# Patient Record
Sex: MALE | Race: WHITE | Employment: FULL TIME | URBAN - METROPOLITAN AREA
[De-identification: names, ages, dates, MRNs, and addresses within clinical notes are randomized per-mention and may not be internally consistent; named-entity substitution may affect disease eponyms.]

---

## 2017-03-16 PROBLEM — Z86.711 HX PULMONARY EMBOLISM: Chronic | Status: ACTIVE | Noted: 2017-03-16

## 2017-03-16 PROBLEM — E66.01 MORBID OBESITY WITH BMI OF 50.0-59.9, ADULT (HCC): Chronic | Status: ACTIVE | Noted: 2017-03-16

## 2017-03-16 PROBLEM — R07.9 CHEST PAIN: Status: ACTIVE | Noted: 2017-03-16

## 2017-03-16 PROBLEM — I10 HTN (HYPERTENSION): Chronic | Status: ACTIVE | Noted: 2017-03-16

## 2017-03-27 ENCOUNTER — OFFICE VISIT (OUTPATIENT)
Dept: CARDIOLOGY CLINIC | Age: 62
End: 2017-03-27

## 2017-03-27 VITALS
DIASTOLIC BLOOD PRESSURE: 74 MMHG | BODY MASS INDEX: 46.65 KG/M2 | HEART RATE: 88 BPM | WEIGHT: 315 LBS | SYSTOLIC BLOOD PRESSURE: 126 MMHG | HEIGHT: 69 IN

## 2017-03-27 DIAGNOSIS — I25.10 CORONARY ARTERY DISEASE INVOLVING NATIVE CORONARY ARTERY OF NATIVE HEART WITHOUT ANGINA PECTORIS: ICD-10-CM

## 2017-03-27 DIAGNOSIS — E66.01 MORBID OBESITY WITH BMI OF 50.0-59.9, ADULT (HCC): ICD-10-CM

## 2017-03-27 DIAGNOSIS — R07.9 CHEST PAIN, UNSPECIFIED TYPE: Primary | ICD-10-CM

## 2017-03-27 DIAGNOSIS — I10 ESSENTIAL HYPERTENSION: ICD-10-CM

## 2017-03-27 PROCEDURE — 99214 OFFICE O/P EST MOD 30 MIN: CPT | Performed by: NURSE PRACTITIONER

## 2017-03-27 RX ORDER — ISOSORBIDE MONONITRATE 30 MG/1
30 TABLET, EXTENDED RELEASE ORAL DAILY
Qty: 30 TABLET | Refills: 5 | Status: SHIPPED | OUTPATIENT
Start: 2017-03-27 | End: 2017-10-12 | Stop reason: SDUPTHER

## 2017-03-27 RX ORDER — ATORVASTATIN CALCIUM 10 MG/1
10 TABLET, FILM COATED ORAL NIGHTLY
Qty: 30 TABLET | Refills: 5 | Status: SHIPPED | OUTPATIENT
Start: 2017-03-27 | End: 2017-11-10 | Stop reason: SDUPTHER

## 2017-06-30 ENCOUNTER — OFFICE VISIT (OUTPATIENT)
Dept: CARDIOLOGY CLINIC | Age: 62
End: 2017-06-30

## 2017-06-30 VITALS
OXYGEN SATURATION: 97 % | HEIGHT: 69 IN | BODY MASS INDEX: 46.65 KG/M2 | WEIGHT: 315 LBS | DIASTOLIC BLOOD PRESSURE: 72 MMHG | SYSTOLIC BLOOD PRESSURE: 128 MMHG | HEART RATE: 82 BPM

## 2017-06-30 DIAGNOSIS — I10 ESSENTIAL HYPERTENSION: Primary | Chronic | ICD-10-CM

## 2017-06-30 PROCEDURE — 99214 OFFICE O/P EST MOD 30 MIN: CPT | Performed by: INTERNAL MEDICINE

## 2017-08-28 ENCOUNTER — OFFICE VISIT (OUTPATIENT)
Dept: CARDIOLOGY CLINIC | Age: 62
End: 2017-08-28

## 2017-08-28 VITALS
WEIGHT: 315 LBS | HEART RATE: 83 BPM | OXYGEN SATURATION: 96 % | SYSTOLIC BLOOD PRESSURE: 114 MMHG | HEIGHT: 69 IN | DIASTOLIC BLOOD PRESSURE: 60 MMHG | BODY MASS INDEX: 46.65 KG/M2

## 2017-08-28 DIAGNOSIS — R06.02 SOB (SHORTNESS OF BREATH): Primary | ICD-10-CM

## 2017-08-28 DIAGNOSIS — I25.10 CORONARY ARTERY DISEASE INVOLVING NATIVE CORONARY ARTERY OF NATIVE HEART WITHOUT ANGINA PECTORIS: ICD-10-CM

## 2017-08-28 DIAGNOSIS — I10 ESSENTIAL HYPERTENSION: ICD-10-CM

## 2017-08-28 LAB
BASOPHILS ABSOLUTE: 0 %
BASOPHILS ABSOLUTE: 0.03 THOU/MCL (ref 0.01–0.07)
EOSINOPHILS ABSOLUTE: 0.22 THOU/MCL (ref 0.03–0.45)
EOSINOPHILS RELATIVE PERCENT: 3 %
HCT VFR BLD CALC: 29 % (ref 40–50)
HEMOGLOBIN: 9.6 G/DL (ref 13.5–16.5)
LYMPHOCYTES ABSOLUTE: 2.05 THOU/MCL (ref 1–4)
LYMPHOCYTES RELATIVE PERCENT: 27 %
MCH RBC QN AUTO: 27 PG (ref 27–33)
MCHC RBC AUTO-ENTMCNC: 33 G/DL (ref 32–36)
MCV RBC AUTO: 81 FL (ref 82–97)
MONOCYTES # BLD: 8 %
MONOCYTES ABSOLUTE: 0.6 THOU/MCL (ref 0.2–0.9)
NEUTROPHILS ABSOLUTE: 4.82 THOU/MCL (ref 1.8–7.7)
PDW BLD-RTO: 16.1 %
PLATELET # BLD: 331 THOU/MCL (ref 140–375)
RBC # BLD: 3.61 MIL/MCL (ref 4.4–5.8)
SEG NEUTROPHILS: 62 %
WBC # BLD: 7.7 THOU/MCL (ref 3.6–10.5)

## 2017-08-28 PROCEDURE — 99214 OFFICE O/P EST MOD 30 MIN: CPT | Performed by: NURSE PRACTITIONER

## 2017-08-28 RX ORDER — AMOXICILLIN 500 MG/1
500 CAPSULE ORAL 2 TIMES DAILY
COMMUNITY
End: 2017-08-28 | Stop reason: CLARIF

## 2017-08-28 RX ORDER — AMOXICILLIN AND CLAVULANATE POTASSIUM 875; 125 MG/1; MG/1
1 TABLET, FILM COATED ORAL 2 TIMES DAILY
COMMUNITY
End: 2019-12-23 | Stop reason: ALTCHOICE

## 2017-08-29 ENCOUNTER — TELEPHONE (OUTPATIENT)
Dept: CARDIOLOGY CLINIC | Age: 62
End: 2017-08-29

## 2017-10-13 RX ORDER — ISOSORBIDE MONONITRATE 30 MG/1
30 TABLET, EXTENDED RELEASE ORAL DAILY
Qty: 30 TABLET | Refills: 11 | Status: SHIPPED | OUTPATIENT
Start: 2017-10-13 | End: 2018-10-16 | Stop reason: SDUPTHER

## 2017-11-10 RX ORDER — ATORVASTATIN CALCIUM 10 MG/1
TABLET, FILM COATED ORAL
Qty: 30 TABLET | Refills: 0 | Status: SHIPPED | OUTPATIENT
Start: 2017-11-10 | End: 2017-12-11 | Stop reason: SDUPTHER

## 2017-12-11 RX ORDER — ATORVASTATIN CALCIUM 10 MG/1
TABLET, FILM COATED ORAL
Qty: 30 TABLET | Refills: 5 | Status: SHIPPED | OUTPATIENT
Start: 2017-12-11 | End: 2018-06-19 | Stop reason: SDUPTHER

## 2018-06-19 RX ORDER — ATORVASTATIN CALCIUM 10 MG/1
TABLET, FILM COATED ORAL
Qty: 30 TABLET | Refills: 0 | Status: SHIPPED | OUTPATIENT
Start: 2018-06-19 | End: 2018-08-16 | Stop reason: SDUPTHER

## 2018-08-17 RX ORDER — ATORVASTATIN CALCIUM 10 MG/1
TABLET, FILM COATED ORAL
Qty: 30 TABLET | Refills: 0 | Status: SHIPPED | OUTPATIENT
Start: 2018-08-17

## 2018-08-19 ENCOUNTER — APPOINTMENT (OUTPATIENT)
Dept: GENERAL RADIOLOGY | Age: 63
End: 2018-08-19
Payer: COMMERCIAL

## 2018-08-19 ENCOUNTER — HOSPITAL ENCOUNTER (EMERGENCY)
Age: 63
Discharge: HOME | End: 2018-08-19
Attending: EMERGENCY MEDICINE
Payer: COMMERCIAL

## 2018-08-19 VITALS
HEIGHT: 69 IN | RESPIRATION RATE: 15 BRPM | DIASTOLIC BLOOD PRESSURE: 75 MMHG | TEMPERATURE: 98 F | OXYGEN SATURATION: 96 % | HEART RATE: 83 BPM | WEIGHT: 315 LBS | SYSTOLIC BLOOD PRESSURE: 130 MMHG | BODY MASS INDEX: 46.65 KG/M2

## 2018-08-19 DIAGNOSIS — I83.013 VENOUS STASIS ULCER OF RIGHT ANKLE, UNSPECIFIED ULCER STAGE, UNSPECIFIED WHETHER VARICOSE VEINS PRESENT (HCC): ICD-10-CM

## 2018-08-19 DIAGNOSIS — I87.8 CHRONIC VENOUS STASIS: Primary | ICD-10-CM

## 2018-08-19 DIAGNOSIS — L97.319 VENOUS STASIS ULCER OF RIGHT ANKLE, UNSPECIFIED ULCER STAGE, UNSPECIFIED WHETHER VARICOSE VEINS PRESENT (HCC): ICD-10-CM

## 2018-08-19 LAB
A/G RATIO: 1.2 (ref 1.1–2.2)
ALBUMIN SERPL-MCNC: 3.7 G/DL (ref 3.4–5)
ALP BLD-CCNC: 126 U/L (ref 40–129)
ALT SERPL-CCNC: 14 U/L (ref 10–40)
ANION GAP SERPL CALCULATED.3IONS-SCNC: 11 MMOL/L (ref 3–16)
AST SERPL-CCNC: 19 U/L (ref 15–37)
BASOPHILS ABSOLUTE: 0.1 K/UL (ref 0–0.2)
BASOPHILS RELATIVE PERCENT: 0.8 %
BILIRUB SERPL-MCNC: 0.4 MG/DL (ref 0–1)
BUN BLDV-MCNC: 19 MG/DL (ref 7–20)
CALCIUM SERPL-MCNC: 8.4 MG/DL (ref 8.3–10.6)
CHLORIDE BLD-SCNC: 105 MMOL/L (ref 99–110)
CO2: 25 MMOL/L (ref 21–32)
CREAT SERPL-MCNC: 0.9 MG/DL (ref 0.8–1.3)
EOSINOPHILS ABSOLUTE: 0.3 K/UL (ref 0–0.6)
EOSINOPHILS RELATIVE PERCENT: 3.2 %
GFR AFRICAN AMERICAN: >60
GFR NON-AFRICAN AMERICAN: >60
GLOBULIN: 3 G/DL
GLUCOSE BLD-MCNC: 84 MG/DL (ref 70–99)
HCT VFR BLD CALC: 32.4 % (ref 40.5–52.5)
HEMOGLOBIN: 10.7 G/DL (ref 13.5–17.5)
INR BLD: 2.08 (ref 0.86–1.14)
LYMPHOCYTES ABSOLUTE: 1.7 K/UL (ref 1–5.1)
LYMPHOCYTES RELATIVE PERCENT: 18.9 %
MCH RBC QN AUTO: 24.1 PG (ref 26–34)
MCHC RBC AUTO-ENTMCNC: 33 G/DL (ref 31–36)
MCV RBC AUTO: 72.8 FL (ref 80–100)
MONOCYTES ABSOLUTE: 0.8 K/UL (ref 0–1.3)
MONOCYTES RELATIVE PERCENT: 9.1 %
NEUTROPHILS ABSOLUTE: 5.9 K/UL (ref 1.7–7.7)
NEUTROPHILS RELATIVE PERCENT: 68 %
PDW BLD-RTO: 17.8 % (ref 12.4–15.4)
PLATELET # BLD: 220 K/UL (ref 135–450)
PMV BLD AUTO: 7 FL (ref 5–10.5)
POTASSIUM SERPL-SCNC: 4.5 MMOL/L (ref 3.5–5.1)
PRO-BNP: 126 PG/ML (ref 0–124)
PROTHROMBIN TIME: 23.7 SEC (ref 9.8–13)
RBC # BLD: 4.45 M/UL (ref 4.2–5.9)
SODIUM BLD-SCNC: 141 MMOL/L (ref 136–145)
TOTAL PROTEIN: 6.7 G/DL (ref 6.4–8.2)
TROPONIN: <0.01 NG/ML
WBC # BLD: 8.7 K/UL (ref 4–11)

## 2018-08-19 PROCEDURE — 87070 CULTURE OTHR SPECIMN AEROBIC: CPT

## 2018-08-19 PROCEDURE — 87077 CULTURE AEROBIC IDENTIFY: CPT

## 2018-08-19 PROCEDURE — 87186 SC STD MICRODIL/AGAR DIL: CPT

## 2018-08-19 PROCEDURE — 83880 ASSAY OF NATRIURETIC PEPTIDE: CPT

## 2018-08-19 PROCEDURE — 87205 SMEAR GRAM STAIN: CPT

## 2018-08-19 PROCEDURE — 85610 PROTHROMBIN TIME: CPT

## 2018-08-19 PROCEDURE — 84484 ASSAY OF TROPONIN QUANT: CPT

## 2018-08-19 PROCEDURE — 99284 EMERGENCY DEPT VISIT MOD MDM: CPT

## 2018-08-19 PROCEDURE — 80053 COMPREHEN METABOLIC PANEL: CPT

## 2018-08-19 PROCEDURE — 71045 X-RAY EXAM CHEST 1 VIEW: CPT

## 2018-08-19 PROCEDURE — 85025 COMPLETE CBC W/AUTO DIFF WBC: CPT

## 2018-08-19 RX ORDER — ALPRAZOLAM 0.5 MG/1
0.5 TABLET ORAL
COMMUNITY

## 2018-08-23 LAB
GRAM STAIN RESULT: ABNORMAL
ORGANISM: ABNORMAL
WOUND/ABSCESS: ABNORMAL
WOUND/ABSCESS: ABNORMAL

## 2018-10-17 RX ORDER — ISOSORBIDE MONONITRATE 30 MG/1
TABLET, EXTENDED RELEASE ORAL
Qty: 30 TABLET | Refills: 0 | Status: SHIPPED | OUTPATIENT
Start: 2018-10-17 | End: 2018-11-27 | Stop reason: SDUPTHER

## 2018-11-27 RX ORDER — ISOSORBIDE MONONITRATE 30 MG/1
TABLET, EXTENDED RELEASE ORAL
Qty: 10 TABLET | Refills: 0 | Status: SHIPPED | OUTPATIENT
Start: 2018-11-27 | End: 2018-12-07 | Stop reason: SDUPTHER

## 2018-12-07 RX ORDER — ISOSORBIDE MONONITRATE 30 MG/1
TABLET, EXTENDED RELEASE ORAL
Qty: 10 TABLET | Refills: 0 | Status: SHIPPED | OUTPATIENT
Start: 2018-12-07 | End: 2018-12-27 | Stop reason: SDUPTHER

## 2019-01-03 ENCOUNTER — OFFICE VISIT (OUTPATIENT)
Dept: CARDIOLOGY CLINIC | Age: 64
End: 2019-01-03
Payer: COMMERCIAL

## 2019-01-03 VITALS
HEART RATE: 77 BPM | OXYGEN SATURATION: 98 % | DIASTOLIC BLOOD PRESSURE: 84 MMHG | HEIGHT: 70 IN | SYSTOLIC BLOOD PRESSURE: 138 MMHG | BODY MASS INDEX: 45.1 KG/M2 | WEIGHT: 315 LBS

## 2019-01-03 DIAGNOSIS — I10 ESSENTIAL HYPERTENSION: Chronic | ICD-10-CM

## 2019-01-03 DIAGNOSIS — E66.01 MORBID OBESITY WITH BMI OF 50.0-59.9, ADULT (HCC): Primary | Chronic | ICD-10-CM

## 2019-01-03 DIAGNOSIS — I25.10 CORONARY ARTERY DISEASE INVOLVING NATIVE CORONARY ARTERY OF NATIVE HEART WITHOUT ANGINA PECTORIS: ICD-10-CM

## 2019-01-03 PROCEDURE — 99214 OFFICE O/P EST MOD 30 MIN: CPT | Performed by: INTERNAL MEDICINE

## 2019-01-14 RX ORDER — ISOSORBIDE MONONITRATE 30 MG/1
30 TABLET, EXTENDED RELEASE ORAL DAILY
Qty: 90 TABLET | Refills: 3 | Status: SHIPPED | OUTPATIENT
Start: 2019-01-14 | End: 2019-12-06 | Stop reason: SDUPTHER

## 2019-12-06 RX ORDER — ISOSORBIDE MONONITRATE 30 MG/1
30 TABLET, EXTENDED RELEASE ORAL DAILY
Qty: 90 TABLET | Refills: 0 | Status: SHIPPED | OUTPATIENT
Start: 2019-12-06 | End: 2020-03-13 | Stop reason: SDUPTHER

## 2019-12-23 ENCOUNTER — HOSPITAL ENCOUNTER (EMERGENCY)
Age: 64
Discharge: HOME | End: 2019-12-23
Attending: EMERGENCY MEDICINE
Payer: COMMERCIAL

## 2019-12-23 VITALS
WEIGHT: 315 LBS | SYSTOLIC BLOOD PRESSURE: 159 MMHG | HEIGHT: 69 IN | HEART RATE: 75 BPM | DIASTOLIC BLOOD PRESSURE: 94 MMHG | RESPIRATION RATE: 20 BRPM | OXYGEN SATURATION: 97 % | TEMPERATURE: 97.4 F | BODY MASS INDEX: 46.65 KG/M2

## 2019-12-23 DIAGNOSIS — R04.0 EPISTAXIS: Primary | ICD-10-CM

## 2019-12-23 LAB
BASOPHILS ABSOLUTE: 0 K/UL (ref 0–0.2)
BASOPHILS RELATIVE PERCENT: 0.6 %
EOSINOPHILS ABSOLUTE: 0.2 K/UL (ref 0–0.6)
EOSINOPHILS RELATIVE PERCENT: 3.7 %
HCT VFR BLD CALC: 41.9 % (ref 40.5–52.5)
HEMOGLOBIN: 14.1 G/DL (ref 13.5–17.5)
INR BLD: 2.27 (ref 0.86–1.14)
LYMPHOCYTES ABSOLUTE: 1.2 K/UL (ref 1–5.1)
LYMPHOCYTES RELATIVE PERCENT: 18.1 %
MCH RBC QN AUTO: 29.8 PG (ref 26–34)
MCHC RBC AUTO-ENTMCNC: 33.7 G/DL (ref 31–36)
MCV RBC AUTO: 88.3 FL (ref 80–100)
MONOCYTES ABSOLUTE: 0.6 K/UL (ref 0–1.3)
MONOCYTES RELATIVE PERCENT: 8.5 %
NEUTROPHILS ABSOLUTE: 4.6 K/UL (ref 1.7–7.7)
NEUTROPHILS RELATIVE PERCENT: 69.1 %
PDW BLD-RTO: 13.5 % (ref 12.4–15.4)
PLATELET # BLD: 180 K/UL (ref 135–450)
PMV BLD AUTO: 6.6 FL (ref 5–10.5)
PROTHROMBIN TIME: 26.6 SEC (ref 10–13.2)
RBC # BLD: 4.75 M/UL (ref 4.2–5.9)
WBC # BLD: 6.7 K/UL (ref 4–11)

## 2019-12-23 PROCEDURE — 99283 EMERGENCY DEPT VISIT LOW MDM: CPT

## 2019-12-23 PROCEDURE — 30901 CONTROL OF NOSEBLEED: CPT

## 2019-12-23 PROCEDURE — 6370000000 HC RX 637 (ALT 250 FOR IP)

## 2019-12-23 PROCEDURE — 85025 COMPLETE CBC W/AUTO DIFF WBC: CPT

## 2019-12-23 PROCEDURE — 85610 PROTHROMBIN TIME: CPT

## 2019-12-23 RX ORDER — OXYMETAZOLINE HYDROCHLORIDE 0.05 G/100ML
SPRAY NASAL
Status: COMPLETED
Start: 2019-12-23 | End: 2019-12-23

## 2019-12-23 RX ADMIN — OXYMETAZOLINE HCL: 0.05 SPRAY NASAL at 07:20

## 2019-12-23 ASSESSMENT — ENCOUNTER SYMPTOMS
BACK PAIN: 0
SHORTNESS OF BREATH: 0
SORE THROAT: 0
COUGH: 0
CONSTIPATION: 0
NAUSEA: 0
DIARRHEA: 0
VOMITING: 0
ABDOMINAL PAIN: 0

## 2020-03-06 ENCOUNTER — TELEPHONE (OUTPATIENT)
Dept: CARDIOLOGY CLINIC | Age: 65
End: 2020-03-06

## 2020-03-13 RX ORDER — ISOSORBIDE MONONITRATE 30 MG/1
30 TABLET, EXTENDED RELEASE ORAL DAILY
Qty: 90 TABLET | Refills: 0 | Status: SHIPPED | OUTPATIENT
Start: 2020-03-13 | End: 2020-06-01

## 2020-06-01 RX ORDER — ISOSORBIDE MONONITRATE 30 MG/1
TABLET, EXTENDED RELEASE ORAL
Qty: 90 TABLET | Refills: 1 | Status: SHIPPED | OUTPATIENT
Start: 2020-06-01 | End: 2020-11-30 | Stop reason: SDUPTHER

## 2020-06-15 ENCOUNTER — HOSPITAL ENCOUNTER (EMERGENCY)
Age: 65
Discharge: HOME | End: 2020-06-16
Attending: EMERGENCY MEDICINE
Payer: COMMERCIAL

## 2020-06-15 ENCOUNTER — APPOINTMENT (OUTPATIENT)
Dept: GENERAL RADIOLOGY | Age: 65
End: 2020-06-15
Payer: COMMERCIAL

## 2020-06-15 LAB
A/G RATIO: 1 (ref 1.1–2.2)
ALBUMIN SERPL-MCNC: 3.8 G/DL (ref 3.4–5)
ALP BLD-CCNC: 105 U/L (ref 40–129)
ALT SERPL-CCNC: 19 U/L (ref 10–40)
ANION GAP SERPL CALCULATED.3IONS-SCNC: 7 MMOL/L (ref 3–16)
AST SERPL-CCNC: 19 U/L (ref 15–37)
BASOPHILS ABSOLUTE: 0 K/UL (ref 0–0.2)
BASOPHILS RELATIVE PERCENT: 0.2 %
BILIRUB SERPL-MCNC: 1.1 MG/DL (ref 0–1)
BUN BLDV-MCNC: 14 MG/DL (ref 7–20)
CALCIUM SERPL-MCNC: 9.1 MG/DL (ref 8.3–10.6)
CHLORIDE BLD-SCNC: 97 MMOL/L (ref 99–110)
CO2: 29 MMOL/L (ref 21–32)
CREAT SERPL-MCNC: 0.9 MG/DL (ref 0.8–1.3)
EOSINOPHILS ABSOLUTE: 0.1 K/UL (ref 0–0.6)
EOSINOPHILS RELATIVE PERCENT: 0.6 %
GFR AFRICAN AMERICAN: >60
GFR NON-AFRICAN AMERICAN: >60
GLOBULIN: 3.8 G/DL
GLUCOSE BLD-MCNC: 119 MG/DL (ref 70–99)
HCT VFR BLD CALC: 41 % (ref 40.5–52.5)
HEMOGLOBIN: 13.6 G/DL (ref 13.5–17.5)
LACTIC ACID: 1 MMOL/L (ref 0.4–2)
LYMPHOCYTES ABSOLUTE: 1 K/UL (ref 1–5.1)
LYMPHOCYTES RELATIVE PERCENT: 5.5 %
MCH RBC QN AUTO: 27.6 PG (ref 26–34)
MCHC RBC AUTO-ENTMCNC: 33.2 G/DL (ref 31–36)
MCV RBC AUTO: 83.2 FL (ref 80–100)
MONOCYTES ABSOLUTE: 1 K/UL (ref 0–1.3)
MONOCYTES RELATIVE PERCENT: 5.7 %
NEUTROPHILS ABSOLUTE: 15.2 K/UL (ref 1.7–7.7)
NEUTROPHILS RELATIVE PERCENT: 88 %
PDW BLD-RTO: 13.8 % (ref 12.4–15.4)
PLATELET # BLD: 238 K/UL (ref 135–450)
PMV BLD AUTO: 6.8 FL (ref 5–10.5)
POTASSIUM SERPL-SCNC: 4.8 MMOL/L (ref 3.5–5.1)
PROCALCITONIN: 0.16 NG/ML (ref 0–0.15)
RBC # BLD: 4.93 M/UL (ref 4.2–5.9)
SODIUM BLD-SCNC: 133 MMOL/L (ref 136–145)
TOTAL PROTEIN: 7.6 G/DL (ref 6.4–8.2)
WBC # BLD: 17.3 K/UL (ref 4–11)

## 2020-06-15 PROCEDURE — 85025 COMPLETE CBC W/AUTO DIFF WBC: CPT

## 2020-06-15 PROCEDURE — 84145 PROCALCITONIN (PCT): CPT

## 2020-06-15 PROCEDURE — 80053 COMPREHEN METABOLIC PANEL: CPT

## 2020-06-15 PROCEDURE — 2580000003 HC RX 258: Performed by: EMERGENCY MEDICINE

## 2020-06-15 PROCEDURE — 83605 ASSAY OF LACTIC ACID: CPT

## 2020-06-15 PROCEDURE — 99283 EMERGENCY DEPT VISIT LOW MDM: CPT

## 2020-06-15 PROCEDURE — 71045 X-RAY EXAM CHEST 1 VIEW: CPT

## 2020-06-15 RX ORDER — 0.9 % SODIUM CHLORIDE 0.9 %
500 INTRAVENOUS SOLUTION INTRAVENOUS ONCE
Status: COMPLETED | OUTPATIENT
Start: 2020-06-15 | End: 2020-06-16

## 2020-06-15 RX ADMIN — SODIUM CHLORIDE 500 ML: 9 INJECTION, SOLUTION INTRAVENOUS at 23:04

## 2020-06-16 VITALS
WEIGHT: 315 LBS | SYSTOLIC BLOOD PRESSURE: 164 MMHG | DIASTOLIC BLOOD PRESSURE: 73 MMHG | OXYGEN SATURATION: 96 % | TEMPERATURE: 99.8 F | RESPIRATION RATE: 20 BRPM | BODY MASS INDEX: 46.65 KG/M2 | HEART RATE: 108 BPM | HEIGHT: 69 IN

## 2020-06-16 LAB
BACTERIA: ABNORMAL /HPF
BILIRUBIN URINE: NEGATIVE
BLOOD, URINE: ABNORMAL
CLARITY: CLEAR
COLOR: YELLOW
EPITHELIAL CELLS, UA: ABNORMAL /HPF (ref 0–5)
GLUCOSE URINE: NEGATIVE MG/DL
KETONES, URINE: NEGATIVE MG/DL
LEUKOCYTE ESTERASE, URINE: NEGATIVE
MICROSCOPIC EXAMINATION: YES
MUCUS: ABNORMAL /LPF
NITRITE, URINE: NEGATIVE
PH UA: 7.5 (ref 5–8)
PROTEIN UA: NEGATIVE MG/DL
RBC UA: ABNORMAL /HPF (ref 0–4)
SPECIFIC GRAVITY UA: 1.01 (ref 1–1.03)
URINE TYPE: ABNORMAL
UROBILINOGEN, URINE: 4 E.U./DL
WBC UA: ABNORMAL /HPF (ref 0–5)

## 2020-06-16 PROCEDURE — 2580000003 HC RX 258: Performed by: EMERGENCY MEDICINE

## 2020-06-16 PROCEDURE — 6370000000 HC RX 637 (ALT 250 FOR IP): Performed by: EMERGENCY MEDICINE

## 2020-06-16 PROCEDURE — 81001 URINALYSIS AUTO W/SCOPE: CPT

## 2020-06-16 RX ORDER — PREDNISONE 10 MG/1
20 TABLET ORAL DAILY
Qty: 6 TABLET | Refills: 0 | Status: SHIPPED | OUTPATIENT
Start: 2020-06-16 | End: 2020-06-19

## 2020-06-16 RX ORDER — DOXYCYCLINE HYCLATE 100 MG
100 TABLET ORAL 2 TIMES DAILY
Qty: 20 TABLET | Refills: 0 | Status: SHIPPED | OUTPATIENT
Start: 2020-06-16 | End: 2020-06-26

## 2020-06-16 RX ORDER — DOXYCYCLINE HYCLATE 100 MG
100 TABLET ORAL ONCE
Status: COMPLETED | OUTPATIENT
Start: 2020-06-16 | End: 2020-06-16

## 2020-06-16 RX ORDER — ALBUTEROL SULFATE 90 UG/1
2 AEROSOL, METERED RESPIRATORY (INHALATION) EVERY 4 HOURS PRN
Qty: 1 INHALER | Refills: 0 | Status: SHIPPED | OUTPATIENT
Start: 2020-06-16 | End: 2021-06-16

## 2020-06-16 RX ORDER — PREDNISONE 20 MG/1
40 TABLET ORAL ONCE
Status: COMPLETED | OUTPATIENT
Start: 2020-06-16 | End: 2020-06-16

## 2020-06-16 RX ORDER — 0.9 % SODIUM CHLORIDE 0.9 %
500 INTRAVENOUS SOLUTION INTRAVENOUS ONCE
Status: COMPLETED | OUTPATIENT
Start: 2020-06-16 | End: 2020-06-16

## 2020-06-16 RX ORDER — IBUPROFEN 800 MG/1
800 TABLET ORAL ONCE
Status: COMPLETED | OUTPATIENT
Start: 2020-06-16 | End: 2020-06-16

## 2020-06-16 RX ADMIN — SODIUM CHLORIDE 500 ML: 9 INJECTION, SOLUTION INTRAVENOUS at 00:34

## 2020-06-16 RX ADMIN — PREDNISONE 40 MG: 20 TABLET ORAL at 01:05

## 2020-06-16 RX ADMIN — IBUPROFEN 800 MG: 800 TABLET ORAL at 01:05

## 2020-06-16 RX ADMIN — DOXYCYCLINE HYCLATE 100 MG: 100 TABLET, COATED ORAL at 01:05

## 2020-06-16 ASSESSMENT — PAIN SCALES - WONG BAKER: WONGBAKER_NUMERICALRESPONSE: 2

## 2020-06-16 ASSESSMENT — PAIN SCALES - GENERAL: PAINLEVEL_OUTOF10: 3

## 2020-06-16 NOTE — ED PROVIDER NOTES
CHIEF COMPLAINT  Fever (on and off for around a week, states was 103 at home prior to arrival - 99.9 during triage, has hx UTI's)      HISTORY OF PRESENT ILLNESS  Gee Linares is a 75 y.o. male who presents to the ED with *fever on and off for about a week subjectively states his fever got up to 103 at home today he did take Tylenol. Spoke with his doctor who told him to come to the ER. He does have a history of frequent UTIs that he says is been urinating about every 20 minutes. No cough or shortness of breath or any night sweats or any dizziness or headache or any nausea or vomiting or abdominal pain or chest pain. He denies any numbness or tingling. No bowel issues. No other complaints, modifying factors or associated symptoms. I have reviewed the following from the nursing documentation. Past Medical History:   Diagnosis Date    H/O gastric bypass 07/2014    Hypertension     Obesity     Panic attacks     PE (pulmonary embolism) 2010     Past Surgical History:   Procedure Laterality Date    CHOLECYSTECTOMY  2009    GASTRIC BYPASS SURGERY  2014    HERNIA REPAIR  03/91/8452    umbilical hernia     History reviewed. No pertinent family history.   Social History     Socioeconomic History    Marital status:      Spouse name: Not on file    Number of children: Not on file    Years of education: Not on file    Highest education level: Not on file   Occupational History    Not on file   Social Needs    Financial resource strain: Not on file    Food insecurity     Worry: Not on file     Inability: Not on file    Transportation needs     Medical: Not on file     Non-medical: Not on file   Tobacco Use    Smoking status: Never Smoker    Smokeless tobacco: Never Used   Substance and Sexual Activity    Alcohol use: Yes     Comment: occ    Drug use: No    Sexual activity: Yes     Partners: Female   Lifestyle    Physical activity     Days per week: Not on file     Minutes per session: Not on file    Stress: Not on file   Relationships    Social connections     Talks on phone: Not on file     Gets together: Not on file     Attends Rastafarian service: Not on file     Active member of club or organization: Not on file     Attends meetings of clubs or organizations: Not on file     Relationship status: Not on file    Intimate partner violence     Fear of current or ex partner: Not on file     Emotionally abused: Not on file     Physically abused: Not on file     Forced sexual activity: Not on file   Other Topics Concern    Not on file   Social History Narrative    Not on file     No current facility-administered medications for this encounter. Current Outpatient Medications   Medication Sig Dispense Refill    doxycycline hyclate (VIBRA-TABS) 100 MG tablet Take 1 tablet by mouth 2 times daily for 10 days 20 tablet 0    predniSONE (DELTASONE) 10 MG tablet Take 2 tablets by mouth daily for 3 days 6 tablet 0    albuterol sulfate HFA (PROVENTIL HFA) 108 (90 Base) MCG/ACT inhaler Inhale 2 puffs into the lungs every 4 hours as needed for Wheezing 1 Inhaler 0    isosorbide mononitrate (IMDUR) 30 MG extended release tablet TAKE 1 TABLET BY MOUTH EVERY DAY 90 tablet 1    ALPRAZolam (XANAX) 0.5 MG tablet Take 0.5 mg by mouth. Allena Charlene atorvastatin (LIPITOR) 10 MG tablet TAKE 1 TABLET BY MOUTH EVERY EVENING 30 tablet 0    Multiple Vitamins-Minerals (CENTRUM SILVER PO) Take 1 tablet by mouth daily      Calcium Citrate (CITRACAL PO) Take 1 tablet by mouth daily      nitroGLYCERIN (NITROSTAT) 0.4 MG SL tablet up to max of 3 total doses. If no relief after 1 dose, call 911. 25 tablet 0    warfarin (COUMADIN) 5 MG tablet 10 mg on Sun Tuesday Thursday and Saturday. 7.5 mg Monday Wednesday and Friday (Patient taking differently: Take 10 mg by mouth daily ) 30 tablet 3    aspirin 81 MG tablet Take 81 mg by mouth daily.        No Known Allergies    REVIEW OF SYSTEMS  10 systems reviewed, Sodium 133 (L) 136 - 145 mmol/L    Potassium 4.8 3.5 - 5.1 mmol/L    Chloride 97 (L) 99 - 110 mmol/L    CO2 29 21 - 32 mmol/L    Anion Gap 7 3 - 16    Glucose 119 (H) 70 - 99 mg/dL    BUN 14 7 - 20 mg/dL    CREATININE 0.9 0.8 - 1.3 mg/dL    GFR Non-African American >60 >60    GFR African American >60 >60    Calcium 9.1 8.3 - 10.6 mg/dL    Total Protein 7.6 6.4 - 8.2 g/dL    Alb 3.8 3.4 - 5.0 g/dL    Albumin/Globulin Ratio 1.0 (L) 1.1 - 2.2    Total Bilirubin 1.1 (H) 0.0 - 1.0 mg/dL    Alkaline Phosphatase 105 40 - 129 U/L    ALT 19 10 - 40 U/L    AST 19 15 - 37 U/L    Globulin 3.8 g/dL   Urinalysis   Result Value Ref Range    Color, UA Yellow Straw/Yellow    Clarity, UA Clear Clear    Glucose, Ur Negative Negative mg/dL    Bilirubin Urine Negative Negative    Ketones, Urine Negative Negative mg/dL    Specific Gravity, UA 1.015 1.005 - 1.030    Blood, Urine SMALL (A) Negative    pH, UA 7.5 5.0 - 8.0    Protein, UA Negative Negative mg/dL    Urobilinogen, Urine 4.0 (A) <2.0 E.U./dL    Nitrite, Urine Negative Negative    Leukocyte Esterase, Urine Negative Negative    Microscopic Examination YES     Urine Type NotGiven    Lactic Acid, Plasma   Result Value Ref Range    Lactic Acid 1.0 0.4 - 2.0 mmol/L   Procalcitonin   Result Value Ref Range    Procalcitonin 0.16 (H) 0.00 - 0.15 ng/mL   Microscopic Urinalysis   Result Value Ref Range    Mucus, UA Rare (A) None Seen /LPF    WBC, UA 3-5 0 - 5 /HPF    RBC, UA 11-20 (A) 0 - 4 /HPF    Epithelial Cells, UA 0-1 0 - 5 /HPF    Bacteria, UA Rare (A) None Seen /HPF       RADIOLOGY  X-RAYS:  I have reviewed radiologic plain film image(s). ALL OTHER NON-PLAIN FILM IMAGES SUCH AS CT, ULTRASOUND AND MRI HAVE BEEN READ BY THE RADIOLOGIST. XR CHEST PORTABLE   Final Result   Question right basilar airspace disease, atelectasis and/or pneumonia. ED COURSE/MDM  Patient seen and evaluated. I estimate there is LOW risk for (including but not limited to) ACUTE CORONARY DO  06/16/20 7121

## 2020-06-17 ENCOUNTER — TELEPHONE (OUTPATIENT)
Dept: OTHER | Facility: CLINIC | Age: 65
End: 2020-06-17

## 2020-06-17 NOTE — TELEPHONE ENCOUNTER
Patient contacted regarding recent visit for viral symptoms. This Lor Woods contacted the family by telephone to perform post discharge call. Verified name and  with family as identifiers. Provided introduction to self, and reason for call due to viral symptoms of infection and/or exposure to COVID-19. Patient presented to emergency department/flu clinic with complaints of viral symptoms/exposure to COVID. Patient reports symptoms are the same. Due to no new or worsening symptoms the RN CTN/ACJOHNNIE was not notified for escalation. Discussed exposure protocols and quarantine with CDC Guidelines What To Do If You Are Sick    Family was given an opportunity for questions and concerns. Stay home except to get medical care    Separate yourself from other people and animals in your home    Call ahead before visiting your doctor    Wear a facemask    Cover your coughs and sneezes    Clean your hands often    Avoid sharing personal household items    Clean all high-touch surfaces everyday    Monitor your symptoms  Seek prompt medical attention if your illness is worsening (e.g., difficulty breathing). Before seeking care, call your healthcare provider and tell them that you have, or are being evaluated for, COVID-19. Put on a facemask before you enter the facility. These steps will help the healthcare provider's office to keep other people in the office or waiting room from getting infected or exposed. Ask your healthcare provider to call the local or [facility] health department. Persons who are placed under If you have a medical emergency and need to call 911, notify the dispatch personnel that you have, or are being evaluated for COVID-19. If possible, put on a facemask before emergency medical services arrive. The family agrees to contact the [facility] exposure line [phone], local health department  and PCP office for questions related to their healthcare.  Author provided contact information for future reference.     Patient/family/caregiver given information for [employer] and agrees to enroll no  Spoke with Ernesto's wife Ritika Kaila

## 2020-09-18 ENCOUNTER — HOSPITAL ENCOUNTER (OUTPATIENT)
Age: 65
Discharge: HOME | End: 2020-09-18
Payer: COMMERCIAL

## 2020-09-18 ENCOUNTER — OFFICE VISIT (OUTPATIENT)
Dept: CARDIOLOGY CLINIC | Age: 65
End: 2020-09-18
Payer: COMMERCIAL

## 2020-09-18 VITALS
HEIGHT: 69 IN | BODY MASS INDEX: 46.65 KG/M2 | WEIGHT: 315 LBS | DIASTOLIC BLOOD PRESSURE: 82 MMHG | OXYGEN SATURATION: 97 % | HEART RATE: 80 BPM | SYSTOLIC BLOOD PRESSURE: 150 MMHG

## 2020-09-18 LAB — PRO-BNP: 177 PG/ML (ref 0–124)

## 2020-09-18 PROCEDURE — 83880 ASSAY OF NATRIURETIC PEPTIDE: CPT

## 2020-09-18 PROCEDURE — 99214 OFFICE O/P EST MOD 30 MIN: CPT | Performed by: INTERNAL MEDICINE

## 2020-09-18 PROCEDURE — 36415 COLL VENOUS BLD VENIPUNCTURE: CPT

## 2020-09-18 NOTE — PROGRESS NOTES
[address] [facility]   Cardiovascular Evaluation    PATIENT: Kory Mosqueda  DATE: 2020  MRN: [id number]  CSN: [id number]  : 1955    Primary Care Doctor: Marybeth Patrick MD    Reason for evaluation:   1 Year Follow Up; Coronary Artery Disease; and Shortness of Breath      Subjective:    History of present illness on initial date of evaluation:   Kory Mosqueda is a 75 y.o. patient who presents for follow up. Today he reports that he has been experiencing exertional shortness of breath for the last 3-4 weeks. His breathing becomes labored on minimal exertion. This generally resolves after a few minutes of rest. He is concerned about congestive heart failure. Arpit Renteria has been taking his medications as prescribed without side effects. He admits to gaining a significant amount of weight due to lack of dietary control and lack of exercise. He has attempted to walk regularly, though becomes easily short of breath. Patient Active Problem List   Diagnosis    Chest pain    HTN (hypertension)    Morbid obesity with BMI of 50.0-59.9, adult ([facility] [address]    Hx pulmonary embolism    Shortness of breath    Chest discomfort    Diaphoresis    Abnormal stress echocardiogram    Coronary artery disease involving native coronary artery of native heart without angina pectoris         Cardiac Testing: I have reviewed the findings below. EKG:  ECHO:   STRESS TEST:  CATH:  BYPASS:  VASCULAR:    Past Medical History:   has a past medical history of H/O gastric bypass, Hypertension, Obesity, Panic attacks, and PE (pulmonary embolism). Surgical History:   has a past surgical history that includes Cholecystectomy (); Gastric bypass surgery (); and hernia repair (2017). Social History:   reports that he has never smoked. He has never used smokeless tobacco. He reports current alcohol use. He reports that he does not use drugs.      Family History:  No evidence for sudden cardiac death or premature CAD    Medications:  Reviewed and are listed in nursing record. and/or listed below  Outpatient Medications:  Prior to Admission medications    Medication Sig Start Date End Date Taking? Authorizing Provider   LISINOPRIL PO Take by mouth   Yes Historical Provider, MD   isosorbide mononitrate (IMDUR) 30 MG extended release tablet TAKE 1 TABLET BY MOUTH EVERY DAY 6/1/20  Yes Crys Quigley MD   atorvastatin (LIPITOR) 10 MG tablet TAKE 1 TABLET BY MOUTH EVERY EVENING 8/17/18  Yes Jasbir Aden MD   Multiple Vitamins-Minerals (CENTRUM SILVER PO) Take 1 tablet by mouth daily   Yes Historical Provider, MD   Calcium Citrate (CITRACAL PO) Take 1 tablet by mouth daily   Yes Historical Provider, MD   warfarin (COUMADIN) 5 MG tablet 10 mg on Sun Tuesday Thursday and Saturday. 7.5 mg Monday Wednesday and Friday  Patient taking differently: Take 10 mg by mouth daily  3/17/17  Yes Og Benjamin MD   aspirin 81 MG tablet Take 81 mg by mouth daily. Yes Historical Provider, MD   albuterol sulfate HFA (PROVENTIL HFA) 108 (90 Base) MCG/ACT inhaler Inhale 2 puffs into the lungs every 4 hours as needed for Wheezing  Patient not taking: Reported on 9/18/2020 6/16/20 6/16/21  Lavern Zaidi,    ALPRAZolam Vedalila Amin) 0.5 MG tablet Take 0.5 mg by mouth. .    Historical Provider, MD   nitroGLYCERIN (NITROSTAT) 0.4 MG SL tablet up to max of 3 total doses. If no relief after 1 dose, call 911. Patient not taking: Reported on 9/18/2020 3/17/17   Og Benjamin MD       In-patient schedule medications:    Infusion Medications: Allergies:  Patient has no known allergies. Review of Systems:   All 12 point review of symptoms completed. Pertinent positives identified in the HPI, all other review of symptoms negative as below.     Physical Examination:    Vitals:    09/18/20 1434   BP: (!) 150/82   Pulse:    SpO2:     Weight: (!) 420 lb 8 oz (190.7 kg)     Wt Readings from Last 3 Encounters:   09/18/20 (!) 420 lb 8 giving the patient detailed instructions instructions on addressing diet, regular exercise, weight control, smoking abstention, medication compliance, and stress minimization. The patient was provided written and verbal instructions regarding risk factor modification. 4. Discussed referral to weight loss team.   5. Follow up with me after testing. This note was scribed in the presence of Dr. Edson Padron MD by Destiny Colón RN.      I, Dr. Edson Padron, personally performed the services described in this documentation, as scribed by the above signed scribe in my presence. It is both accurate and complete to my knowledge. I agree with the details independently gathered by the clinical support staff, while the remaining scribed note accurately describes my personal service to the patient. All questions and concerns were addressed to the patient/family. Alternatives to my treatment were discussed. The note was completed using EMR. Every effort was made to ensure accuracy; however, inadvertent computerized transcription errors may be present.     Edson Padron MD, [address], [place], [place]  [phone] [place] office  [phone] [facility]  9/18/2020  2:42 PM

## 2020-09-18 NOTE — PATIENT INSTRUCTIONS
Plan:  1. Labs- BNP  2. An echocardiogram will be ordered. This will allow review of the patient's left ventricular function and determine any valve abnormalities. The pericardium and other structures will also be evaluated. 3. The patient was seen for >25 minutes. >50% of the time was devoted to giving the patient detailed instructions instructions on addressing diet, regular exercise, weight control, smoking abstention, medication compliance, and stress minimization. The patient was provided written and verbal instructions regarding risk factor modification. 4. Discussed referral to weight loss team.   5. Follow up with me after testing. Your provider has ordered testing for further evaluation. An order/prescription has been included in your paper work.  To schedule outpatient testing, contact Central Scheduling by calling [employer] ([phone]).

## 2020-09-21 ENCOUNTER — TELEPHONE (OUTPATIENT)
Dept: CARDIOLOGY CLINIC | Age: 65
End: 2020-09-21

## 2020-09-21 RX ORDER — FUROSEMIDE 40 MG/1
TABLET ORAL
Qty: 10 TABLET | Refills: 0 | Status: SHIPPED | OUTPATIENT
Start: 2020-09-21 | End: 2020-11-11

## 2020-09-21 NOTE — TELEPHONE ENCOUNTER
----- Message from Soren Armando MD sent at 9/21/2020 10:38 AM EDT -----  The numbers are very mildly elevated and do not favor that the patient has significant volume overload as a contributing factor to his symptoms. Would suggest that the patient's symptomatology is likely related to his excess weight. We can trial a low-dose Lasix 40 mg daily. Lets give the patient a prescription for  10 days of this medication to see any response.

## 2020-09-22 ENCOUNTER — HOSPITAL ENCOUNTER (OUTPATIENT)
Dept: CARDIOLOGY | Age: 65
Discharge: HOME | End: 2020-09-22
Payer: COMMERCIAL

## 2020-09-22 LAB
LV EF: 55 %
LVEF MODALITY: NORMAL

## 2020-09-22 PROCEDURE — C8929 TTE W OR WO FOL WCON,DOPPLER: HCPCS

## 2020-09-22 PROCEDURE — 6360000004 HC RX CONTRAST MEDICATION: Performed by: INTERNAL MEDICINE

## 2020-09-22 RX ADMIN — PERFLUTREN 2.2 MG: 6.52 INJECTION, SUSPENSION INTRAVENOUS at 16:01

## 2020-09-23 NOTE — TELEPHONE ENCOUNTER
Spoke with Carmela Calero, who is on HIPAA form. Relayed lab results and echo results per VSP. Pt wife v/u and will inform Red Sandy of results. They will  lasix today from the pharmacy. Echo results-  There are minor findings that are noticed on the testing. The overall findings suggest normal heart strength.  Results, but there are degree of changes that will need to be monitored.  He has enlargement of the right atrium and the aorta.

## 2020-11-10 NOTE — PROGRESS NOTES
[address] [facility]   Cardiovascular Evaluation    PATIENT: Sneha Gudino  DATE: 2020  MRN: [id number]  CSN: [id number]  : 1955    Primary Care Doctor: Ralph Lamb MD    Reason for evaluation:   Follow-up; Shortness of Breath (with activity); Coronary Artery Disease; and Hypertension      Subjective: c/o SOB    History of present illness on initial date of evaluation:   Sneha Gudino is a 77 y.o. patient who presents for follow up. Most recent echo 2020 showed an EF of 55%; right ventricle is not well visualized but appears mildly dilated in size; right atrium is mildly enlarged; aortic root is mildly dilated at 4.0 cm; ascending aorta is moderately dilated at 4.4 cm. Today he reports that the Lasix did help with his shortness of breath. But the SOB is starting again with exertion since he is now off the Lasix. He is taking his medications as prescribed. Patient Active Problem List   Diagnosis    Chest pain    HTN (hypertension)    Morbid obesity with BMI of 50.0-59.9, adult ([facility] [address]    Hx pulmonary embolism    Shortness of breath    Chest discomfort    Diaphoresis    Abnormal stress echocardiogram    Coronary artery disease involving native coronary artery of native heart without angina pectoris         Cardiac Testing: I have reviewed the findings below. EKG:  ECHO:   STRESS TEST:  CATH:  BYPASS:  VASCULAR:    Past Medical History:   has a past medical history of H/O gastric bypass, Hypertension, Obesity, Panic attacks, and PE (pulmonary embolism). Surgical History:   has a past surgical history that includes Cholecystectomy (); Gastric bypass surgery (); and hernia repair (2017). Social History:   reports that he has never smoked. He has never used smokeless tobacco. He reports current alcohol use. He reports that he does not use drugs.      Family History:  No evidence for sudden cardiac death or premature All questions and concerns were addressed to the patient/family. Alternatives to my treatment were discussed. The note was completed using EMR. Every effort was made to ensure accuracy; however, inadvertent computerized transcription errors may be present.     Qian Myers MD, [address], [facility]  [phone] [facility] office  [phone] [facility]  11/11/2020  11:43 AM

## 2020-11-11 ENCOUNTER — OFFICE VISIT (OUTPATIENT)
Dept: CARDIOLOGY CLINIC | Age: 65
End: 2020-11-11
Payer: COMMERCIAL

## 2020-11-11 VITALS
DIASTOLIC BLOOD PRESSURE: 86 MMHG | HEIGHT: 69 IN | SYSTOLIC BLOOD PRESSURE: 136 MMHG | OXYGEN SATURATION: 95 % | HEART RATE: 75 BPM | BODY MASS INDEX: 46.65 KG/M2 | WEIGHT: 315 LBS

## 2020-11-11 PROCEDURE — 99213 OFFICE O/P EST LOW 20 MIN: CPT | Performed by: INTERNAL MEDICINE

## 2020-11-11 RX ORDER — POTASSIUM CHLORIDE 750 MG/1
10 TABLET, EXTENDED RELEASE ORAL 2 TIMES DAILY
Qty: 180 TABLET | Refills: 1 | Status: SHIPPED | OUTPATIENT
Start: 2020-11-11

## 2020-11-11 RX ORDER — FUROSEMIDE 40 MG/1
40 TABLET ORAL 2 TIMES DAILY
Qty: 180 TABLET | Refills: 1 | Status: SHIPPED | OUTPATIENT
Start: 2020-11-11 | End: 2021-12-03 | Stop reason: SDUPTHER

## 2020-11-11 NOTE — PATIENT INSTRUCTIONS
1. Start Lasix 40 mg twice a day with Potassium 10 meq twice a day for volume overload  2. Recommend weight loss: healthy eating and regular exercise. Referral to Dr. Hugo Phoenix  3. Follow up with Daryle Moores NP on our CHF team in a few months.

## 2020-11-11 NOTE — PROGRESS NOTES
[address] [facility]   Cardiovascular Evaluation    PATIENT: Parish Urias  DATE: 2020  MRN: [id number]  CSN: [id number]  : 1955    Primary Care Doctor: Lian Garcia MD    Reason for evaluation:   Follow-up; Shortness of Breath (with activity); Coronary Artery Disease; and Hypertension      Subjective:    History of present illness on initial date of evaluation:   Parish Urias is a 77 y.o. patient who presents for follow up. Today he reports that he had improved symptoms with the lasix but returned after the ten day course. He is still experiencing exertional shortness of breath with minimal exertion. This generally resolves after a few minutes of rest. He is concerned about congestive heart failure. Jarett Stanford has been taking his medications as prescribed without side effects. He has lost some weight since our last OV. Returned to today to monitor progress. Patient Active Problem List   Diagnosis    Chest pain    HTN (hypertension)    Morbid obesity with BMI of 50.0-59.9, adult ([address]    Hx pulmonary embolism    Shortness of breath    Chest discomfort    Diaphoresis    Abnormal stress echocardiogram    Coronary artery disease involving native coronary artery of native heart without angina pectoris         Cardiac Testing: I have reviewed the findings below. EKG:  ECHO:   STRESS TEST:  CATH:  BYPASS:  VASCULAR:    Past Medical History:   has a past medical history of H/O gastric bypass, Hypertension, Obesity, Panic attacks, and PE (pulmonary embolism). Surgical History:   has a past surgical history that includes Cholecystectomy (); Gastric bypass surgery (); and hernia repair (2017). Social History:   reports that he has never smoked. He has never used smokeless tobacco. He reports current alcohol use. He reports that he does not use drugs.      Family History:  No evidence for sudden cardiac death or premature CAD    Medications:  Reviewed and are listed in nursing record. and/or listed below  Outpatient Medications:  Prior to Admission medications    Medication Sig Start Date End Date Taking? Authorizing Provider   furosemide (LASIX) 40 MG tablet Take 1 tablet by mouth 2 times daily Take one tablet daily for 10 days. 11/11/20  Yes Mariana Killian MD   potassium chloride (KLOR-CON M) 10 MEQ extended release tablet Take 1 tablet by mouth 2 times daily 11/11/20  Yes Mariana Killian MD   LISINOPRIL PO Take by mouth   Yes Historical Provider, MD   isosorbide mononitrate (IMDUR) 30 MG extended release tablet TAKE 1 TABLET BY MOUTH EVERY DAY 6/1/20  Yes Mariana Killian MD   ALPRAZolam Price Chambers) 0.5 MG tablet Take 0.5 mg by mouth. .   Yes Historical Provider, MD   atorvastatin (LIPITOR) 10 MG tablet TAKE 1 TABLET BY MOUTH EVERY EVENING 8/17/18  Yes Larry Christine MD   Multiple Vitamins-Minerals (CENTRUM SILVER PO) Take 1 tablet by mouth daily   Yes Historical Provider, MD   Calcium Citrate (CITRACAL PO) Take 1 tablet by mouth daily   Yes Historical Provider, MD   nitroGLYCERIN (NITROSTAT) 0.4 MG SL tablet up to max of 3 total doses. If no relief after 1 dose, call 911. 3/17/17  Yes Matheus Dukes MD   warfarin (COUMADIN) 5 MG tablet 10 mg on Sun Tuesday Thursday and Saturday. 7.5 mg Monday Wednesday and Friday  Patient taking differently: Take 10 mg by mouth daily  3/17/17  Yes Matheus Dukes MD   aspirin 81 MG tablet Take 81 mg by mouth daily. Yes Historical Provider, MD   albuterol sulfate HFA (PROVENTIL HFA) 108 (90 Base) MCG/ACT inhaler Inhale 2 puffs into the lungs every 4 hours as needed for Wheezing  Patient not taking: Reported on 11/11/2020 6/16/20 6/16/21  Berfrancesca Chew, DO       In-patient schedule medications:    Infusion Medications: Allergies:  Patient has no known allergies. Review of Systems:   All 12 point review of symptoms completed.  Pertinent positives identified in the HPI, all other review of symptoms negative as below.    Physical Examination:    Vitals:    11/11/20 1134   BP: 136/86   Pulse: 75   SpO2: 95%    Weight: (!) 408 lb (185.1 kg)     Wt Readings from Last 3 Encounters:   11/11/20 (!) 408 lb (185.1 kg)   09/18/20 (!) 420 lb 8 oz (190.7 kg)   06/15/20 (!) 400 lb (181.4 kg)     No intake or output data in the 24 hours ending 11/11/20 1324    General Appearance:  Alert, cooperative, no distress, appears stated age   Head:  Normocephalic, without obvious abnormality, atraumatic   Eyes:  PERRL, conjunctiva/corneas clear       Nose: Nares normal, no drainage or sinus tenderness   Throat: Lips, mucosa, and tongue normal   Neck: Supple, symmetrical, trachea midline, no adenopathy, thyroid: not enlarged, symmetric, no tenderness/mass/nodules, no carotid bruit or ? JVD       Lungs:   Clear to auscultation bilaterally, respirations unlabored   Chest Wall:  No tenderness or deformity   Heart:  Regular rhythm and normal rate; S1, S2 are normal; no murmur noted; no rub or gallop   Abdomen:   Soft, obese, non-tender, bowel sounds active all four quadrants,  no masses, no organomegaly           Extremities: Extremities normal, atraumatic, no cyanosis. + edema to hips   Pulses: 2+ and symmetric   Skin: Skin color, texture, turgor normal, no rashes or lesions   Pysch: Normal mood and affect   Neurologic: Normal gross motor and sensory exam.         Labs      Imaging:  I have reviewed the below testing personally and my interpretation is below. EKG:  CXR:      Assessment:  77 y.o. patient with:  1. Shortness of breath due to CHF and obesity   ~exertional   ~weight gain  2. Coronary artery disease    ~Echo 2016 55-60% - care everywhere    ~cath 3/2017 70% ostial OM1   ~med management  3. Hypertension   ~BP: (136)/(86)     ~stable  4. Obesity   ~Hx gastric bypass   ~Body mass index is 60.25 kg/m². ~Weight: (!) 408 lb (185.1 kg)     Plan:  1. Resume lasix 40mg bid with potassium supplements  2. Referral to weight loss team  3.  See CHF NP in a few months    All questions and concerns were addressed to the patient/family. Alternatives to my treatment were discussed. The note was completed using EMR. Every effort was made to ensure accuracy; however, inadvertent computerized transcription errors may be present.     Ligia Crocker MD, [address], [place], [place]  [phone] [facility]  [phone] [facility]  11/11/2020  1:24 PM

## 2020-11-30 RX ORDER — ISOSORBIDE MONONITRATE 30 MG/1
TABLET, EXTENDED RELEASE ORAL
Qty: 90 TABLET | Refills: 3 | Status: SHIPPED | OUTPATIENT
Start: 2020-11-30 | End: 2021-11-10

## 2021-11-10 RX ORDER — ISOSORBIDE MONONITRATE 30 MG/1
TABLET, EXTENDED RELEASE ORAL
Qty: 90 TABLET | Refills: 0 | Status: SHIPPED | OUTPATIENT
Start: 2021-11-10 | End: 2022-02-08

## 2021-12-03 ENCOUNTER — OFFICE VISIT (OUTPATIENT)
Dept: CARDIOLOGY CLINIC | Age: 66
End: 2021-12-03
Payer: COMMERCIAL

## 2021-12-03 VITALS
BODY MASS INDEX: 46.65 KG/M2 | HEART RATE: 88 BPM | WEIGHT: 315 LBS | SYSTOLIC BLOOD PRESSURE: 152 MMHG | HEIGHT: 69 IN | OXYGEN SATURATION: 97 % | DIASTOLIC BLOOD PRESSURE: 78 MMHG

## 2021-12-03 DIAGNOSIS — I25.10 CORONARY ARTERY DISEASE INVOLVING NATIVE CORONARY ARTERY OF NATIVE HEART WITHOUT ANGINA PECTORIS: ICD-10-CM

## 2021-12-03 DIAGNOSIS — I10 PRIMARY HYPERTENSION: Primary | Chronic | ICD-10-CM

## 2021-12-03 DIAGNOSIS — E66.01 CLASS 3 SEVERE OBESITY WITH BODY MASS INDEX (BMI) OF 60.0 TO 69.9 IN ADULT, UNSPECIFIED OBESITY TYPE, UNSPECIFIED WHETHER SERIOUS COMORBIDITY PRESENT (HCC): ICD-10-CM

## 2021-12-03 PROBLEM — E66.813 CLASS 3 SEVERE OBESITY WITH BODY MASS INDEX (BMI) OF 60.0 TO 69.9 IN ADULT: Status: ACTIVE | Noted: 2017-03-16

## 2021-12-03 PROCEDURE — 99214 OFFICE O/P EST MOD 30 MIN: CPT | Performed by: INTERNAL MEDICINE

## 2021-12-03 RX ORDER — LISINOPRIL 20 MG/1
TABLET ORAL
COMMUNITY
Start: 2021-10-21

## 2021-12-03 RX ORDER — FUROSEMIDE 40 MG/1
TABLET ORAL
Qty: 90 TABLET | Refills: 3 | Status: SHIPPED | OUTPATIENT
Start: 2021-12-03

## 2021-12-03 RX ORDER — RIVAROXABAN 10 MG/1
10 TABLET, FILM COATED ORAL
COMMUNITY

## 2021-12-03 NOTE — PATIENT INSTRUCTIONS
Plan:  1. Recommend a cardiac healthy diet (low salt, avoid red meat, avoid fatty or fried foods, lots of fruits and vegetables) as well as regular moderate intensity activity for 30 minutes per day 3-5 times per week. 2. Recommend seeing Dr. Olesya Rico for weight loss management. Can also follow up with former weight loss doctor  3. Resume Lasix at 40 mg daily  4. BMP in 2 weeks  5.  Follow up with me in 3 months

## 2021-12-03 NOTE — PROGRESS NOTES
1516  [facility]   Cardiovascular Evaluation    PATIENT: Sb Barrera  DATE: 12/3/2021  MRN: [id number]  CSN: [id number]  : 1955    Primary Care Doctor: Mk Mathur MD    Reason for evaluation:   Coronary Artery Disease, Hypertension, and Shortness of Breath      Subjective:    History of present illness on initial date of evaluation:   Sb Barrera is a 74 y.o. patient with a past medical history of coronary artery disease, hypertension, congestive heart failure and hyperlipidemia. EKG in 3/2017 shows NSR. Left heart cath in 3/2017 no need for intervention. Echo and stress test on 10/2021 were both unremarkable. Today he is doing well. He is dealing with the passing of sister in law this morning. Two months ago he experienced a syncopal episode while with his wife at a restaurant. Prior to the episode he felt very dizzy. He was evaluated at [facility] and was found to have very low blood pressure likely due to dehydration per basic metabolic panel. Patient is taking all cardiac medications as prescribed and tolerates them well. He was recently switched from warfarin to xarelto. His PCP stopped lasix due to abnormal kidney levels. Patient denies current edema, chest pain, sob, palpitations. Patient Active Problem List   Diagnosis    Chest pain    HTN (hypertension)    Class 3 severe obesity with body mass index (BMI) of 60.0 to 69.9 in adult ([facility] [address]    Hx pulmonary embolism    Shortness of breath    Chest discomfort    Diaphoresis    Abnormal stress echocardiogram    Coronary artery disease involving native coronary artery of native heart without angina pectoris           Past Medical History:   has a past medical history of H/O gastric bypass, Hypertension, Obesity, Panic attacks, and PE (pulmonary embolism). Surgical History:   has a past surgical history that includes Cholecystectomy ();  Gastric bypass surgery (); and hernia repair (08/01/2017). Social History:   reports that he has never smoked. He has never used smokeless tobacco. He reports current alcohol use. He reports that he does not use drugs. Family History:  No evidence for sudden cardiac death or premature CAD    Medications:  Reviewed and are listed in nursing record. and/or listed below  Outpatient Medications:  Prior to Admission medications    Medication Sig Start Date End Date Taking? Authorizing Provider   rivaroxaban (XARELTO) 10 MG TABS tablet Take 10 mg by mouth   Yes Historical Provider, MD   lisinopril (PRINIVIL;ZESTRIL) 20 MG tablet TAKE 1 TABLET BY MOUTH DAILY 10/21/21  Yes Historical Provider, MD   furosemide (LASIX) 40 MG tablet Take one tablet daily 12/3/21  Yes Ninfa Delgadillo MD   isosorbide mononitrate (IMDUR) 30 MG extended release tablet TAKE 1 TABLET BY MOUTH EVERY DAY 11/10/21  Yes Tomás Traore MD   potassium chloride (KLOR-CON M) 10 MEQ extended release tablet Take 1 tablet by mouth 2 times daily 11/11/20  Yes Ninfa Delgadillo MD   atorvastatin (LIPITOR) 10 MG tablet TAKE 1 TABLET BY MOUTH EVERY EVENING 8/17/18  Yes Tomás Traore MD   Multiple Vitamins-Minerals (CENTRUM SILVER PO) Take 1 tablet by mouth daily   Yes Historical Provider, MD   Calcium Citrate (CITRACAL PO) Take 1 tablet by mouth daily   Yes Historical Provider, MD   aspirin 81 MG tablet Take 81 mg by mouth daily. Yes Historical Provider, MD   albuterol sulfate HFA (PROVENTIL HFA) 108 (90 Base) MCG/ACT inhaler Inhale 2 puffs into the lungs every 4 hours as needed for Wheezing  Patient not taking: Reported on 11/11/2020 6/16/20 6/16/21  Gilbert Johnson DO   ALPRAZolam Cordella Olya) 0.5 MG tablet Take 0.5 mg by mouth. .  Patient not taking: Reported on 12/3/2021    Historical Provider, MD   nitroGLYCERIN (NITROSTAT) 0.4 MG SL tablet up to max of 3 total doses. If no relief after 1 dose, call 911.   Patient not taking: Reported on 12/3/2021 3/17/17   Grace Bonilla MD   warfarin (COUMADIN) 5 MG tablet 10 mg on Sun Tuesday Thursday and Saturday. 7.5 mg Monday Wednesday and Friday  Patient not taking: Reported on 12/3/2021 3/17/17   Rosario Lama MD       In-patient schedule medications:    Infusion Medications: Allergies:  Patient has no known allergies. Review of Systems:   All 12 point review of symptoms completed. Pertinent positives identified in the HPI, all other review of symptoms negative as below. Physical Examination:    Vitals:    12/03/21 1428   BP: (!) 152/78   Pulse: 88   SpO2: 97%    Weight: (!) 413 lb (187.3 kg)     Wt Readings from Last 3 Encounters:   12/03/21 (!) 413 lb (187.3 kg)   11/11/20 (!) 408 lb (185.1 kg)   09/18/20 (!) 420 lb 8 oz (190.7 kg)     No intake or output data in the 24 hours ending 12/03/21 1621    General Appearance:  Alert, cooperative, no distress, appears stated age   Head:  Normocephalic, without obvious abnormality, atraumatic   Eyes:  PERRL, conjunctiva/corneas clear       Nose: Nares normal, no drainage or sinus tenderness   Throat: Lips, mucosa, and tongue normal   Neck: Supple, symmetrical, trachea midline, no adenopathy, thyroid: not enlarged, symmetric, no tenderness/mass/nodules, no carotid bruit or ? JVD       Lungs:   Clear to auscultation bilaterally, respirations unlabored   Chest Wall:  No tenderness or deformity   Heart:  Regular rhythm and normal rate; S1, S2 are normal; no murmur noted; no rub or gallop   Abdomen:   Soft, obese, non-tender, bowel sounds active all four quadrants,  no masses, no organomegaly           Extremities: Extremities normal, atraumatic, no cyanosis. + edema to hips   Pulses: 2+ and symmetric   Skin: Skin color, texture, turgor normal, no rashes or lesions   Pysch: Normal mood and affect   Neurologic: Normal gross motor and sensory exam.         Labs        Cardiac Testing: I have reviewed the findings below.     EKG 3/2017: NSR    ECHO 10/2021 [facility]:  Summary:   The left ventricular function is normal.   Overall left ventricular ejection fraction is estimated to be 60-65%. The left ventricular wall motion is normal.   Right ventricular systolic pressure is mildly elevated at 35-45 mmHg. 4.2 cm aortic root aneurysm     ECHO 9/2020:  Summary   Technically difficult examination due to body habitus. Definity® used for   myocardial border enhancement. Normal left ventricle size, wall thickness, and systolic function with a   visually estimated ejection fraction of 55%. No regional wall motion abnormalities are seen. Normal left ventricular diastolic filling pressure. The right ventricle is not well visualized but appears mildly dilated in   size. Right ventricular systolic function is normal.   The right atrium is mildly enlarged. The aortic root is mildly dilated at 4.0 cm. The ascending aorta is moderately dilated at 4.4 cm. The IVC is dilated in size (>2.1 cm) but collapses >50% with respiration   consistent with elevated right atrial pressures (8 mmHg) . STRESS TEST 10/2021 [facility]:  No reversible perfusion defect to suggest myocardial ischemia. Fixed perfusion defect in the lateral wall of the left ventricle, suggestive of myocardial infarct in the expected territory of the left circumflex and LAD    CATH: Left Heart Cath 3/2017  1. Moderate Coronary disease              ~70% osital OM1 - not favorable for PCI  2. Normal hemodynamics    BYPASS:    VASCULAR 2/2013:  OVERALL IMPRESSION:                                               1.  No hemodynamically _____ are identified in the internal       common carotid arteries bilaterally. 2.  Antegrade flow in vertebral arteries bilaterally. Assessment:  74 y.o. patient with:   Diagnosis Orders   1. Primary hypertension     2.  Coronary artery disease involving native coronary artery of native heart without angina pectoris  furosemide (LASIX) 40 MG tablet    BASIC METABOLIC PANEL   3. Class 3 severe obesity with body mass index (BMI) of 60.0 to 69.9 in adult, unspecified obesity type, unspecified whether serious comorbidity present [facility])  Cammy Schultz MD, Bariatric Surgery, [facility]     Plan:  1. Recommend a cardiac healthy diet (low salt, avoid red meat, avoid fatty or fried foods, lots of fruits and vegetables) as well as regular moderate intensity activity for 30 minutes per day 3-5 times per week. 2. Recommend seeing Dr. Sanjana Cortes for weight loss management. Can also follow up with former weight loss doctor  3. Resume Lasix at 40 mg daily  4. BMP in 2 weeks  5. Follow up with me in 3 months      This note was scribed in the presence of by Johnny Arndt MD,Chandni Galeano RN.     I, Dr. Johnny Arndt, personally performed the services described in this documentation, as scribed by the above signed scribe in my presence. It is both accurate and complete to my knowledge. I agree with the details independently gathered by the clinical support staff, while the remaining scribed note accurately describes my personal service to the patient. Medical Decision Making: The following items were considered in medical decision making:  Independent review of images  Review / order clinical lab tests  Review / order radiology tests  Decision to obtain old records  Review and summation of old records as accessed through [facility] (a summary of my findings in these old records)      Time Based Itemization  A total of 30 minutes was spent on today's patient encounter.   If applicable, non-patient-facing activities:  (X)Preparing to see the patient and reviewing records  (X) Individual interpretation of results  ( ) Discussion or coordination of care with other health care professionals  () Ordering of unique tests, medications, or procedures  (X) Documentation within the [address] [place] [address] MD Wil, [address], [address] [facility], [address] [facility] [place] office  [phone] [facility]  12/3/2021  4:21 PM

## 2022-02-08 RX ORDER — ISOSORBIDE MONONITRATE 30 MG/1
TABLET, EXTENDED RELEASE ORAL
Qty: 90 TABLET | Refills: 3 | Status: SHIPPED | OUTPATIENT
Start: 2022-02-08

## 2022-11-04 ENCOUNTER — TELEPHONE (OUTPATIENT)
Dept: CARDIOLOGY CLINIC | Age: 67
End: 2022-11-04

## 2022-11-04 NOTE — TELEPHONE ENCOUNTER
11/04 pt called and stated he seen pcp earlier this week and they had ordered him a stress test to be done and pcp stated to pt that stress test showed a possible/ partial blockage, I went ahead and scheduled pt to be seen by [facility] on 12/13, pt wondering if he needs to be seen sooner, prefers MD. Please advise

## 2022-11-07 NOTE — TELEPHONE ENCOUNTER
Called and spoke with patient added on to 11/11/22 schedule. Patient VU to time, date, and location of appt.

## 2022-11-11 ENCOUNTER — TELEPHONE (OUTPATIENT)
Dept: CARDIOLOGY CLINIC | Age: 67
End: 2022-11-11

## 2022-11-11 ENCOUNTER — OFFICE VISIT (OUTPATIENT)
Dept: CARDIOLOGY CLINIC | Age: 67
End: 2022-11-11
Payer: COMMERCIAL

## 2022-11-11 VITALS
WEIGHT: 315 LBS | HEART RATE: 86 BPM | HEIGHT: 69 IN | DIASTOLIC BLOOD PRESSURE: 90 MMHG | OXYGEN SATURATION: 95 % | BODY MASS INDEX: 46.65 KG/M2 | SYSTOLIC BLOOD PRESSURE: 138 MMHG

## 2022-11-11 DIAGNOSIS — R06.02 SHORTNESS OF BREATH: ICD-10-CM

## 2022-11-11 DIAGNOSIS — R94.39 ABNORMAL CARDIOVASCULAR STRESS TEST: Primary | ICD-10-CM

## 2022-11-11 DIAGNOSIS — I25.10 CORONARY ARTERY DISEASE INVOLVING NATIVE CORONARY ARTERY OF NATIVE HEART WITHOUT ANGINA PECTORIS: ICD-10-CM

## 2022-11-11 DIAGNOSIS — E66.01 MORBID OBESITY WITH BMI OF 50.0-59.9, ADULT (HCC): ICD-10-CM

## 2022-11-11 PROCEDURE — 1123F ACP DISCUSS/DSCN MKR DOCD: CPT | Performed by: INTERNAL MEDICINE

## 2022-11-11 PROCEDURE — 3078F DIAST BP <80 MM HG: CPT | Performed by: INTERNAL MEDICINE

## 2022-11-11 PROCEDURE — 99215 OFFICE O/P EST HI 40 MIN: CPT | Performed by: INTERNAL MEDICINE

## 2022-11-11 PROCEDURE — 3074F SYST BP LT 130 MM HG: CPT | Performed by: INTERNAL MEDICINE

## 2022-11-11 RX ORDER — ERGOCALCIFEROL 1.25 MG/1
1 CAPSULE ORAL 2 TIMES DAILY
COMMUNITY
Start: 2022-10-05

## 2022-11-11 NOTE — LETTER
[address] Cardiology - [address]  Phone: [phone]  Fax: [phone]    Carrie Guaman MD    November 14, 2022     Dorinda Alarcon MD  [address] [place] [address],[address] [place], [place] [id number]    Patient: Levi Leggett   MR Number: [id number]   YOB: 1955   Date of Visit: 11/11/2022       Dear Dorinda Alarcon:    Thank you for referring Ashanti Santos to me for evaluation/treatment. Below are the relevant portions of my assessment and plan of care. If you have questions, please do not hesitate to call me. I look forward to following Astrid Grace along with you.     Sincerely,      Carrie Guaman MD

## 2022-11-11 NOTE — PATIENT INSTRUCTIONS
Plan:  When you have recovered from flu undergo Angiogram.   Order left heart catheterization (angiogram) ~   ~ NPO (nothing to eat or drink) after midnight  ~ Hold Lasix day of procedure  ~ Take all other prescribed medications morning of procedure with sip of water.  ~ Pack over night bag  ~ No lotion or cream to skin day of procedure. Weight loss discussed.       3. Keep your appointment in December with me 12/13/2022 @ 2:15pm

## 2022-11-11 NOTE — LETTER
[address] Cardiology - [address] [facility]  Phone: [phone]  Fax: [phone]    Cheryl Rob MD    November 14, 2022     Lacey Barajas MD  [address] [place] [address],[address] [facility] [address]    Patient: Annmarie Guevara   MR Number: [id number]   YOB: 1955   Date of Visit: 11/11/2022       Dear Lacey Barajas:    Thank you for referring Karlo Baugh to me for evaluation/treatment. Below are the relevant portions of my assessment and plan of care. If you have questions, please do not hesitate to call me. I look forward to following Lacey Salazar along with you.     Sincerely,      Cheryl Rob MD

## 2022-11-11 NOTE — PROGRESS NOTES
[facility] [facility]   Cardiovascular Evaluation    PATIENT: Susanna Barbour  DATE: 2022  MRN: [id number]  CSN: [id number]  : 1955    Primary Care Doctor: Shila Jarquin MD    Reason for evaluation:   Follow-up (Abnormal stress test/Pt states that he was seen in the ED recently he tested Positive for Flu A), Hypertension, Coronary Artery Disease, and Shortness of Breath      Subjective:    History of present illness on initial date of evaluation:   Susanna Barbour is a 69 y.o. patient with a past medical history of coronary artery disease, hypertension, congestive heart failure and hyperlipidemia. EKG in 3/2017 shows NSR. Left heart cath in 3/2017 no need for intervention. Echo and stress test on 10/2021 were both unremarkable. At last office visit 12/3/2021 he reported he experienced a syncopal episode while with his wife at a restaurant. Prior to the episode he felt very dizzy. He was evaluated at [facility] and was found to have very low blood pressure likely due to dehydration per basic metabolic panel. Since last office visit patient has been undergoing testing with PCP for SOB. He had Stress test 10/27/22 at [facility] which showed possible area of mild ischemia and was advised to follow up with cardiologist.   Today patient is here today due to worsening SOB particular on exertion. He reports he recently tested positive for Flu A. Patient is taking all cardiac medications as prescribed and tolerates them well. Patient denies current edema, chest pain, palpitations, dizziness or syncope.        Patient Active Problem List   Diagnosis    Chest pain    HTN (hypertension)    Morbid obesity with BMI of 50.0-59.9, adult (HCC)    Hx pulmonary embolism    Shortness of breath    Chest discomfort    Diaphoresis    Abnormal cardiovascular stress test    Coronary artery disease involving native coronary artery of native heart without angina pectoris       Past Medical History:   has a past medical history of H/O gastric bypass, Hypertension, Obesity, Panic attacks, and PE (pulmonary embolism). Surgical History:   has a past surgical history that includes Cholecystectomy (2009); Gastric bypass surgery (2014); and hernia repair (08/01/2017). Social History:   reports that he has never smoked. He has never used smokeless tobacco. He reports current alcohol use. He reports that he does not use drugs. Family History:  No evidence for sudden cardiac death or premature CAD    Medications:  Reviewed and are listed in nursing record. and/or listed below  Outpatient Medications:  Prior to Admission medications    Medication Sig Start Date End Date Taking? Authorizing Provider   vitamin D (ERGOCALCIFEROL) 1.25 MG (66747 UT) CAPS capsule Take 1 capsule by mouth in the morning and at bedtime 10/5/22  Yes Historical Provider, MD   isosorbide mononitrate (IMDUR) 30 MG extended release tablet TAKE 1 TABLET BY MOUTH EVERY DAY 2/8/22  Yes Jennifer Dow MD   rivaroxaban (XARELTO) 10 MG TABS tablet Take 10 mg by mouth   Yes Historical Provider, MD   lisinopril (PRINIVIL;ZESTRIL) 20 MG tablet TAKE 1 TABLET BY MOUTH DAILY 10/21/21  Yes Historical Provider, MD   aspirin 81 MG tablet Take 81 mg by mouth daily. Yes Historical Provider, MD   furosemide (LASIX) 40 MG tablet Take one tablet daily  Patient not taking: Reported on 11/11/2022 12/3/21   Ivan Cuevas MD   potassium chloride (KLOR-CON M) 10 MEQ extended release tablet Take 1 tablet by mouth 2 times daily  Patient not taking: Reported on 11/11/2022 11/11/20   Ivan Cuevas MD   albuterol sulfate HFA (PROVENTIL HFA) 108 (90 Base) MCG/ACT inhaler Inhale 2 puffs into the lungs every 4 hours as needed for Wheezing  Patient not taking: Reported on 11/11/2020 6/16/20 6/16/21  Kymlandon Cherry,    ALPRAZolam Susie Regulus) 0.5 MG tablet Take 0.5 mg by mouth. .  Patient not taking: No sig reported    Historical Provider, MD atorvastatin (LIPITOR) 10 MG tablet TAKE 1 TABLET BY MOUTH EVERY EVENING  Patient not taking: Reported on 11/11/2022 8/17/18   Arnold Herzog MD   Multiple Vitamins-Minerals (CENTRUM SILVER PO) Take 1 tablet by mouth daily  Patient not taking: Reported on 11/11/2022    Historical Provider, MD   Calcium Citrate (CITRACAL PO) Take 1 tablet by mouth daily  Patient not taking: Reported on 11/11/2022    Historical Provider, MD   nitroGLYCERIN (NITROSTAT) 0.4 MG SL tablet up to max of 3 total doses. If no relief after 1 dose, call 911. Patient not taking: Reported on 12/3/2021 3/17/17   Char Fowler MD   warfarin (COUMADIN) 5 MG tablet 10 mg on Sun Tuesday Thursday and Saturday. 7.5 mg Monday Wednesday and Friday  Patient not taking: No sig reported 3/17/17   Char Fowler MD       In-patient schedule medications:    Infusion Medications: Allergies:  Patient has no known allergies. Review of Systems:   All 12 point review of symptoms completed. Pertinent positives identified in the HPI, all other review of symptoms negative as below. Physical Examination:    Vitals:    11/11/22 1343   BP: (!) 138/90   Pulse: 86   SpO2: 95%      Weight: (!) 423 lb (191.9 kg)     Wt Readings from Last 3 Encounters:   11/11/22 (!) 423 lb (191.9 kg)   12/03/21 (!) 413 lb (187.3 kg)   11/11/20 (!) 408 lb (185.1 kg)     No intake or output data in the 24 hours ending 11/11/22 1420    Due to the current efforts to prevent transmission of FLU and also the need to preserve PPE for other caregivers, an observational only full face-to-face encounter with the patient was performed. That being said, all relevant records and diagnostic tests were reviewed, including laboratory results and imaging. Please reference any relevant documentation elsewhere.  Care will be coordinated with the primary service    General Appearance:  Alert, cooperative, no distress, appears stated age   Head:  Normocephalic, without obvious abnormality, atraumatic   Eyes:  PERRL, conjunctiva/corneas clear       Nose: Nares normal, no drainage or sinus tenderness   Throat: Lips, mucosa, and tongue normal   Neck: Supple, symmetrical, trachea midline, no adenopathy, thyroid: not enlarged, symmetric, no JVD       Lungs:   Respirations unlabored and no distress   Chest Wall:  deferred   Heart:  Regular rhythm and normal rate   Abdomen:   Not distended but morbidly obese           Extremities: Extremities normal, atraumatic, no cyanosis. + edema   Pulses: deferred   Skin: Skin color, texture, turgor normal, no rashes or lesions   Pysch: Normal mood and affect   Neurologic: Observational exam with normal gross motor and sensory exam.         Labs        Cardiac Testing: I have reviewed the findings below. Stress Test (Farideh) at [facility] 40: 10/27/2022  Interpetation Summary:   1. No chest pain after regadenoson injection. 2. No evidence of stress-induced ischemia on EKG. 3. Possible area of mild ischemia in the territory subtended by the apical   LAD. No high risk features. 4. Normal LV systolic function. ECHO 10/2021 [facility]:  Summary:   The left ventricular function is normal.   Overall left ventricular ejection fraction is estimated to be 60-65%. The left ventricular wall motion is normal.   Right ventricular systolic pressure is mildly elevated at 35-45 mmHg. 4.2 cm aortic root aneurysm     STRESS TEST 10/2021 [facility]:  No reversible perfusion defect to suggest myocardial ischemia. Fixed perfusion defect in the lateral wall of the left ventricle, suggestive of myocardial infarct in the expected territory of the left circumflex and LAD    ECHO 9/2020:  Summary   Technically difficult examination due to body habitus. Definity® used for   myocardial border enhancement. Normal left ventricle size, wall thickness, and systolic function with a   visually estimated ejection fraction of 55%. No regional wall motion abnormalities are seen.    Normal left ventricular diastolic filling pressure. The right ventricle is not well visualized but appears mildly dilated in   size. Right ventricular systolic function is normal.   The right atrium is mildly enlarged. The aortic root is mildly dilated at 4.0 cm. The ascending aorta is moderately dilated at 4.4 cm. The IVC is dilated in size (>2.1 cm) but collapses >50% with respiration   consistent with elevated right atrial pressures (8 mmHg) . CATH: Left Heart Cath 3/2017  1. Moderate Coronary disease              ~70% osital OM1 - not favorable for PCI  2. Normal hemodynamics    EKG 3/2017: NSR    BYPASS:  VASCULAR 2/2013:  OVERALL IMPRESSION:                                               1.  No hemodynamically _____ are identified in the internal       common carotid arteries bilaterally. 2.  Antegrade flow in vertebral arteries bilaterally. Assessment:  69 y.o. patient with:   Diagnosis Orders   1. Abnormal cardiovascular stress test        2. Shortness of breath  Left heart cath      3. Coronary artery disease involving native coronary artery of native heart without angina pectoris  Left heart cath      4. Morbid obesity with BMI of 50.0-59.9, adult ([facility] [address]            Plan:  When you have recovered from flu undergo Angiogram.   Order left heart catheterization (angiogram) ~   ~ NPO (nothing to eat or drink) after midnight  ~ Hold Lasix day of procedure  ~ Take all other prescribed medications morning of procedure with sip of water.  ~ Pack over night bag  ~ No lotion or cream to skin day of procedure. Weight loss discussed. 3. Keep your appointment in December with me 12/13/2022 @ 2:15pm    Scribe's attestation: This note was scribed in the presence of Shana Angel by Shane Patton RN      I, Dr. Marissa Rios, personally performed the services described in this documentation, as scribed by the above signed scribe in my presence.  It is both accurate and complete to my knowledge. I agree with the details independently gathered by the clinical support staff, while the remaining scribed note accurately describes my personal service to the patient. Medical Decision Making: The following items were considered in medical decision making:  Independent review of images  Review / order clinical lab tests  Review / order radiology tests  Decision to obtain old records  Review and summation of old records as accessed through [facility] (a summary of my findings in these old records)      Time Based Itemization  A total of 40 minutes was spent on today's patient encounter.   If applicable, non-patient-facing activities:  (X)Preparing to see the patient and reviewing records  (X) Individual interpretation of results  ( ) Discussion or coordination of care with other health care professionals  () Ordering of unique tests, medications, or procedures  (X) Documentation within the R Elan Mendoza MD, [address], [facility], [facility] [place] office  [phone] [facility]  11/11/2022  2:20 PM

## 2022-11-11 NOTE — TELEPHONE ENCOUNTER
11/11 Chanelle from [facility] called to let [facility] know to call [phone] to relay stress test results?  Please advise

## 2022-11-29 NOTE — TELEPHONE ENCOUNTER
Spoke with patient. Advised him we will check with insurance to see if any other testing may be needed. Jdjackie Mercedes can you check please? He had echo, stress and a ct angio chest for PE but not cardiac cta (under care everywhere).

## 2022-12-01 NOTE — TELEPHONE ENCOUNTER
Spoke with patient's wife. Patient is scheduled with Dr. Mariam Urban for Left Heart Cath on 12/9/22 at [address], arrival time of 6:30am to the Cath Lab. Please have patient arrive to the main entrance of [facility] and check in with the registration desk. Remind patient to be NPO after midnight (8 hours prior). Do not apply lotions/creams on skin the day of procedure.

## 2022-12-01 NOTE — TELEPHONE ENCOUNTER
Pt called to get c scheduled. Pt stated that on 12/1 before 12pm he can be reached at [phone] and after that he can be reached on mobile or home.

## 2022-12-02 NOTE — TELEPHONE ENCOUNTER
I spoke with the patient and answered his questions regarding the angiogram. He V/U.  I discussed the procedure instructions and medications with the patient and he V/U. Consent 1/Introductory Paragraph: The rationale for Mohs was explained to the patient and consent was obtained. The risks, benefits and alternatives to therapy were discussed in detail. Specifically, the risks of infection, scarring, bleeding, prolonged wound healing, incomplete removal, allergy to anesthesia, nerve injury and recurrence were addressed. Prior to the procedure, the treatment site was clearly identified and confirmed by the patient. All components of Universal Protocol/PAUSE Rule completed.

## 2022-12-09 ENCOUNTER — HOSPITAL ENCOUNTER (INPATIENT)
Dept: CARDIAC CATH/INVASIVE PROCEDURES | Age: 67
LOS: 2 days | Discharge: ANOTHER HOSPITAL | DRG: 287 | End: 2022-12-11
Attending: INTERNAL MEDICINE | Admitting: INTERNAL MEDICINE
Payer: COMMERCIAL

## 2022-12-09 DIAGNOSIS — Z86.711 HX PULMONARY EMBOLISM: Primary | Chronic | ICD-10-CM

## 2022-12-09 PROBLEM — I25.10 CAD IN NATIVE ARTERY: Status: ACTIVE | Noted: 2022-12-09

## 2022-12-09 LAB
ANION GAP SERPL CALCULATED.3IONS-SCNC: 8 MMOL/L (ref 3–16)
BUN BLDV-MCNC: 22 MG/DL (ref 7–20)
CALCIUM SERPL-MCNC: 8.3 MG/DL (ref 8.3–10.6)
CHLORIDE BLD-SCNC: 106 MMOL/L (ref 99–110)
CHOLESTEROL, TOTAL: 130 MG/DL (ref 0–199)
CO2: 30 MMOL/L (ref 21–32)
CREAT SERPL-MCNC: 1 MG/DL (ref 0.8–1.3)
EKG ATRIAL RATE: 77 BPM
EKG DIAGNOSIS: NORMAL
EKG P-R INTERVAL: 158 MS
EKG Q-T INTERVAL: 386 MS
EKG QRS DURATION: 88 MS
EKG QTC CALCULATION (BAZETT): 436 MS
EKG R AXIS: 45 DEGREES
EKG T AXIS: 50 DEGREES
EKG VENTRICULAR RATE: 77 BPM
GFR SERPL CREATININE-BSD FRML MDRD: >60 ML/MIN/{1.73_M2}
GLUCOSE BLD-MCNC: 102 MG/DL (ref 70–99)
HCT VFR BLD CALC: 27.7 % (ref 40.5–52.5)
HDLC SERPL-MCNC: 41 MG/DL (ref 40–60)
HEMOGLOBIN: 8.3 G/DL (ref 13.5–17.5)
INR BLD: 1.45 (ref 0.87–1.14)
LDL CHOLESTEROL CALCULATED: 79 MG/DL
MCH RBC QN AUTO: 21.9 PG (ref 26–34)
MCHC RBC AUTO-ENTMCNC: 30.2 G/DL (ref 31–36)
MCV RBC AUTO: 72.7 FL (ref 80–100)
PDW BLD-RTO: 16.9 % (ref 12.4–15.4)
PLATELET # BLD: 216 K/UL (ref 135–450)
PMV BLD AUTO: 6.6 FL (ref 5–10.5)
POTASSIUM REFLEX MAGNESIUM: 4.2 MMOL/L (ref 3.5–5.1)
PROTHROMBIN TIME: 17.6 SEC (ref 11.7–14.5)
RBC # BLD: 3.81 M/UL (ref 4.2–5.9)
SODIUM BLD-SCNC: 144 MMOL/L (ref 136–145)
TRIGL SERPL-MCNC: 49 MG/DL (ref 0–150)
VLDLC SERPL CALC-MCNC: 10 MG/DL
WBC # BLD: 5.9 K/UL (ref 4–11)

## 2022-12-09 PROCEDURE — 99152 MOD SED SAME PHYS/QHP 5/>YRS: CPT | Performed by: INTERNAL MEDICINE

## 2022-12-09 PROCEDURE — B2151ZZ FLUOROSCOPY OF LEFT HEART USING LOW OSMOLAR CONTRAST: ICD-10-PCS | Performed by: INTERNAL MEDICINE

## 2022-12-09 PROCEDURE — 2700000000 HC OXYGEN THERAPY PER DAY

## 2022-12-09 PROCEDURE — 6370000000 HC RX 637 (ALT 250 FOR IP): Performed by: INTERNAL MEDICINE

## 2022-12-09 PROCEDURE — 93458 L HRT ARTERY/VENTRICLE ANGIO: CPT

## 2022-12-09 PROCEDURE — C1894 INTRO/SHEATH, NON-LASER: HCPCS | Performed by: INTERNAL MEDICINE

## 2022-12-09 PROCEDURE — 85610 PROTHROMBIN TIME: CPT

## 2022-12-09 PROCEDURE — 80048 BASIC METABOLIC PNL TOTAL CA: CPT

## 2022-12-09 PROCEDURE — 4A023N7 MEASUREMENT OF CARDIAC SAMPLING AND PRESSURE, LEFT HEART, PERCUTANEOUS APPROACH: ICD-10-PCS | Performed by: INTERNAL MEDICINE

## 2022-12-09 PROCEDURE — 93005 ELECTROCARDIOGRAM TRACING: CPT | Performed by: INTERNAL MEDICINE

## 2022-12-09 PROCEDURE — 94761 N-INVAS EAR/PLS OXIMETRY MLT: CPT

## 2022-12-09 PROCEDURE — 2500000003 HC RX 250 WO HCPCS

## 2022-12-09 PROCEDURE — 93458 L HRT ARTERY/VENTRICLE ANGIO: CPT | Performed by: INTERNAL MEDICINE

## 2022-12-09 PROCEDURE — 6360000002 HC RX W HCPCS

## 2022-12-09 PROCEDURE — 93010 ELECTROCARDIOGRAM REPORT: CPT | Performed by: INTERNAL MEDICINE

## 2022-12-09 PROCEDURE — 80061 LIPID PANEL: CPT

## 2022-12-09 PROCEDURE — 6360000004 HC RX CONTRAST MEDICATION

## 2022-12-09 PROCEDURE — B2111ZZ FLUOROSCOPY OF MULTIPLE CORONARY ARTERIES USING LOW OSMOLAR CONTRAST: ICD-10-PCS | Performed by: INTERNAL MEDICINE

## 2022-12-09 PROCEDURE — 1200000000 HC SEMI PRIVATE

## 2022-12-09 PROCEDURE — 2580000003 HC RX 258: Performed by: INTERNAL MEDICINE

## 2022-12-09 PROCEDURE — 85027 COMPLETE CBC AUTOMATED: CPT

## 2022-12-09 PROCEDURE — C1887 CATHETER, GUIDING: HCPCS | Performed by: INTERNAL MEDICINE

## 2022-12-09 PROCEDURE — C1769 GUIDE WIRE: HCPCS | Performed by: INTERNAL MEDICINE

## 2022-12-09 RX ORDER — ISOSORBIDE MONONITRATE 30 MG/1
30 TABLET, EXTENDED RELEASE ORAL DAILY
Status: DISCONTINUED | OUTPATIENT
Start: 2022-12-09 | End: 2022-12-11 | Stop reason: HOSPADM

## 2022-12-09 RX ORDER — SODIUM CHLORIDE 0.9 % (FLUSH) 0.9 %
5-40 SYRINGE (ML) INJECTION PRN
Status: DISCONTINUED | OUTPATIENT
Start: 2022-12-09 | End: 2022-12-09

## 2022-12-09 RX ORDER — ENOXAPARIN SODIUM 100 MG/ML
60 INJECTION SUBCUTANEOUS 2 TIMES DAILY
Status: DISCONTINUED | OUTPATIENT
Start: 2022-12-11 | End: 2022-12-11 | Stop reason: HOSPADM

## 2022-12-09 RX ORDER — MIDAZOLAM HYDROCHLORIDE 1 MG/ML
INJECTION INTRAMUSCULAR; INTRAVENOUS
Status: COMPLETED | OUTPATIENT
Start: 2022-12-09 | End: 2022-12-09

## 2022-12-09 RX ORDER — ACETAMINOPHEN 325 MG/1
325 TABLET ORAL EVERY 4 HOURS PRN
Status: DISCONTINUED | OUTPATIENT
Start: 2022-12-09 | End: 2022-12-11 | Stop reason: HOSPADM

## 2022-12-09 RX ORDER — METOPROLOL SUCCINATE 25 MG/1
25 TABLET, EXTENDED RELEASE ORAL DAILY
Status: DISCONTINUED | OUTPATIENT
Start: 2022-12-09 | End: 2022-12-11 | Stop reason: HOSPADM

## 2022-12-09 RX ORDER — ASPIRIN 325 MG
325 TABLET ORAL ONCE
Status: DISCONTINUED | OUTPATIENT
Start: 2022-12-09 | End: 2022-12-09

## 2022-12-09 RX ORDER — LORAZEPAM 0.5 MG/1
0.5 TABLET ORAL
Status: DISCONTINUED | OUTPATIENT
Start: 2022-12-09 | End: 2022-12-09

## 2022-12-09 RX ORDER — SODIUM CHLORIDE 9 MG/ML
INJECTION, SOLUTION INTRAVENOUS PRN
Status: DISCONTINUED | OUTPATIENT
Start: 2022-12-09 | End: 2022-12-09

## 2022-12-09 RX ORDER — SODIUM CHLORIDE 9 MG/ML
INJECTION, SOLUTION INTRAVENOUS CONTINUOUS
Status: ACTIVE | OUTPATIENT
Start: 2022-12-09 | End: 2022-12-09

## 2022-12-09 RX ORDER — ASPIRIN 81 MG/1
81 TABLET ORAL DAILY
Status: DISCONTINUED | OUTPATIENT
Start: 2022-12-09 | End: 2022-12-11 | Stop reason: HOSPADM

## 2022-12-09 RX ORDER — SODIUM CHLORIDE 0.9 % (FLUSH) 0.9 %
5-40 SYRINGE (ML) INJECTION EVERY 12 HOURS SCHEDULED
Status: DISCONTINUED | OUTPATIENT
Start: 2022-12-09 | End: 2022-12-11 | Stop reason: HOSPADM

## 2022-12-09 RX ORDER — SODIUM CHLORIDE 9 MG/ML
INJECTION, SOLUTION INTRAVENOUS PRN
Status: DISCONTINUED | OUTPATIENT
Start: 2022-12-09 | End: 2022-12-11 | Stop reason: HOSPADM

## 2022-12-09 RX ORDER — METOPROLOL SUCCINATE 25 MG/1
25 TABLET, EXTENDED RELEASE ORAL DAILY
COMMUNITY

## 2022-12-09 RX ORDER — LISINOPRIL 20 MG/1
20 TABLET ORAL DAILY
Status: DISCONTINUED | OUTPATIENT
Start: 2022-12-09 | End: 2022-12-11 | Stop reason: HOSPADM

## 2022-12-09 RX ORDER — SODIUM CHLORIDE 0.9 % (FLUSH) 0.9 %
5-40 SYRINGE (ML) INJECTION PRN
Status: DISCONTINUED | OUTPATIENT
Start: 2022-12-09 | End: 2022-12-11 | Stop reason: HOSPADM

## 2022-12-09 RX ORDER — SODIUM CHLORIDE 0.9 % (FLUSH) 0.9 %
5-40 SYRINGE (ML) INJECTION EVERY 12 HOURS SCHEDULED
Status: DISCONTINUED | OUTPATIENT
Start: 2022-12-09 | End: 2022-12-09

## 2022-12-09 RX ORDER — ATORVASTATIN CALCIUM 40 MG/1
40 TABLET, FILM COATED ORAL NIGHTLY
Status: DISCONTINUED | OUTPATIENT
Start: 2022-12-09 | End: 2022-12-11 | Stop reason: HOSPADM

## 2022-12-09 RX ORDER — ONDANSETRON 2 MG/ML
4 INJECTION INTRAMUSCULAR; INTRAVENOUS EVERY 6 HOURS PRN
Status: DISCONTINUED | OUTPATIENT
Start: 2022-12-09 | End: 2022-12-09

## 2022-12-09 RX ORDER — FENTANYL CITRATE 50 UG/ML
INJECTION, SOLUTION INTRAMUSCULAR; INTRAVENOUS
Status: COMPLETED | OUTPATIENT
Start: 2022-12-09 | End: 2022-12-09

## 2022-12-09 RX ADMIN — Medication 10 ML: at 21:17

## 2022-12-09 RX ADMIN — ATORVASTATIN CALCIUM 40 MG: 40 TABLET, FILM COATED ORAL at 21:18

## 2022-12-09 RX ADMIN — FENTANYL CITRATE 50 MCG: 50 INJECTION, SOLUTION INTRAMUSCULAR; INTRAVENOUS at 09:02

## 2022-12-09 RX ADMIN — SODIUM CHLORIDE: 9 INJECTION, SOLUTION INTRAVENOUS at 09:46

## 2022-12-09 RX ADMIN — MIDAZOLAM HYDROCHLORIDE 2 MG: 1 INJECTION INTRAMUSCULAR; INTRAVENOUS at 08:35

## 2022-12-09 RX ADMIN — FENTANYL CITRATE 50 MCG: 50 INJECTION, SOLUTION INTRAMUSCULAR; INTRAVENOUS at 08:36

## 2022-12-09 RX ADMIN — ACETAMINOPHEN 325 MG: 325 TABLET ORAL at 14:56

## 2022-12-09 ASSESSMENT — PAIN DESCRIPTION - LOCATION: LOCATION: HEAD

## 2022-12-09 ASSESSMENT — PAIN DESCRIPTION - DESCRIPTORS: DESCRIPTORS: ACHING

## 2022-12-09 ASSESSMENT — PAIN SCALES - GENERAL: PAINLEVEL_OUTOF10: 3

## 2022-12-09 NOTE — PROGRESS NOTES
Case discussed with CT surgery, bedside nurse, and patient. Due to his body surface area, he will not be able to undergo surgical treatment at [facility]. They recommended evaluation at [facility] or [facility]. We will plan for patient to have outpatient consultation with his surgical team of choice. Plan for overnight observation and post procedure protocol. If stable, can be discharged home tomorrow morning.

## 2022-12-09 NOTE — PROCEDURES
[facility][1]       Cardiac Catheterization Lab Report    PATIENT: Jyothi Peñaloza  DATE: 2022  MRN: [id number]  CSN: [id number]  : 1955      Performing Physician: Norah Eugene MD, JESUS, [facility]  Primary Care Physician: Luis Singletary MD  Admitting/Referring provider: Sydnee Hestre MD     Procedures Performed:   1. Left heart catheterization  2. Selective left and right coronary angiogram  3. Left ventriculography     Procedure Findings:  1. Severe multi vessel coronary artery diease   ~complex DIAG, and trifurcating CIRC/OM/RI  2. Normal left ventricular function with EF estimated at 55-60%  3. Elevated left ventricular filling pressures with an LVEDP of 24  4. Dilated left ventricle      Indications:   Patient Active Problem List   Diagnosis    Chest pain    HTN (hypertension)    Morbid obesity with BMI of 50.0-59.9, adult (HCC)    Hx pulmonary embolism    Shortness of breath    Chest discomfort    Diaphoresis    Abnormal cardiovascular stress test    Coronary artery disease involving native coronary artery of native heart without angina pectoris       Details:   Jyothi Peñaloza was brought to the cardiac catheterization lab in a fasting state after informed consent was obtained. If the patient was able to provide written consent, it was obtained. The patient's vitals were monitored through out the procedure. The patient was sedated using the appropriate levels of sedation and ASA guidelines. The appropriate access site area was prepped and drapped in a sterile fashion. The area was anesthetized with 2% lidocaine. Using the modified Seldinger technique, an arterial sheath was introduced into the arterial access site using a single anterior wall puncture. The sheath was flushed and prepped in usual fashion. Catheters used during the procedure included 5 Faroese TIG 4.0 catheter, 5 Western Joan JR4, and 6 Western Joan XB 4.0.  The catheters were advanced and removed over a Lowry Hidden 035\" wire, into the appropriate positions. Multiple angiographic views were obtained. An LV gram was obtained. Findings:    1. Left main coronary artery was large. There is mild tapering atherosclerosis within the ostial and midportion. 2. Left anterior descending artery has modest disease throughout. There is a large first diagonal branch that has 70 to 80% proximal and ostial stenosis. 3. Left circumflex has severe atherosclerotic disease. It was moderate in size. There was a moderate sized obtuse marginal branch. Circumflex coronary artery is a dominant left-sided vessel of the circulation. There is a large trifurcating complex lesion involving the proximal portion of the circumflex. The ramus intermediate and OM1 have 90 to 95% atherosclerosis. There is a critical lesion within the proximal circumflex that appears to be about 70%. 4. Right coronary artery has moderate atherosclerotic disease. It was moderate in size and was the co-dominant artery. 5. Left ventriculogram showed normal LVEF at 55-60%. Wall motion was normal .  Overall left ventricular size appeared to be dilated. Left ventricular end-diastolic pressure was measured at 25. Ht 5' 9\" (1.753 m)   Wt (!) 430 lb 8 oz (195.3 kg)   BMI 63.57 kg/m²     The access site was controlled with manual pressure and/or appropriate closure device. Moderate Conscious Sedation Details: An independent trained observer pushed medications at my direction. We monitoring the patient's level of consciousness and vital signs/physiologic status throughout the procedure. CPT codes 29708 and 91987    Start time: 0836  Stop time: 0910  ASA class: 3    Sedation totals:  Versad - 2mg  Fentanyl - 100mcg    EBL - minimal <5 cc blood loss    The patient was monitored continuously with the ECG, pulse oximetry, blood pressure, and direct observation. CONCLUSIONS:    1.  Severe multi-vessel coronary artery disease with critical disease of the diagonal and a complex trifurcation lesion of the circumflex. ASSESSMENT/RECOMMENDATIONS:    1. Consultation for cardiothoracic surgery. The circumflex trifurcation lesion is not favorable continues revascularization. ~Consideration for bypass to the diagonal, circumflex, ramus intermediate, and obtuse marginal  2. Hold oral anticoagulation  3. Start Lovenox Sunday December 11 -patient will require 0.5 mg/kg twice daily.    ~He did take his oral anticoagulation on Friday, December 9 in the Alesia Hope MD, [place] [address] [facility]  [place] [address]  [phone] [facility] central office  [phone] [facility] office  12/9/2022  9:20 AM

## 2022-12-09 NOTE — PROGRESS NOTES
Discussed case with Dr Suad Wong and Deana Hess as well as our Perfusionists. With his weight his BSA is 2.9. We do not have an oxygenator on bypass that can support that. Cannot offer surgery. Recommend evaluation with [facility] or [facility].

## 2022-12-09 NOTE — PRE SEDATION
Brief Pre-Op Note/Sedation Assessment      Edy Panda  1955  [id number]  7:50 AM    Planned Procedure: Cardiac Catheterization Procedure  Post Procedure Plan: Return to same level of care  Consent: I have discussed with the patient and/or the patient representative the indication, alternatives, and the possible risks and/or complications of the planned procedure and the anesthesia methods. The patient and/or patient representative appear to understand and agree to proceed. Chief Complaint:   Chest Pain/Pressure      Indications for Cath Procedure:  Presentation:  New Onset Angina <= 2 months  2. Anginal Classification within 2 weeks:  CCS III - Symptoms with everyday living activities, i.e., moderate limitation  3. Angina Symptoms Assessment:  Typical Chest Pain  4. Heart Failure Class within last 2 weeks:  Yes:  Heart Failure Type: Diastolic Severity:  Class III - Symptoms of HF on less-than-ordinary exertion  5. Cardiovascular Instability:  No    Prior Ischemic Workup/Eval:  Pre-Procedural Medications: Yes: ACE/ARB/ARNI, Aspirin, Beta Blockers, Long Acting Nitrates (Any), and STATIN  2. Stress Test Completed? Yes:  Stress or Imaging Studies Performed (within ANY time period):   Type:  Stress Nuclear  Results:  Positive:  Myocardial Perfusion Defects (Nuclear) Extent of Ischemia:  Intermediate    Does Patient need surgery?   Cath Valve Surgery:  No    Pre-Procedure Medical History:  Vital Signs:  Ht 5' 9\" (1.753 m)   Wt (!) 430 lb 8 oz (195.3 kg)   BMI 63.57 kg/m²     Allergies:  No Known Allergies  Medications:    Current Facility-Administered Medications   Medication Dose Route Frequency Provider Last Rate Last Admin    sodium chloride flush 0.9 % injection 5-40 mL  5-40 mL IntraVENous 2 times per day Darío Blanchard MD        sodium chloride flush 0.9 % injection 5-40 mL  5-40 mL IntraVENous PRN Darío Blanchard MD        0.9 % sodium chloride infusion   IntraVENous PRSHIV Schafer Nereyda Mariee MD        aspirin tablet 325 mg  325 mg Oral Once Vinicius Majano MD        ondansetron [facility]) injection 4 mg  4 mg IntraVENous Q6H PRN Vinicius Majnao MD        LORazepam (ATIVAN) tablet 0.5 mg  0.5 mg Oral Once PRN Vinicius Majano MD           Past Medical History:    Past Medical History:   Diagnosis Date    H/O gastric bypass 07/2014    Hypertension     Obesity     Panic attacks     PE (pulmonary embolism) 2010       Surgical History:    Past Surgical History:   Procedure Laterality Date    CHOLECYSTECTOMY  2009    GASTRIC BYPASS SURGERY  2014    HERNIA REPAIR  03/35/7078    umbilical hernia             Pre-Sedation:  Pre-Sedation Documentation and Exam:  I have personally completed a history, physical exam & review of systems for this patient (see notes). Prior History of Anesthesia Complications:   none    Modified Mallampati:  II (soft palate, uvula, fauces visible)    ASA Classification:  Class 3 - A patient with severe systemic disease that limits activity but is not incapacitating    Jeff Scale: Activity:  2 - Able to move 4 extremities voluntarily on command  Respiration:  2 - Able to breathe deeply and cough freely  Circulation:  2 - BP+/- 20mmHg of normal  Consciousness:  2 - Fully awake  Oxygen Saturation (color):  2 - Able to maintain oxygen saturation >92% on room air    Sedation/Anesthesia Plan:  Guard the patient's safety and welfare. Minimize physical discomfort and pain. Minimize negative psychological responses to treatment by providing sedation and analgesia and maximize the potential amnesia. Patient to meet pre-procedure discharge plan.     Medication Planned:  midazolam intravenously and fentanyl intravenously    Patient is an appropriate candidate for plan of sedation:   yes      Electronically signed by Khushbu Ellis MD on 12/9/2022 at 7:50 AM

## 2022-12-09 NOTE — H&P
[address] [facility]   Cardiovascular Evaluation    PATIENT: Ash So  DATE: 2022  MRN: [id number]  CSN: [id number]  : 1955    Primary Care Doctor: Julian Metcalf MD    Reason for evaluation:   Follow-up (Abnormal stress test/Pt states that he was seen in the ED recently he tested Positive for Flu A), Hypertension, Coronary Artery Disease, and Shortness of Breath      Subjective:    History of present illness on initial date of evaluation:   Ash So is a 69 y.o. patient with a past medical history of coronary artery disease, hypertension, congestive heart failure and hyperlipidemia. EKG in 3/2017 shows NSR. Left heart cath in 3/2017 no need for intervention. Echo and stress test on 10/2021 were both unremarkable. At last office visit 12/3/2021 he reported he experienced a syncopal episode while with his wife at a restaurant. Prior to the episode he felt very dizzy. He was evaluated at [facility] and was found to have very low blood pressure likely due to dehydration per basic metabolic panel. Since last office visit patient has been undergoing testing with PCP for SOB. He had Stress test 10/27/22 at [facility] which showed possible area of mild ischemia and was advised to follow up with cardiologist.   Today patient is here today due to worsening SOB particular on exertion. He reports he recently tested positive for Flu A. Patient is taking all cardiac medications as prescribed and tolerates them well. Patient denies current edema, chest pain, palpitations, dizziness or syncope.        Patient Active Problem List   Diagnosis    Chest pain    HTN (hypertension)    Morbid obesity with BMI of 50.0-59.9, adult (HCC)    Hx pulmonary embolism    Shortness of breath    Chest discomfort    Diaphoresis    Abnormal cardiovascular stress test    Coronary artery disease involving native coronary artery of native heart without angina pectoris       Past Medical History:   has a past medical history of H/O gastric bypass, Hypertension, Obesity, Panic attacks, and PE (pulmonary embolism). Surgical History:   has a past surgical history that includes Cholecystectomy (2009); Gastric bypass surgery (2014); and hernia repair (08/01/2017). Social History:   reports that he has never smoked. He has never used smokeless tobacco. He reports current alcohol use. He reports that he does not use drugs. Family History:  No evidence for sudden cardiac death or premature CAD    Medications:  Reviewed and are listed in nursing record. and/or listed below  Outpatient Medications:  Prior to Admission medications    Medication Sig Start Date End Date Taking? Authorizing Provider   vitamin D (ERGOCALCIFEROL) 1.25 MG (41809 UT) CAPS capsule Take 1 capsule by mouth in the morning and at bedtime 10/5/22  Yes Historical Provider, MD   isosorbide mononitrate (IMDUR) 30 MG extended release tablet TAKE 1 TABLET BY MOUTH EVERY DAY 2/8/22  Yes Sung Fang MD   rivaroxaban (XARELTO) 10 MG TABS tablet Take 10 mg by mouth   Yes Historical Provider, MD   lisinopril (PRINIVIL;ZESTRIL) 20 MG tablet TAKE 1 TABLET BY MOUTH DAILY 10/21/21  Yes Historical Provider, MD   aspirin 81 MG tablet Take 81 mg by mouth daily. Yes Historical Provider, MD   furosemide (LASIX) 40 MG tablet Take one tablet daily  Patient not taking: Reported on 11/11/2022 12/3/21   Zena Live MD   potassium chloride (KLOR-CON M) 10 MEQ extended release tablet Take 1 tablet by mouth 2 times daily  Patient not taking: Reported on 11/11/2022 11/11/20   Zena Live MD   albuterol sulfate HFA (PROVENTIL HFA) 108 (90 Base) MCG/ACT inhaler Inhale 2 puffs into the lungs every 4 hours as needed for Wheezing  Patient not taking: Reported on 11/11/2020 6/16/20 6/16/21  Briana Higuera DO   ALPRAZolam Sam Catherine) 0.5 MG tablet Take 0.5 mg by mouth. .  Patient not taking: No sig reported    Historical Provider, MD atorvastatin (LIPITOR) 10 MG tablet TAKE 1 TABLET BY MOUTH EVERY EVENING  Patient not taking: Reported on 11/11/2022 8/17/18   Berny Mello MD   Multiple Vitamins-Minerals (CENTRUM SILVER PO) Take 1 tablet by mouth daily  Patient not taking: Reported on 11/11/2022    Historical Provider, MD   Calcium Citrate (CITRACAL PO) Take 1 tablet by mouth daily  Patient not taking: Reported on 11/11/2022    Historical Provider, MD   nitroGLYCERIN (NITROSTAT) 0.4 MG SL tablet up to max of 3 total doses. If no relief after 1 dose, call 911. Patient not taking: Reported on 12/3/2021 3/17/17   Abilio Villa MD   warfarin (COUMADIN) 5 MG tablet 10 mg on Sun Tuesday Thursday and Saturday. 7.5 mg Monday Wednesday and Friday  Patient not taking: No sig reported 3/17/17   Abilio Villa MD       In-patient schedule medications:    Infusion Medications: Allergies:  Patient has no known allergies. Review of Systems:   All 12 point review of symptoms completed. Pertinent positives identified in the HPI, all other review of symptoms negative as below. Physical Examination:    Vitals:    11/11/22 1343   BP: (!) 138/90   Pulse: 86   SpO2: 95%      Weight: (!) 423 lb (191.9 kg)     Wt Readings from Last 3 Encounters:   11/11/22 (!) 423 lb (191.9 kg)   12/03/21 (!) 413 lb (187.3 kg)   11/11/20 (!) 408 lb (185.1 kg)     No intake or output data in the 24 hours ending 11/11/22 1420    Due to the current efforts to prevent transmission of FLU and also the need to preserve PPE for other caregivers, an observational only full face-to-face encounter with the patient was performed. That being said, all relevant records and diagnostic tests were reviewed, including laboratory results and imaging. Please reference any relevant documentation elsewhere.  Care will be coordinated with the primary service    General Appearance:  Alert, cooperative, no distress, appears stated age   Head:  Normocephalic, without obvious abnormality, atraumatic   Eyes:  PERRL, conjunctiva/corneas clear       Nose: Nares normal, no drainage or sinus tenderness   Throat: Lips, mucosa, and tongue normal   Neck: Supple, symmetrical, trachea midline, no adenopathy, thyroid: not enlarged, symmetric, no JVD       Lungs:   Respirations unlabored and no distress   Chest Wall:  deferred   Heart:  Regular rhythm and normal rate   Abdomen:   Not distended but morbidly obese           Extremities: Extremities normal, atraumatic, no cyanosis. + edema   Pulses: deferred   Skin: Skin color, texture, turgor normal, no rashes or lesions   Pysch: Normal mood and affect   Neurologic: Observational exam with normal gross motor and sensory exam.         Labs        Cardiac Testing: I have reviewed the findings below. Stress Test (Farideh) at [facility]: 10/27/2022  Interpetation Summary:   1. No chest pain after regadenoson injection. 2. No evidence of stress-induced ischemia on EKG. 3. Possible area of mild ischemia in the territory subtended by the apical   LAD. No high risk features. 4. Normal LV systolic function. ECHO 10/2021 [facility]:  Summary:   The left ventricular function is normal.   Overall left ventricular ejection fraction is estimated to be 60-65%. The left ventricular wall motion is normal.   Right ventricular systolic pressure is mildly elevated at 35-45 mmHg. 4.2 cm aortic root aneurysm     STRESS TEST 10/2021 [facility]:  No reversible perfusion defect to suggest myocardial ischemia. Fixed perfusion defect in the lateral wall of the left ventricle, suggestive of myocardial infarct in the expected territory of the left circumflex and LAD    ECHO 9/2020:  Summary   Technically difficult examination due to body habitus. Definity® used for   myocardial border enhancement. Normal left ventricle size, wall thickness, and systolic function with a   visually estimated ejection fraction of 55%. No regional wall motion abnormalities are seen.    Normal left ventricular diastolic filling pressure. The right ventricle is not well visualized but appears mildly dilated in   size. Right ventricular systolic function is normal.   The right atrium is mildly enlarged. The aortic root is mildly dilated at 4.0 cm. The ascending aorta is moderately dilated at 4.4 cm. The IVC is dilated in size (>2.1 cm) but collapses >50% with respiration   consistent with elevated right atrial pressures (8 mmHg) . CATH: Left Heart Cath 3/2017  1. Moderate Coronary disease              ~70% osital OM1 - not favorable for PCI  2. Normal hemodynamics    EKG 3/2017: NSR    BYPASS:  VASCULAR 2/2013:  OVERALL IMPRESSION:                                               1.  No hemodynamically _____ are identified in the internal       common carotid arteries bilaterally. 2.  Antegrade flow in vertebral arteries bilaterally. Assessment:  69 y.o. patient with:   Diagnosis Orders   1. Abnormal cardiovascular stress test        2. Shortness of breath  Left heart cath      3. Coronary artery disease involving native coronary artery of native heart without angina pectoris  Left heart cath      4. Morbid obesity with BMI of 50.0-59.9, adult ([facility] [address]            Plan:  When you have recovered from flu undergo Angiogram.   Order left heart catheterization (angiogram) ~   ~ NPO (nothing to eat or drink) after midnight  ~ Hold Lasix day of procedure  ~ Take all other prescribed medications morning of procedure with sip of water.  ~ Pack over night bag  ~ No lotion or cream to skin day of procedure. Weight loss discussed. 3. Keep your appointment in December with me 12/13/2022 @ 2:15pm    Scribe's attestation: This note was scribed in the presence of Ronald Jimenez by Jeremiah Tai RN      I, Dr. Teofilo Whitt, personally performed the services described in this documentation, as scribed by the above signed scribe in my presence.  It is both accurate and complete to my knowledge. I agree with the details independently gathered by the clinical support staff, while the remaining scribed note accurately describes my personal service to the patient. Medical Decision Making: The following items were considered in medical decision making:  Independent review of images  Review / order clinical lab tests  Review / order radiology tests  Decision to obtain old records  Review and summation of old records as accessed through [employer] (a summary of my findings in these old records)      Time Based Itemization  A total of 40 minutes was spent on today's patient encounter.   If applicable, non-patient-facing activities:  (X)Preparing to see the patient and reviewing records  (X) Individual interpretation of results  ( ) Discussion or coordination of care with other health care professionals  () Ordering of unique tests, medications, or procedures  (X) Documentation within the R Elan Mendoza MD, [address], [facility], [address] [facility] office  [phone] [facility]  11/11/2022  2:20 PM

## 2022-12-09 NOTE — POST SEDATION
Patient:  Ash So   :   1955    A pre-sedation re-evaluation was performed immediately at the end of the procedure. Procedure:  Cardiac cath  Medications: Procedural sedation with minimal conscious sedation  Complications: None  Estimated Blood Loss: none  Specimens: Were not obtained        Swapna Medication and Procedural Reconciliation:  I agree that the documented medications and procedures performed are true. The medications were given under my order. The procedures were performed under my direct supervision.

## 2022-12-10 PROCEDURE — 2700000000 HC OXYGEN THERAPY PER DAY

## 2022-12-10 PROCEDURE — 6370000000 HC RX 637 (ALT 250 FOR IP): Performed by: INTERNAL MEDICINE

## 2022-12-10 PROCEDURE — 2580000003 HC RX 258: Performed by: INTERNAL MEDICINE

## 2022-12-10 PROCEDURE — 1200000000 HC SEMI PRIVATE

## 2022-12-10 PROCEDURE — 94761 N-INVAS EAR/PLS OXIMETRY MLT: CPT

## 2022-12-10 PROCEDURE — 99233 SBSQ HOSP IP/OBS HIGH 50: CPT | Performed by: NURSE PRACTITIONER

## 2022-12-10 RX ORDER — ENOXAPARIN SODIUM 100 MG/ML
60 INJECTION SUBCUTANEOUS 2 TIMES DAILY
Qty: 30 EACH | Refills: 0 | Status: SHIPPED
Start: 2022-12-11

## 2022-12-10 RX ORDER — ATORVASTATIN CALCIUM 40 MG/1
40 TABLET, FILM COATED ORAL NIGHTLY
Qty: 30 TABLET | Refills: 3 | Status: SHIPPED
Start: 2022-12-10

## 2022-12-10 RX ORDER — LISINOPRIL 20 MG/1
40 TABLET ORAL DAILY
Qty: 30 TABLET | Refills: 3 | Status: SHIPPED
Start: 2022-12-10

## 2022-12-10 RX ADMIN — ACETAMINOPHEN 325 MG: 325 TABLET ORAL at 04:41

## 2022-12-10 RX ADMIN — ATORVASTATIN CALCIUM 40 MG: 40 TABLET, FILM COATED ORAL at 20:24

## 2022-12-10 RX ADMIN — ISOSORBIDE MONONITRATE 30 MG: 30 TABLET, EXTENDED RELEASE ORAL at 09:30

## 2022-12-10 RX ADMIN — Medication 10 ML: at 09:30

## 2022-12-10 RX ADMIN — ACETAMINOPHEN 350 MG: 325 TABLET ORAL at 20:26

## 2022-12-10 RX ADMIN — METOPROLOL SUCCINATE 25 MG: 25 TABLET, EXTENDED RELEASE ORAL at 09:30

## 2022-12-10 RX ADMIN — LISINOPRIL 20 MG: 20 TABLET ORAL at 09:30

## 2022-12-10 RX ADMIN — ASPIRIN 81 MG: 81 TABLET, COATED ORAL at 09:30

## 2022-12-10 ASSESSMENT — PAIN DESCRIPTION - LOCATION: LOCATION: KNEE;HEAD

## 2022-12-10 ASSESSMENT — PAIN SCALES - GENERAL
PAINLEVEL_OUTOF10: 0
PAINLEVEL_OUTOF10: 0
PAINLEVEL_OUTOF10: 3
PAINLEVEL_OUTOF10: 0

## 2022-12-10 NOTE — PROGRESS NOTES
Spoke with patient son-in-law Tiffanie Escobedo and wife Lazaro Scruggs on the phone to notify that  has accepted patient and a bed will be available later today.

## 2022-12-10 NOTE — PROGRESS NOTES
Received call from transfer center notifying that a bed at  is not available at this time, patient and patient wife made aware.

## 2022-12-10 NOTE — DISCHARGE SUMMARY
Addendum: Patient did not discharge 12/10/2022 due to lack of beds at [facility]. This note will serve as progress note. [facility]  Discharge Summary  Patient ID:  Reena Julien  [id number] y.o. 1955    Admit date: 12/9/2022    Discharge date: 12/10/2022    Admitting Physician: Mary Sahu MD     Discharge Provider: Addie Coley [address] [place] [address] Garcia Hobbs Course: Reena Julien presented 12/9/2022 as an outpatient for coronary angiography following an abnormal stress test and KIM. LHC showed severe CAD. CT surgery recommended evaluation at [facility] or [facility] due to patient size. Rhythm overnight has been sinus. This morning patient feels anxious and has significant shortness of breath with minimal exertion and associated chest pain. Assessment:  CAD: severe on angiogram 12/9/2022   - CT surgery consulted but unable to accommodate at [facility] due to size  HTN: uncontrolled  HLD  Morbid obesity  History of PE    Plan:  1. He was found to have severe CAD and referred to CT surgery. However, unable to perform CABG here due to patient size, referred to  or [facility]. With ongoing significant SOB and intermittent chest pain, will initiate transfer to  today for CT surgery evaluation. I discussed this with Dr. Jovanna Watts and [facility] team.  2. Holding home xarelto while surgical plan pending; plan was to start lovenox Sunday AM 12/11/2022  3. Continue aspirin, statin, toprol, lisinopril (increased today)  4. Trasnfer to [facility] today    More than 65 minutes of time was spent in direct patient and chart contact during this visit, more than 50% of which was spent educating regarding CAD, procedural details and lifestyle modifications along with physician collaboration.     Admission Diagnoses: Abnormal result of other cardiovascular function study [R94.39]  CAD in native artery [I25.10]    Discharge Diagnoses:   Patient Active Problem List   Diagnosis    Chest pain    HTN (hypertension)    Morbid obesity with BMI of 50. 0-59.9, adult [facility])    Hx pulmonary embolism    Shortness of breath    Chest discomfort    Diaphoresis    Abnormal cardiovascular stress test    Coronary artery disease involving native coronary artery of native heart without angina pectoris    CAD in native artery      Discharged Condition: serious  The patient was seen and examined on day of discharge and this discharge summary is in conjunction with any daily progress note from day of discharge. Consults:  None  Physical Exam:  BP (!) 157/85   Pulse 66   Temp 98.4 °F (36.9 °C) (Oral)   Resp 18   Ht 5' 9\" (1.753 m)   Wt (!) 430 lb 8 oz (195.3 kg)   SpO2 97%   BMI 63.57 kg/m²     Intake/Output Summary (Last 24 hours) at 12/10/2022 1251  Last data filed at 12/10/2022 1017  Gross per 24 hour   Intake 860 ml   Output --   Net 860 ml     General:  Awake, alert, NAD, obese  Skin:  Warm and dry  Neck:  JVD<8, no bruit  Chest:  Clear to auscultation, no wheezes/rhonchi/rales  Cardiovascular:  RRR S1S2  Abdomen:  Soft, nontender, +bowel sounds, obese  Extremities: +edema  Right radial site soft, no hematoma, 2+ pulse    Significant Diagnostic Studies:     Echo 9/2022:  Left ventricle is normal in size. Normal left ventricular wall thickness. Left ventricular systolic function is normal. (LVEF>/=55%)   Overall left ventricular ejection fraction is estimated to be 55-60%. Normal Diastolic Function. No significant valvular abnormalities identified. Coronary angiogram 12/9/2022:  1. Left heart catheterization  2. Selective left and right coronary angiogram  3. Left ventriculography   Procedure Findings:  1. Severe multi vessel coronary artery diease              ~complex DIAG, and trifurcating CIRC/OM/RI  2. Normal left ventricular function with EF estimated at 55-60%  3. Elevated left ventricular filling pressures with an LVEDP of 24  4.   Dilated left ventricle  Details:              Edy Panda was brought to the cardiac catheterization lab in a fasting state after informed consent was obtained. If the patient was able to provide written consent, it was obtained. The patient's vitals were monitored through out the procedure. The patient was sedated using the appropriate levels of sedation and ASA guidelines. The appropriate access site area was prepped and drapped in a sterile fashion. The area was anesthetized with 2% lidocaine. Using the modified Seldinger technique, an arterial sheath was introduced into the arterial access site using a single anterior wall puncture. The sheath was flushed and prepped in usual fashion. Catheters used during the procedure included 5 Qatari TIG 4.0 catheter, 5 Western Joan JR4, and 6 Western Joan XB 4.0. The catheters were advanced and removed over a .035\" wire, into the appropriate positions. Multiple angiographic views were obtained. An LV gram was obtained. Findings:  1. Left main coronary artery was large. There is mild tapering atherosclerosis within the ostial and midportion. 2. Left anterior descending artery has modest disease throughout. There is a large first diagonal branch that has 70 to 80% proximal and ostial stenosis. 3. Left circumflex has severe atherosclerotic disease. It was moderate in size. There was a moderate sized obtuse marginal branch. Circumflex coronary artery is a dominant left-sided vessel of the circulation. There is a large trifurcating complex lesion involving the proximal portion of the circumflex. The ramus intermediate and OM1 have 90 to 95% atherosclerosis. There is a critical lesion within the proximal circumflex that appears to be about 70%. 4. Right coronary artery has moderate atherosclerotic disease. It was moderate in size and was the co-dominant artery. 5. Left ventriculogram showed normal LVEF at 55-60%. Wall motion was normal .  Overall left ventricular size appeared to be dilated.   Left ventricular end-diastolic pressure was measured at 25.  CONCLUSIONS:  1. Severe multi-vessel coronary artery disease with critical disease of the diagonal and a complex trifurcation lesion of the circumflex. ASSESSMENT/RECOMMENDATIONS:  1. Consultation for cardiothoracic surgery. The circumflex trifurcation lesion is not favorable continues revascularization. ~Consideration for bypass to the diagonal, circumflex, ramus intermediate, and obtuse marginal  2. Hold oral anticoagulation  3. Start Lovenox Sunday December 11 -patient will require 0.5 mg/kg twice daily. ~He did take his oral anticoagulation on Friday, December 9 in the AM.    Labs:   Lab Results   Component Value Date    CREATININE 1.0 12/09/2022    BUN 22 (H) 12/09/2022     12/09/2022    K 4.2 12/09/2022     12/09/2022    CO2 30 12/09/2022      Lab Results   Component Value Date    WBC 5.9 12/09/2022    HGB 8.3 (L) 12/09/2022    HCT 27.7 (L) 12/09/2022    MCV 72.7 (L) 12/09/2022     12/09/2022      Lab Results   Component Value Date    INR 1.45 (H) 12/09/2022    PROTIME 17.6 (H) 12/09/2022      Lab Results   Component Value Date    BNP 16.5 02/06/2013     Disposition: Critical access hospital[facility] for CT surgery    Patient Instructions:      Medication List        START taking these medications      enoxaparin 60 MG/0.6ML  Commonly known as: LOVENOX  Inject 0.6 mLs into the skin 2 times daily  Start taking on: December 11, 2022     nitroGLYCERIN 0.4 MG SL tablet  Commonly known as: NITROSTAT  up to max of 3 total doses. If no relief after 1 dose, call 911. CHANGE how you take these medications      atorvastatin 40 MG tablet  Commonly known as: LIPITOR  Take 1 tablet by mouth nightly  What changed:   medication strength  See the new instructions. lisinopril 20 MG tablet  Commonly known as: PRINIVIL;ZESTRIL  Take 2 tablets by mouth daily  What changed: See the new instructions.             CONTINUE taking these medications      albuterol sulfate  (90 Base) MCG/ACT inhaler  Commonly known as: Proventil HFA  Inhale 2 puffs into the lungs every 4 hours as needed for Wheezing     ALPRAZolam 0.5 MG tablet  Commonly known as: XANAX     aspirin 81 MG tablet     CENTRUM SILVER PO     CITRACAL PO     furosemide 40 MG tablet  Commonly known as: LASIX  Take one tablet daily     isosorbide mononitrate 30 MG extended release tablet  Commonly known as: IMDUR  TAKE 1 TABLET BY MOUTH EVERY DAY     metoprolol succinate 25 MG extended release tablet  Commonly known as: TOPROL XL     potassium chloride 10 MEQ extended release tablet  Commonly known as: KLOR-CON M  Take 1 tablet by mouth 2 times daily            STOP taking these medications      vitamin D 1.25 MG (96450 UT) Caps capsule  Commonly known as: ERGOCALCIFEROL     warfarin 5 MG tablet  Commonly known as: COUMADIN     Xarelto 10 MG Tabs tablet  Generic drug: rivaroxaban               Where to Get Your Medications        Information about where to get these medications is not yet available    Ask your nurse or doctor about these medications  atorvastatin 40 MG tablet  enoxaparin 60 MG/0.6ML  lisinopril 20 MG tablet       Activity: to be determined by [facility] team  Diet: cardiac diet  Wound Care: keep wound clean and dry    Follow-up to be determined after [facility] discharge    Signed:  JOHN Herrera CNP, 12/10/2022, 12:51 PM

## 2022-12-10 NOTE — PROGRESS NOTES
Evaluated patient this morning. He reports significant dyspnea with minimal exertion and intermittent chest pain. Will initiate transfer to [facility] for CABG evaluation.

## 2022-12-11 VITALS
DIASTOLIC BLOOD PRESSURE: 72 MMHG | HEIGHT: 69 IN | WEIGHT: 315 LBS | OXYGEN SATURATION: 100 % | TEMPERATURE: 98.1 F | BODY MASS INDEX: 46.65 KG/M2 | SYSTOLIC BLOOD PRESSURE: 154 MMHG | RESPIRATION RATE: 18 BRPM | HEART RATE: 73 BPM

## 2022-12-11 LAB
ANION GAP SERPL CALCULATED.3IONS-SCNC: 5 MMOL/L (ref 3–16)
BASOPHILS ABSOLUTE: 0 K/UL (ref 0–0.2)
BASOPHILS RELATIVE PERCENT: 0.5 %
BUN BLDV-MCNC: 20 MG/DL (ref 7–20)
CALCIUM SERPL-MCNC: 8.6 MG/DL (ref 8.3–10.6)
CHLORIDE BLD-SCNC: 99 MMOL/L (ref 99–110)
CO2: 32 MMOL/L (ref 21–32)
CREAT SERPL-MCNC: 0.9 MG/DL (ref 0.8–1.3)
EOSINOPHILS ABSOLUTE: 0.2 K/UL (ref 0–0.6)
EOSINOPHILS RELATIVE PERCENT: 3.8 %
GFR SERPL CREATININE-BSD FRML MDRD: >60 ML/MIN/{1.73_M2}
GLUCOSE BLD-MCNC: 166 MG/DL (ref 70–99)
HCT VFR BLD CALC: 29.2 % (ref 40.5–52.5)
HEMOGLOBIN: 9 G/DL (ref 13.5–17.5)
LYMPHOCYTES ABSOLUTE: 0.8 K/UL (ref 1–5.1)
LYMPHOCYTES RELATIVE PERCENT: 14.4 %
MCH RBC QN AUTO: 22 PG (ref 26–34)
MCHC RBC AUTO-ENTMCNC: 30.7 G/DL (ref 31–36)
MCV RBC AUTO: 71.5 FL (ref 80–100)
MONOCYTES ABSOLUTE: 0.4 K/UL (ref 0–1.3)
MONOCYTES RELATIVE PERCENT: 6.7 %
NEUTROPHILS ABSOLUTE: 4.3 K/UL (ref 1.7–7.7)
NEUTROPHILS RELATIVE PERCENT: 74.6 %
PDW BLD-RTO: 16.9 % (ref 12.4–15.4)
PLATELET # BLD: 215 K/UL (ref 135–450)
PMV BLD AUTO: 6.4 FL (ref 5–10.5)
POTASSIUM SERPL-SCNC: 5 MMOL/L (ref 3.5–5.1)
RBC # BLD: 4.08 M/UL (ref 4.2–5.9)
SODIUM BLD-SCNC: 136 MMOL/L (ref 136–145)
WBC # BLD: 5.8 K/UL (ref 4–11)

## 2022-12-11 PROCEDURE — 94761 N-INVAS EAR/PLS OXIMETRY MLT: CPT

## 2022-12-11 PROCEDURE — 2700000000 HC OXYGEN THERAPY PER DAY

## 2022-12-11 PROCEDURE — 2580000003 HC RX 258: Performed by: INTERNAL MEDICINE

## 2022-12-11 PROCEDURE — 85025 COMPLETE CBC W/AUTO DIFF WBC: CPT

## 2022-12-11 PROCEDURE — 36415 COLL VENOUS BLD VENIPUNCTURE: CPT

## 2022-12-11 PROCEDURE — 6360000002 HC RX W HCPCS: Performed by: INTERNAL MEDICINE

## 2022-12-11 PROCEDURE — 6370000000 HC RX 637 (ALT 250 FOR IP): Performed by: INTERNAL MEDICINE

## 2022-12-11 PROCEDURE — 99238 HOSP IP/OBS DSCHRG MGMT 30/<: CPT | Performed by: NURSE PRACTITIONER

## 2022-12-11 PROCEDURE — 80048 BASIC METABOLIC PNL TOTAL CA: CPT

## 2022-12-11 RX ADMIN — ACETAMINOPHEN 325 MG: 325 TABLET ORAL at 08:22

## 2022-12-11 RX ADMIN — LISINOPRIL 20 MG: 20 TABLET ORAL at 08:15

## 2022-12-11 RX ADMIN — ISOSORBIDE MONONITRATE 30 MG: 30 TABLET, EXTENDED RELEASE ORAL at 08:15

## 2022-12-11 RX ADMIN — Medication 10 ML: at 08:16

## 2022-12-11 RX ADMIN — METOPROLOL SUCCINATE 25 MG: 25 TABLET, EXTENDED RELEASE ORAL at 08:15

## 2022-12-11 RX ADMIN — ASPIRIN 81 MG: 81 TABLET, COATED ORAL at 08:15

## 2022-12-11 RX ADMIN — ENOXAPARIN SODIUM 60 MG: 100 INJECTION SUBCUTANEOUS at 08:15

## 2022-12-11 ASSESSMENT — PAIN SCALES - GENERAL
PAINLEVEL_OUTOF10: 4
PAINLEVEL_OUTOF10: 0
PAINLEVEL_OUTOF10: 0

## 2022-12-11 NOTE — FLOWSHEET NOTE
12/10/22 2002   Assessment   Charting Type Shift assessment   Psychosocial   Psychosocial (WDL) X   Patient Behaviors Anxious   Neurological   Neuro (WDL) WDL   [place] Coma Scale   Eye Opening 4   Best Verbal Response 5   Best Motor Response 6   [place] Coma Scale Score 15   HEENT (Head, Ears, Eyes, Nose, & Throat)   HEENT (WDL) X   Right Eye Glasses   Left Eye Glasses   Respiratory   Respiratory (WDL) X   Respiratory Pattern Regular; Tachypneic  (tachy w/exertion)   Respiratory Depth Normal   Respiratory Quality/Effort Dyspnea with exertion   Chest Assessment Chest expansion symmetrical;Trachea midline   L Breath Sounds Diminished   R Breath Sounds Diminished   Level of Activity/Mobility 0   Subcutaneous Air/Crepitus None   Cardiac   Cardiac (WDL) X  (MV CAD)   Cardiac Regularity Regular   Heart Sounds S1, S2   Cardiac Rhythm Sinus rhythm   Cardiac Symptoms Peripheral edema   Rhythm Interpretation   Heart Rate 89   Gastrointestinal   Abdominal (WDL) X   Abdomen Inspection Obese   Genitourinary   Genitourinary (WDL) WDL   Peripheral Vascular   Peripheral Vascular (WDL) WDL   Edema Right lower extremity; Left lower extremity   RLE Edema +2   LLE Edema +2   Puncture Site Assessment 1   Location Radial - right   Site Assessment No redness, drainage, swelling or hematoma   Hemostasis Intervention Discontinued   Dressing Applied Transparent occlusive dressing   Multiple puncture sites No   Skin Integumentary    Skin Integumentary (WDL) WDL   Musculoskeletal   Musculoskeletal (WDL) X   RL Extremity Swelling   LL Extremity Swelling

## 2022-12-11 NOTE — DISCHARGE INSTR - COC
Continuity of Care Form    Patient Name: Valeri Yoo   :  1955  MRN:  [id number]    Admit date:  2022  Discharge date:  22    Code Status Order: Prior   Advance Directives:     Admitting Physician:  Karma Crocker MD  PCP: Kalani Castellanos MD    Discharging Nurse: Jacklyn Duomnt [facility] Unit/Room#: 0213/0213-01  Discharging Unit Phone Number: [phone]    Emergency Contact:   Extended Emergency Contact Information  Primary Emergency Contact: Whitney Parnelllyn  Address: [address] Phone: [phone]  Relation: Spouse  Secondary Emergency Contact: Jeremiah Rios  Mobile Phone: [phone]  Relation: Son-in-Law    Past Surgical History:  Past Surgical History:   Procedure Laterality Date    CHOLECYSTECTOMY      GASTRIC BYPASS SURGERY      HERNIA REPAIR      umbilical hernia       Immunization History:   Immunization History   Administered Date(s) Administered    COVID-19, PFIZER PURPLE top, DILUTE for use, (age 15 y+), 30mcg/0.3mL 02/10/2021, 2021, 2021    Influenza Vaccine, unspecified formulation 10/16/2016    Pneumococcal Polysaccharide (Louecgcfi64) 10/16/2016    Tdap (Boostrix, Adacel) 2013       Active Problems:  Patient Active Problem List   Diagnosis Code    Chest pain R07.9    HTN (hypertension) I10    Morbid obesity with BMI of 50.0-59.9, adult [facility] E66.01, Z68.43    Hx pulmonary embolism Z86.711    Shortness of breath R06.02    Chest discomfort R07.89    Diaphoresis R61    Abnormal cardiovascular stress test R94.39    Coronary artery disease involving native coronary artery of native heart without angina pectoris I25.10    CAD in native artery I25.10       Isolation/Infection:   Isolation            No Isolation          Patient Infection Status       None to display            Nurse Assessment:  Last Vital Signs: BP (!) 154/72   Pulse 73   Temp 98.1 °F (36.7 °C)   Resp 18   Ht 5' 9\" (1.753 m)   Wt (!) 430 lb 8 oz (195.3 kg)   SpO2 100%   BMI 63.57 kg/m²     Last documented pain score (0-10 scale): Pain Level: 0  Last Weight:   Wt Readings from Last 1 Encounters:   12/09/22 (!) 430 lb 8 oz (195.3 kg)     Mental Status:  oriented and alert    IV Access:  - Peripheral IV - site  Left hand, insertion date: 12/9/22    Nursing Mobility/ADLs:  Walking   Independent  Transfer  Independent  Bathing  Assisted  Dressing  Assisted  Toileting  Independent  Feeding  [address]  Independent  Med Delivery   whole    Wound Care Documentation and Therapy:        Elimination:  Continence: Bowel: Yes  Bladder: Yes  Urinary Catheter: None   Colostomy/Ileostomy/Ileal Conduit: No       Date of Last BM: 12/10/22    Intake/Output Summary (Last 24 hours) at 12/11/2022 1704  Last data filed at 12/11/2022 1642  Gross per 24 hour   Intake 720 ml   Output 0 ml   Net 720 ml     I/O last 3 completed shifts: In: 1700 [P.O.:1700]  Out: -     Safety Concerns: At Risk for Falls    Impairments/Disabilities:      Vision    Nutrition Therapy:  Current Nutrition Therapy:   - Oral Diet:  General    Routes of Feeding: Oral  Liquids: Thin Liquids  Daily Fluid Restriction: no  Last Modified Barium Swallow with Video (Video Swallowing Test): not done    Treatments at the Time of Hospital Discharge:   Respiratory Treatments: n/a  Oxygen Therapy:  is on oxygen at 2 L/min per nasal cannula. Baseline patient is on room air  Ventilator:    - BiPAP    ,   only when sleeping and device from home    Rehab Therapies: n/a  Weight Bearing Status/Restrictions: No weight bearing restrictions  Other Medical Equipment (for information only, NOT a DME order):     Other Treatments:     Patient's personal belongings (please select all that are sent with patient):  Glasses, phone, phone , bipap    RN SIGNATURE:  Electronically signed by Miguel Georges RN on 12/11/22 at 5:08 PM EST    CASE MANAGEMENT/SOCIAL WORK SECTION    Inpatient Status Date:     Readmission Risk Assessment Score:  Readmission Risk              Risk of Unplanned Readmission:  9           Discharging to Facility/ Agency   Name:   Address:  Phone:  Fax:    Dialysis Facility (if applicable)   Name:  Address:  Dialysis Schedule:  Phone:  Fax:    / signature: {Esignature:[id number]}    PHYSICIAN SECTION    Prognosis: {Prognosis:8775420272}    Condition at Discharge: [facility] Patient Condition:[id number]}    Rehab Potential (if transferring to Rehab): {Prognosis:3288258030}    Recommended Labs or Other Treatments After Discharge: ***    Physician Certification: I certify the above information and transfer of Destiny Antunez  is necessary for the continuing treatment of the diagnosis listed and that he requires {Admit to Appropriate Level of Care:33847} for {GREATER/LESS:879659483} 30 days.      Update Admission H&P: {CHP DME Changes in GDLUR:616711905}    PHYSICIAN SIGNATURE:  {Esignature:[id number]}

## 2022-12-11 NOTE — DISCHARGE SUMMARY
[address]  Discharge Summary  Patient ID:  Kylah Francisco  [id number] 35 y.o. 1955    Admit date: 12/9/2022    Discharge date: 12/11/2022    Admitting Physician: Adalid Singer MD     Discharge Provider: Joseph Sanford, [address] [facility] Course: Kylah Francisco presented 12/9/2022 as an outpatient for coronary angiography following an abnormal stress test and KIM. LHC showed severe CAD. CT surgery recommended evaluation at [facility] or [facility] due to patient size. Rhythm overnight has been sinus. This morning patient reports significant shortness of breath with minimal exertion. Assessment:  CAD: severe on angiogram 12/9/2022   - CT surgery consulted but unable to accommodate at [facility] due to size  HTN: improved  HLD  Morbid obesity  History of PE    Plan:  1. He was found to have severe CAD and referred to CT surgery. However, unable to perform CABG here due to patient size, referred to  or [facility]. With ongoing significant SOB, will initiate transfer to  for CT surgery evaluation. I discussed this with Dr. Cuco Morocho and [facility] team. Awaiting bed at [facility]. 2. Holding home xarelto while surgical plan pending; lovenox  3. Continue aspirin, statin, toprol, lisinopril   4. Transfer to [facility] today pending bed availability    If patient does not transfer to [facility] today due to no beds available, this note will serve as progress note.      Admission Diagnoses: Abnormal result of other cardiovascular function study [R94.39]  CAD in native artery [I25.10]    Discharge Diagnoses:   Patient Active Problem List   Diagnosis    Chest pain    HTN (hypertension)    Morbid obesity with BMI of 50.0-59.9, adult (HCC)    Hx pulmonary embolism    Shortness of breath    Chest discomfort    Diaphoresis    Abnormal cardiovascular stress test    Coronary artery disease involving native coronary artery of native heart without angina pectoris    CAD in native artery      Discharged Condition: serious  The patient was seen and examined on day of discharge and this discharge summary is in conjunction with any daily progress note from day of discharge. Consults:  None  Physical Exam:  BP (!) 173/85   Pulse 74   Temp 98.1 °F (36.7 °C)   Resp 18   Ht 5' 9\" (1.753 m)   Wt (!) 430 lb 8 oz (195.3 kg)   SpO2 99%   BMI 63.57 kg/m²     Intake/Output Summary (Last 24 hours) at 12/11/2022 1222  Last data filed at 12/10/2022 1847  Gross per 24 hour   Intake 840 ml   Output --   Net 840 ml       General:  Awake, alert, NAD, obese  Skin:  Warm and dry  Neck:  JVD<8, no bruit  Chest:  Clear to auscultation, no wheezes/rhonchi/rales  Cardiovascular:  RRR S1S2  Abdomen:  Soft, nontender, +bowel sounds, obese  Extremities: +edema  Right radial site soft, no hematoma, 2+ pulse    Significant Diagnostic Studies:     Echo 9/2022:  Left ventricle is normal in size. Normal left ventricular wall thickness. Left ventricular systolic function is normal. (LVEF>/=55%)   Overall left ventricular ejection fraction is estimated to be 55-60%. Normal Diastolic Function. No significant valvular abnormalities identified. Coronary angiogram 12/9/2022:  1. Left heart catheterization  2. Selective left and right coronary angiogram  3. Left ventriculography   Procedure Findings:  1. Severe multi vessel coronary artery diease              ~complex DIAG, and trifurcating CIRC/OM/RI  2. Normal left ventricular function with EF estimated at 55-60%  3. Elevated left ventricular filling pressures with an LVEDP of 24  4. Dilated left ventricle  Details:              Paul Crabtree was brought to the cardiac catheterization lab in a fasting state after informed consent was obtained. If the patient was able to provide written consent, it was obtained. The patient's vitals were monitored through out the procedure. The patient was sedated using the appropriate levels of sedation and ASA guidelines.               The appropriate access site area was prepped and drapped in a sterile fashion. The area was anesthetized with 2% lidocaine. Using the modified Seldinger technique, an arterial sheath was introduced into the arterial access site using a single anterior wall puncture. The sheath was flushed and prepped in usual fashion. Catheters used during the procedure included 5 Azerbaijani TIG 4.0 catheter, 5 Western Joan JR4, and 6 Western Joan XB 4.0. The catheters were advanced and removed over a .035\" wire, into the appropriate positions. Multiple angiographic views were obtained. An LV gram was obtained. Findings:  1. Left main coronary artery was large. There is mild tapering atherosclerosis within the ostial and midportion. 2. Left anterior descending artery has modest disease throughout. There is a large first diagonal branch that has 70 to 80% proximal and ostial stenosis. 3. Left circumflex has severe atherosclerotic disease. It was moderate in size. There was a moderate sized obtuse marginal branch. Circumflex coronary artery is a dominant left-sided vessel of the circulation. There is a large trifurcating complex lesion involving the proximal portion of the circumflex. The ramus intermediate and OM1 have 90 to 95% atherosclerosis. There is a critical lesion within the proximal circumflex that appears to be about 70%. 4. Right coronary artery has moderate atherosclerotic disease. It was moderate in size and was the co-dominant artery. 5. Left ventriculogram showed normal LVEF at 55-60%. Wall motion was normal .  Overall left ventricular size appeared to be dilated. Left ventricular end-diastolic pressure was measured at 25. CONCLUSIONS:  1. Severe multi-vessel coronary artery disease with critical disease of the diagonal and a complex trifurcation lesion of the circumflex. ASSESSMENT/RECOMMENDATIONS:  1. Consultation for cardiothoracic surgery. The circumflex trifurcation lesion is not favorable continues revascularization. ~Consideration for bypass to the diagonal, circumflex, ramus intermediate, and obtuse marginal  2. Hold oral anticoagulation  3. Start Lovenox Sunday December 11 -patient will require 0.5 mg/kg twice daily. ~He did take his oral anticoagulation on Friday, December 9 in the AM.    Labs:   Lab Results   Component Value Date    CREATININE 0.9 12/11/2022    BUN 20 12/11/2022     12/11/2022    K 5.0 12/11/2022    CL 99 12/11/2022    CO2 32 12/11/2022      Lab Results   Component Value Date    WBC 5.8 12/11/2022    HGB 9.0 (L) 12/11/2022    HCT 29.2 (L) 12/11/2022    MCV 71.5 (L) 12/11/2022     12/11/2022      Lab Results   Component Value Date    INR 1.45 (H) 12/09/2022    PROTIME 17.6 (H) 12/09/2022      Lab Results   Component Value Date    BNP 16.5 02/06/2013     Disposition: [facility] for CT surgery    Patient Instructions:      Medication List        START taking these medications      enoxaparin 60 MG/0.6ML  Commonly known as: LOVENOX  Inject 0.6 mLs into the skin 2 times daily     nitroGLYCERIN 0.4 MG SL tablet  Commonly known as: NITROSTAT  up to max of 3 total doses. If no relief after 1 dose, call 911. CHANGE how you take these medications      atorvastatin 40 MG tablet  Commonly known as: LIPITOR  Take 1 tablet by mouth nightly  What changed:   medication strength  See the new instructions. lisinopril 20 MG tablet  Commonly known as: PRINIVIL;ZESTRIL  Take 2 tablets by mouth daily  What changed: See the new instructions.             CONTINUE taking these medications      albuterol sulfate  (90 Base) MCG/ACT inhaler  Commonly known as: Proventil HFA  Inhale 2 puffs into the lungs every 4 hours as needed for Wheezing     ALPRAZolam 0.5 MG tablet  Commonly known as: XANAX     aspirin 81 MG tablet     CENTRUM SILVER PO     CITRACAL PO     furosemide 40 MG tablet  Commonly known as: LASIX  Take one tablet daily     isosorbide mononitrate 30 MG extended release tablet  Commonly known as: IMDUR  TAKE 1 TABLET BY MOUTH EVERY DAY     metoprolol succinate 25 MG extended release tablet  Commonly known as: TOPROL XL     potassium chloride 10 MEQ extended release tablet  Commonly known as: KLOR-CON M  Take 1 tablet by mouth 2 times daily            STOP taking these medications      vitamin D 1.25 MG (54239 UT) Caps capsule  Commonly known as: ERGOCALCIFEROL     warfarin 5 MG tablet  Commonly known as: COUMADIN     Xarelto 10 MG Tabs tablet  Generic drug: rivaroxaban               Where to Get Your Medications        Information about where to get these medications is not yet available    Ask your nurse or doctor about these medications  atorvastatin 40 MG tablet  enoxaparin 60 MG/0.6ML  lisinopril 20 MG tablet       Activity: to be determined by [facility] team  Diet: cardiac diet  Wound Care: keep wound clean and dry    Follow-up to be determined after [facility] discharge    Signed:  JOHN Frey CNP, 12/11/2022, 12:22 PM

## 2022-12-11 NOTE — PROGRESS NOTES
Transport here to transfer patient to [facility]. Tele box removed and [facility] notifed. Patient sent with peripheral IV in left hand, transport aware. Patient assisted to stretcher. Wife followed patient out of hospital with pt belongings including: glasses, phone, phone , and home bipap.

## 2023-03-03 RX ORDER — ISOSORBIDE MONONITRATE 30 MG/1
TABLET, EXTENDED RELEASE ORAL
Qty: 2 TABLET | Refills: 0 | Status: SHIPPED | OUTPATIENT
Start: 2023-03-03

## 2023-03-20 RX ORDER — ISOSORBIDE MONONITRATE 30 MG/1
TABLET, EXTENDED RELEASE ORAL
Qty: 90 TABLET | Status: CANCELLED | OUTPATIENT
Start: 2023-03-20

## 2023-03-21 RX ORDER — ISOSORBIDE MONONITRATE 30 MG/1
TABLET, EXTENDED RELEASE ORAL
Qty: 2 TABLET | Refills: 0 | OUTPATIENT
Start: 2023-03-21

## 2023-11-06 ENCOUNTER — ANESTHESIA EVENT (OUTPATIENT)
Dept: ENDOSCOPY | Age: 68
DRG: 920 | End: 2023-11-06
Payer: COMMERCIAL

## 2023-11-07 ENCOUNTER — HOSPITAL ENCOUNTER (INPATIENT)
Age: 68
LOS: 2 days | Discharge: HOME | DRG: 920 | End: 2023-11-09
Attending: INTERNAL MEDICINE | Admitting: STUDENT IN AN ORGANIZED HEALTH CARE EDUCATION/TRAINING PROGRAM
Payer: COMMERCIAL

## 2023-11-07 ENCOUNTER — ANESTHESIA (OUTPATIENT)
Dept: ENDOSCOPY | Age: 68
DRG: 920 | End: 2023-11-07
Payer: COMMERCIAL

## 2023-11-07 DIAGNOSIS — D50.0 IRON DEFICIENCY ANEMIA DUE TO CHRONIC BLOOD LOSS: ICD-10-CM

## 2023-11-07 PROBLEM — K92.2 GIB (GASTROINTESTINAL BLEEDING): Status: ACTIVE | Noted: 2023-11-07

## 2023-11-07 LAB
DEPRECATED RDW RBC AUTO: 22.1 % (ref 12.4–15.4)
DEPRECATED RDW RBC AUTO: 22.7 % (ref 12.4–15.4)
HCT VFR BLD AUTO: 27.3 % (ref 40.5–52.5)
HCT VFR BLD AUTO: 30.8 % (ref 40.5–52.5)
HGB BLD-MCNC: 8.3 G/DL (ref 13.5–17.5)
HGB BLD-MCNC: 9.4 G/DL (ref 13.5–17.5)
MCH RBC QN AUTO: 22.7 PG (ref 26–34)
MCH RBC QN AUTO: 22.8 PG (ref 26–34)
MCHC RBC AUTO-ENTMCNC: 30.2 G/DL (ref 31–36)
MCHC RBC AUTO-ENTMCNC: 30.4 G/DL (ref 31–36)
MCV RBC AUTO: 74.8 FL (ref 80–100)
MCV RBC AUTO: 74.9 FL (ref 80–100)
PLATELET # BLD AUTO: 186 K/UL (ref 135–450)
PLATELET # BLD AUTO: 198 K/UL (ref 135–450)
PLATELET BLD QL SMEAR: ADEQUATE
PMV BLD AUTO: 6.5 FL (ref 5–10.5)
PMV BLD AUTO: 6.5 FL (ref 5–10.5)
RBC # BLD AUTO: 3.64 M/UL (ref 4.2–5.9)
RBC # BLD AUTO: 4.12 M/UL (ref 4.2–5.9)
SLIDE REVIEW: ABNORMAL
WBC # BLD AUTO: 4.9 K/UL (ref 4–11)
WBC # BLD AUTO: 6 K/UL (ref 4–11)

## 2023-11-07 PROCEDURE — 2580000003 HC RX 258: Performed by: STUDENT IN AN ORGANIZED HEALTH CARE EDUCATION/TRAINING PROGRAM

## 2023-11-07 PROCEDURE — 3609009900 HC COLONOSCOPY W/CONTROL BLEEDING ANY METHOD: Performed by: INTERNAL MEDICINE

## 2023-11-07 PROCEDURE — 36415 COLL VENOUS BLD VENIPUNCTURE: CPT

## 2023-11-07 PROCEDURE — 3609010500 HC COLONOSCOPY POLYPECTOMY REMOVAL HOT BIOPSY/STOMA: Performed by: INTERNAL MEDICINE

## 2023-11-07 PROCEDURE — 1200000000 HC SEMI PRIVATE

## 2023-11-07 PROCEDURE — 7100000010 HC PHASE II RECOVERY - FIRST 15 MIN: Performed by: INTERNAL MEDICINE

## 2023-11-07 PROCEDURE — 7100000011 HC PHASE II RECOVERY - ADDTL 15 MIN: Performed by: INTERNAL MEDICINE

## 2023-11-07 PROCEDURE — XW0H886 INTRODUCTION OF MINERAL-BASED TOPICAL HEMOSTATIC AGENT INTO LOWER GI, VIA NATURAL OR ARTIFICIAL OPENING ENDOSCOPIC, NEW TECHNOLOGY GROUP 6: ICD-10-PCS | Performed by: INTERNAL MEDICINE

## 2023-11-07 PROCEDURE — 2709999900 HC NON-CHARGEABLE SUPPLY: Performed by: INTERNAL MEDICINE

## 2023-11-07 PROCEDURE — 3700000001 HC ADD 15 MINUTES (ANESTHESIA): Performed by: INTERNAL MEDICINE

## 2023-11-07 PROCEDURE — 2700000000 HC OXYGEN THERAPY PER DAY

## 2023-11-07 PROCEDURE — 2500000003 HC RX 250 WO HCPCS: Performed by: NURSE ANESTHETIST, CERTIFIED REGISTERED

## 2023-11-07 PROCEDURE — 0DBN8ZX EXCISION OF SIGMOID COLON, VIA NATURAL OR ARTIFICIAL OPENING ENDOSCOPIC, DIAGNOSTIC: ICD-10-PCS | Performed by: INTERNAL MEDICINE

## 2023-11-07 PROCEDURE — 85027 COMPLETE CBC AUTOMATED: CPT

## 2023-11-07 PROCEDURE — 2580000003 HC RX 258: Performed by: ANESTHESIOLOGY

## 2023-11-07 PROCEDURE — 6360000002 HC RX W HCPCS: Performed by: INTERNAL MEDICINE

## 2023-11-07 PROCEDURE — 6370000000 HC RX 637 (ALT 250 FOR IP): Performed by: STUDENT IN AN ORGANIZED HEALTH CARE EDUCATION/TRAINING PROGRAM

## 2023-11-07 PROCEDURE — 3609019800 HC COLONOSCOPY WITH SUBMUCOSAL INJECTION: Performed by: INTERNAL MEDICINE

## 2023-11-07 PROCEDURE — 94761 N-INVAS EAR/PLS OXIMETRY MLT: CPT

## 2023-11-07 PROCEDURE — C1889 IMPLANT/INSERT DEVICE, NOC: HCPCS | Performed by: INTERNAL MEDICINE

## 2023-11-07 PROCEDURE — 6360000002 HC RX W HCPCS: Performed by: NURSE ANESTHETIST, CERTIFIED REGISTERED

## 2023-11-07 PROCEDURE — 0W3P8ZZ CONTROL BLEEDING IN GASTROINTESTINAL TRACT, VIA NATURAL OR ARTIFICIAL OPENING ENDOSCOPIC: ICD-10-PCS | Performed by: INTERNAL MEDICINE

## 2023-11-07 PROCEDURE — 6370000000 HC RX 637 (ALT 250 FOR IP): Performed by: ANESTHESIOLOGY

## 2023-11-07 PROCEDURE — 3700000000 HC ANESTHESIA ATTENDED CARE: Performed by: INTERNAL MEDICINE

## 2023-11-07 PROCEDURE — C1052 HEMOSTATIC AGENT, GI, TOPIC: HCPCS | Performed by: INTERNAL MEDICINE

## 2023-11-07 PROCEDURE — 88305 TISSUE EXAM BY PATHOLOGIST: CPT

## 2023-11-07 DEVICE — CLIP
Type: IMPLANTABLE DEVICE | Site: SIGMOID COLON | Status: FUNCTIONAL
Brand: RESOLUTION 360™ ULTRA CLIP

## 2023-11-07 RX ORDER — SODIUM CHLORIDE 0.9 % (FLUSH) 0.9 %
5-40 SYRINGE (ML) INJECTION EVERY 12 HOURS SCHEDULED
Status: DISCONTINUED | OUTPATIENT
Start: 2023-11-07 | End: 2023-11-07 | Stop reason: HOSPADM

## 2023-11-07 RX ORDER — SODIUM CHLORIDE 0.9 % (FLUSH) 0.9 %
5-40 SYRINGE (ML) INJECTION PRN
Status: DISCONTINUED | OUTPATIENT
Start: 2023-11-07 | End: 2023-11-07 | Stop reason: HOSPADM

## 2023-11-07 RX ORDER — ATORVASTATIN CALCIUM 40 MG/1
40 TABLET, FILM COATED ORAL NIGHTLY
Status: DISCONTINUED | OUTPATIENT
Start: 2023-11-07 | End: 2023-11-09 | Stop reason: HOSPADM

## 2023-11-07 RX ORDER — PROPOFOL 10 MG/ML
INJECTION, EMULSION INTRAVENOUS PRN
Status: DISCONTINUED | OUTPATIENT
Start: 2023-11-07 | End: 2023-11-07 | Stop reason: SDUPTHER

## 2023-11-07 RX ORDER — ACETAMINOPHEN 325 MG/1
650 TABLET ORAL EVERY 6 HOURS PRN
Status: DISCONTINUED | OUTPATIENT
Start: 2023-11-07 | End: 2023-11-09 | Stop reason: HOSPADM

## 2023-11-07 RX ORDER — LISINOPRIL 20 MG/1
40 TABLET ORAL DAILY
Status: DISCONTINUED | OUTPATIENT
Start: 2023-11-07 | End: 2023-11-09 | Stop reason: HOSPADM

## 2023-11-07 RX ORDER — ONDANSETRON 4 MG/1
4 TABLET, ORALLY DISINTEGRATING ORAL EVERY 8 HOURS PRN
Status: DISCONTINUED | OUTPATIENT
Start: 2023-11-07 | End: 2023-11-09 | Stop reason: HOSPADM

## 2023-11-07 RX ORDER — ONDANSETRON 2 MG/ML
4 INJECTION INTRAMUSCULAR; INTRAVENOUS EVERY 30 MIN PRN
Status: DISCONTINUED | OUTPATIENT
Start: 2023-11-07 | End: 2023-11-07 | Stop reason: HOSPADM

## 2023-11-07 RX ORDER — SODIUM CHLORIDE 9 MG/ML
INJECTION, SOLUTION INTRAVENOUS PRN
Status: DISCONTINUED | OUTPATIENT
Start: 2023-11-07 | End: 2023-11-09 | Stop reason: HOSPADM

## 2023-11-07 RX ORDER — EPINEPHRINE 1 MG/ML
INJECTION, SOLUTION, CONCENTRATE INTRAVENOUS PRN
Status: DISCONTINUED | OUTPATIENT
Start: 2023-11-07 | End: 2023-11-07 | Stop reason: ALTCHOICE

## 2023-11-07 RX ORDER — METOPROLOL SUCCINATE 25 MG/1
25 TABLET, EXTENDED RELEASE ORAL DAILY
Status: DISCONTINUED | OUTPATIENT
Start: 2023-11-07 | End: 2023-11-09 | Stop reason: HOSPADM

## 2023-11-07 RX ORDER — ASPIRIN 81 MG/1
81 TABLET, CHEWABLE ORAL DAILY
Status: DISCONTINUED | OUTPATIENT
Start: 2023-11-07 | End: 2023-11-08

## 2023-11-07 RX ORDER — ONDANSETRON 2 MG/ML
4 INJECTION INTRAMUSCULAR; INTRAVENOUS EVERY 6 HOURS PRN
Status: DISCONTINUED | OUTPATIENT
Start: 2023-11-07 | End: 2023-11-09 | Stop reason: HOSPADM

## 2023-11-07 RX ORDER — ACETAMINOPHEN 650 MG/1
650 SUPPOSITORY RECTAL EVERY 6 HOURS PRN
Status: DISCONTINUED | OUTPATIENT
Start: 2023-11-07 | End: 2023-11-09 | Stop reason: HOSPADM

## 2023-11-07 RX ORDER — POLYETHYLENE GLYCOL 3350 17 G/17G
17 POWDER, FOR SOLUTION ORAL DAILY PRN
Status: DISCONTINUED | OUTPATIENT
Start: 2023-11-07 | End: 2023-11-09 | Stop reason: HOSPADM

## 2023-11-07 RX ORDER — SODIUM CHLORIDE 9 MG/ML
INJECTION, SOLUTION INTRAVENOUS PRN
Status: DISCONTINUED | OUTPATIENT
Start: 2023-11-07 | End: 2023-11-07 | Stop reason: HOSPADM

## 2023-11-07 RX ORDER — ISOSORBIDE MONONITRATE 30 MG/1
30 TABLET, EXTENDED RELEASE ORAL DAILY
Status: DISCONTINUED | OUTPATIENT
Start: 2023-11-07 | End: 2023-11-09 | Stop reason: HOSPADM

## 2023-11-07 RX ORDER — MAGNESIUM SULFATE IN WATER 40 MG/ML
2000 INJECTION, SOLUTION INTRAVENOUS PRN
Status: DISCONTINUED | OUTPATIENT
Start: 2023-11-07 | End: 2023-11-09 | Stop reason: HOSPADM

## 2023-11-07 RX ORDER — NITROGLYCERIN 0.4 MG/1
0.4 TABLET SUBLINGUAL EVERY 5 MIN PRN
Status: DISCONTINUED | OUTPATIENT
Start: 2023-11-07 | End: 2023-11-09 | Stop reason: HOSPADM

## 2023-11-07 RX ORDER — IPRATROPIUM BROMIDE AND ALBUTEROL SULFATE 2.5; .5 MG/3ML; MG/3ML
1 SOLUTION RESPIRATORY (INHALATION) ONCE
Status: COMPLETED | OUTPATIENT
Start: 2023-11-07 | End: 2023-11-07

## 2023-11-07 RX ORDER — POTASSIUM CHLORIDE 7.45 MG/ML
10 INJECTION INTRAVENOUS PRN
Status: DISCONTINUED | OUTPATIENT
Start: 2023-11-07 | End: 2023-11-09 | Stop reason: HOSPADM

## 2023-11-07 RX ORDER — LIDOCAINE HYDROCHLORIDE 20 MG/ML
INJECTION, SOLUTION INFILTRATION; PERINEURAL PRN
Status: DISCONTINUED | OUTPATIENT
Start: 2023-11-07 | End: 2023-11-07 | Stop reason: SDUPTHER

## 2023-11-07 RX ORDER — SODIUM CHLORIDE 0.9 % (FLUSH) 0.9 %
5-40 SYRINGE (ML) INJECTION EVERY 12 HOURS SCHEDULED
Status: DISCONTINUED | OUTPATIENT
Start: 2023-11-07 | End: 2023-11-09 | Stop reason: HOSPADM

## 2023-11-07 RX ORDER — LIDOCAINE HYDROCHLORIDE 10 MG/ML
1 INJECTION, SOLUTION EPIDURAL; INFILTRATION; INTRACAUDAL; PERINEURAL
Status: DISCONTINUED | OUTPATIENT
Start: 2023-11-07 | End: 2023-11-07 | Stop reason: HOSPADM

## 2023-11-07 RX ORDER — SODIUM CHLORIDE, SODIUM LACTATE, POTASSIUM CHLORIDE, CALCIUM CHLORIDE 600; 310; 30; 20 MG/100ML; MG/100ML; MG/100ML; MG/100ML
INJECTION, SOLUTION INTRAVENOUS CONTINUOUS
Status: DISCONTINUED | OUTPATIENT
Start: 2023-11-07 | End: 2023-11-07 | Stop reason: HOSPADM

## 2023-11-07 RX ORDER — POTASSIUM CHLORIDE 20 MEQ/1
40 TABLET, EXTENDED RELEASE ORAL PRN
Status: DISCONTINUED | OUTPATIENT
Start: 2023-11-07 | End: 2023-11-09 | Stop reason: HOSPADM

## 2023-11-07 RX ORDER — SODIUM CHLORIDE 0.9 % (FLUSH) 0.9 %
5-40 SYRINGE (ML) INJECTION PRN
Status: DISCONTINUED | OUTPATIENT
Start: 2023-11-07 | End: 2023-11-09 | Stop reason: HOSPADM

## 2023-11-07 RX ADMIN — IPRATROPIUM BROMIDE AND ALBUTEROL SULFATE 1 DOSE: 2.5; .5 SOLUTION RESPIRATORY (INHALATION) at 12:42

## 2023-11-07 RX ADMIN — PROPOFOL 100 MG: 10 INJECTION, EMULSION INTRAVENOUS at 13:04

## 2023-11-07 RX ADMIN — Medication 10 ML: at 21:01

## 2023-11-07 RX ADMIN — LIDOCAINE HYDROCHLORIDE 80 MG: 20 INJECTION, SOLUTION INFILTRATION; PERINEURAL at 13:48

## 2023-11-07 RX ADMIN — PROPOFOL 100 MG: 10 INJECTION, EMULSION INTRAVENOUS at 13:08

## 2023-11-07 RX ADMIN — ATORVASTATIN CALCIUM 40 MG: 40 TABLET, FILM COATED ORAL at 21:00

## 2023-11-07 RX ADMIN — PROPOFOL 50 MG: 10 INJECTION, EMULSION INTRAVENOUS at 13:29

## 2023-11-07 RX ADMIN — ACETAMINOPHEN 650 MG: 325 TABLET ORAL at 21:00

## 2023-11-07 RX ADMIN — PROPOFOL 50 MG: 10 INJECTION, EMULSION INTRAVENOUS at 13:13

## 2023-11-07 RX ADMIN — PROPOFOL 50 MG: 10 INJECTION, EMULSION INTRAVENOUS at 13:22

## 2023-11-07 RX ADMIN — SODIUM CHLORIDE, POTASSIUM CHLORIDE, SODIUM LACTATE AND CALCIUM CHLORIDE: 600; 310; 30; 20 INJECTION, SOLUTION INTRAVENOUS at 12:46

## 2023-11-07 ASSESSMENT — PAIN SCALES - GENERAL
PAINLEVEL_OUTOF10: 0
PAINLEVEL_OUTOF10: 7
PAINLEVEL_OUTOF10: 0

## 2023-11-07 ASSESSMENT — PAIN - FUNCTIONAL ASSESSMENT: PAIN_FUNCTIONAL_ASSESSMENT: 0-10

## 2023-11-07 ASSESSMENT — PAIN DESCRIPTION - LOCATION: LOCATION: HEAD;KNEE

## 2023-11-07 ASSESSMENT — PAIN DESCRIPTION - DESCRIPTORS: DESCRIPTORS: ACHING

## 2023-11-07 NOTE — PROCEDURES
Colonoscopy Procedure  Note          Patient: Eloisa Samuel  : 1955      Procedure: Colonoscopy hot snare polypectomy, tattoo placement, Hemospray for post polypectomy bleeding    Date:  2023    Primary Care Physician: Triston Khan MD     Operative surgeon: Park Reece MD  Previous Colonoscopy: None  Consent: I explained and discussed the risk, benefits and alternatives for the procedure with the patient and obtained the patient's consent for the procedure. We discussed the specific risks including bleeding, perforation, post-procedure abdominal pain, and missed lesions which could lead to interval colorectal cancers. History:  The patient is a 77 y.o. male who presents for iron deficiency anemia. Had a evaluation for symptomatic anemia at [facility] underwent upper endoscopy at that time. Was having some darker stools EGD was reported normal.  He was transfused x2. And given IV iron. Anticoagulation is currently on held. Plan was for colonoscopy and then an upper endoscopy with capsule deployment however the insurance would not cover the capsule without a negative colonoscopy and could not be done the same day. Indications: Iron deficiency anemia     Past Medical History:   Diagnosis Date    Arthritis     knees    H/O gastric bypass 2014    Hyperlipidemia     Hypertension     Obesity     Panic attacks     PE (pulmonary embolism)     Sleep apnea     CPAP currently broken      Preoperative Diagnosis: Iron deficiency anemia due to chronic blood loss [D50.0]  Post Operative Diagnosis: Sigmoid colon polyp at 25 cm  ASA: 3  SEDATION: MAC      Procedures Performed: Colonoscopy   Scope Type: Adult    Procedure Details:      With the patient in left lateral decubitus position the endoscope was inserted through the anorectal area into the rectum. The scope was then advanced through the length of the colon to the cecum. The quality of preparation was good.   The

## 2023-11-07 NOTE — ANESTHESIA POSTPROCEDURE EVALUATION
Department of Anesthesiology  Postprocedure Note    Patient: Tremayne Rogers  MRN: [id number]  YOB: 1955  Date of evaluation: 11/7/2023      Procedure Summary     Date: 11/07/23 Room / Location: [facility] [address] [place] [address]    Anesthesia Start: 1300 Anesthesia Stop: 6864    Procedures:       COLONOSCOPY POLYPECTOMY REMOVAL HOT BIOPSY/STOMA      COLONOSCOPY CONTROL HEMORRHAGE      COLONOSCOPY SUBMUCOSAL INJECTION Diagnosis:       Iron deficiency anemia due to chronic blood loss      (Iron deficiency anemia due to chronic blood loss [D50.0])    Surgeons: Christine Holbrook MD Responsible Provider: Adriano Vasquez MD    Anesthesia Type: MAC ASA Status: 3          Anesthesia Type: No value filed.     Jeff Phase I: Jeff Score: 10    Jeff Phase II: Jeff Score: 10      Anesthesia Post Evaluation    Patient location during evaluation: PACU  Level of consciousness: awake  Airway patency: patent  Nausea & Vomiting: no vomiting  Complications: no  Cardiovascular status: blood pressure returned to baseline  Respiratory status: acceptable  Hydration status: stable  Pain management: adequate

## 2023-11-07 NOTE — DISCHARGE INSTRUCTIONS
PATIENT INSTRUCTIONS  POST-SEDATION          IMMEDIATELY FOLLOWING PROCEDURE:    Do not drive or operate machinery for the first twenty four hours after surgery. Do not make any important decisions for twenty four hours after surgery or while taking narcotic pain medications or sedatives. You should NOT BE LEFT UNATTENDED OR ALONE. A responsible adult should be with you for the rest of the day of your procedure and also during the night for your protection and safety. You may experience some light headedness. Rest at home with activity as tolerated. You may not need to go to bed, but it is important to rest for the next 24 hours. You should not engage in athletic sports such as basketball, volleyball, jogging, skating, or activities requiring refined motor skills for 24 hours. If you develop intractable nausea and vomiting or a severe headache please notify your doctor immediately. You are not expected to have any fever, but if you feel warm, take your temperature. If you have a fever 101 degrees or higher, call your doctor. If you have had an Endoscopy:   *Eat lightly for your first meal and gradually resume your normal / prescribed diet. *If you have had a colonoscopy, do not expect a normal bowel movement for approximately three days due to the cleansing of the large intestine prior to colonoscopy. ONCE YOU ARE HOME, IF YOU SHOULD HAVE:  Difficulty in breathing, persistent nausea or vomiting, bleeding you feel is excessive, or pain that is unusual, increased abdominal bloating, or any swelling, fever / chills, call your physician. If you cannot contact your physician, but feel that your signs and symptoms need a physician's attention, go to the Emergency Department. FOLLOW-UP:    Please follow up with your Primary Care Provider as scheduled or needed. Call Dr. Mateo Dick MD if there are any GI concerns. [phone]    Repeat Colonoscopy pending pathology.     You may be

## 2023-11-07 NOTE — CONSULTS
Consult Placed     Who: de la rosa  Date:11/7/23  Time:1710     Electronically signed by Eliseo Coley on 11/7/2023 at 5:08 PM

## 2023-11-08 ENCOUNTER — APPOINTMENT (OUTPATIENT)
Dept: CT IMAGING | Age: 68
DRG: 920 | End: 2023-11-08
Attending: INTERNAL MEDICINE
Payer: COMMERCIAL

## 2023-11-08 PROBLEM — D50.0 IRON DEFICIENCY ANEMIA DUE TO CHRONIC BLOOD LOSS: Status: ACTIVE | Noted: 2023-11-08

## 2023-11-08 LAB
ALBUMIN SERPL-MCNC: 3.4 G/DL (ref 3.4–5)
ALP SERPL-CCNC: 96 U/L (ref 40–129)
ALT SERPL-CCNC: <5 U/L (ref 10–40)
ANION GAP SERPL CALCULATED.3IONS-SCNC: 8 MMOL/L (ref 3–16)
AST SERPL-CCNC: 12 U/L (ref 15–37)
BASOPHILS # BLD: 0 K/UL (ref 0–0.2)
BASOPHILS NFR BLD: 0.4 %
BILIRUB DIRECT SERPL-MCNC: <0.2 MG/DL (ref 0–0.3)
BILIRUB INDIRECT SERPL-MCNC: ABNORMAL MG/DL (ref 0–1)
BILIRUB SERPL-MCNC: 0.6 MG/DL (ref 0–1)
BUN SERPL-MCNC: 15 MG/DL (ref 7–20)
CALCIUM SERPL-MCNC: 8.2 MG/DL (ref 8.3–10.6)
CHLORIDE SERPL-SCNC: 104 MMOL/L (ref 99–110)
CO2 SERPL-SCNC: 29 MMOL/L (ref 21–32)
CREAT SERPL-MCNC: 0.9 MG/DL (ref 0.8–1.3)
DEPRECATED RDW RBC AUTO: 22.6 % (ref 12.4–15.4)
EOSINOPHIL # BLD: 0.1 K/UL (ref 0–0.6)
EOSINOPHIL NFR BLD: 2 %
GFR SERPLBLD CREATININE-BSD FMLA CKD-EPI: >60 ML/MIN/{1.73_M2}
GLUCOSE SERPL-MCNC: 81 MG/DL (ref 70–99)
HCT VFR BLD AUTO: 27.2 % (ref 40.5–52.5)
HCT VFR BLD AUTO: 29.1 % (ref 40.5–52.5)
HCT VFR BLD AUTO: 31.6 % (ref 40.5–52.5)
HGB BLD-MCNC: 8.3 G/DL (ref 13.5–17.5)
HGB BLD-MCNC: 8.8 G/DL (ref 13.5–17.5)
HGB BLD-MCNC: 9.3 G/DL (ref 13.5–17.5)
LYMPHOCYTES # BLD: 1 K/UL (ref 1–5.1)
LYMPHOCYTES NFR BLD: 16 %
MCH RBC QN AUTO: 22.8 PG (ref 26–34)
MCHC RBC AUTO-ENTMCNC: 30.5 G/DL (ref 31–36)
MCV RBC AUTO: 74.8 FL (ref 80–100)
MONOCYTES # BLD: 0.7 K/UL (ref 0–1.3)
MONOCYTES NFR BLD: 10.6 %
NEUTROPHILS # BLD: 4.5 K/UL (ref 1.7–7.7)
NEUTROPHILS NFR BLD: 71 %
PLATELET # BLD AUTO: 179 K/UL (ref 135–450)
PMV BLD AUTO: 6.6 FL (ref 5–10.5)
POTASSIUM SERPL-SCNC: 4.5 MMOL/L (ref 3.5–5.1)
PROT SERPL-MCNC: 6.1 G/DL (ref 6.4–8.2)
RBC # BLD AUTO: 3.64 M/UL (ref 4.2–5.9)
SODIUM SERPL-SCNC: 141 MMOL/L (ref 136–145)
WBC # BLD AUTO: 6.4 K/UL (ref 4–11)

## 2023-11-08 PROCEDURE — 80076 HEPATIC FUNCTION PANEL: CPT

## 2023-11-08 PROCEDURE — 6360000004 HC RX CONTRAST MEDICATION: Performed by: INTERNAL MEDICINE

## 2023-11-08 PROCEDURE — 85025 COMPLETE CBC W/AUTO DIFF WBC: CPT

## 2023-11-08 PROCEDURE — 97162 PT EVAL MOD COMPLEX 30 MIN: CPT

## 2023-11-08 PROCEDURE — 2580000003 HC RX 258: Performed by: STUDENT IN AN ORGANIZED HEALTH CARE EDUCATION/TRAINING PROGRAM

## 2023-11-08 PROCEDURE — 6370000000 HC RX 637 (ALT 250 FOR IP): Performed by: STUDENT IN AN ORGANIZED HEALTH CARE EDUCATION/TRAINING PROGRAM

## 2023-11-08 PROCEDURE — 80048 BASIC METABOLIC PNL TOTAL CA: CPT

## 2023-11-08 PROCEDURE — 85018 HEMOGLOBIN: CPT

## 2023-11-08 PROCEDURE — 97110 THERAPEUTIC EXERCISES: CPT

## 2023-11-08 PROCEDURE — 99222 1ST HOSP IP/OBS MODERATE 55: CPT | Performed by: SURGERY

## 2023-11-08 PROCEDURE — 74177 CT ABD & PELVIS W/CONTRAST: CPT

## 2023-11-08 PROCEDURE — 1200000000 HC SEMI PRIVATE

## 2023-11-08 PROCEDURE — 94761 N-INVAS EAR/PLS OXIMETRY MLT: CPT

## 2023-11-08 PROCEDURE — 97116 GAIT TRAINING THERAPY: CPT

## 2023-11-08 PROCEDURE — 36415 COLL VENOUS BLD VENIPUNCTURE: CPT

## 2023-11-08 PROCEDURE — 85014 HEMATOCRIT: CPT

## 2023-11-08 PROCEDURE — 2700000000 HC OXYGEN THERAPY PER DAY

## 2023-11-08 RX ORDER — FERROUS SULFATE 325(65) MG
325 TABLET ORAL
Status: DISCONTINUED | OUTPATIENT
Start: 2023-11-09 | End: 2023-11-09 | Stop reason: HOSPADM

## 2023-11-08 RX ADMIN — ACETAMINOPHEN 650 MG: 325 TABLET ORAL at 20:41

## 2023-11-08 RX ADMIN — ACETAMINOPHEN 650 MG: 325 TABLET ORAL at 13:19

## 2023-11-08 RX ADMIN — ATORVASTATIN CALCIUM 40 MG: 40 TABLET, FILM COATED ORAL at 20:37

## 2023-11-08 RX ADMIN — Medication 10 ML: at 10:17

## 2023-11-08 RX ADMIN — ISOSORBIDE MONONITRATE 30 MG: 30 TABLET, EXTENDED RELEASE ORAL at 10:16

## 2023-11-08 RX ADMIN — METOPROLOL SUCCINATE 25 MG: 25 TABLET, EXTENDED RELEASE ORAL at 10:16

## 2023-11-08 RX ADMIN — DIATRIZOATE MEGLUMINE AND DIATRIZOATE SODIUM 12 ML: 660; 100 LIQUID ORAL; RECTAL at 10:16

## 2023-11-08 RX ADMIN — ACETAMINOPHEN 650 MG: 325 TABLET ORAL at 03:32

## 2023-11-08 RX ADMIN — LISINOPRIL 40 MG: 20 TABLET ORAL at 10:16

## 2023-11-08 RX ADMIN — Medication 10 ML: at 20:37

## 2023-11-08 RX ADMIN — IOPAMIDOL 75 ML: 755 INJECTION, SOLUTION INTRAVENOUS at 12:04

## 2023-11-08 ASSESSMENT — PAIN SCALES - GENERAL
PAINLEVEL_OUTOF10: 2
PAINLEVEL_OUTOF10: 2
PAINLEVEL_OUTOF10: 4
PAINLEVEL_OUTOF10: 3

## 2023-11-08 ASSESSMENT — PAIN DESCRIPTION - DESCRIPTORS
DESCRIPTORS: ACHING
DESCRIPTORS: ACHING;DISCOMFORT

## 2023-11-08 ASSESSMENT — PAIN DESCRIPTION - ORIENTATION: ORIENTATION: ANTERIOR

## 2023-11-08 ASSESSMENT — PAIN DESCRIPTION - LOCATION
LOCATION: HEAD
LOCATION: HEAD
LOCATION: KNEE;HEAD

## 2023-11-08 NOTE — PLAN OF CARE
Problem: Safety - Adult  Goal: Free from fall injury  Outcome: Progressing  Flowsheets (Taken 11/7/2023 1948)  Free From Fall Injury:   Instruct family/caregiver on patient safety   Based on caregiver fall risk screen, instruct family/caregiver to ask for assistance with transferring infant if caregiver noted to have fall risk factors     Problem: Pain  Goal: Verbalizes/displays adequate comfort level or baseline comfort level  Outcome: Progressing  Flowsheets (Taken 11/7/2023 1948)  Verbalizes/displays adequate comfort level or baseline comfort level:   Encourage patient to monitor pain and request assistance   Assess pain using appropriate pain scale   Implement non-pharmacological measures as appropriate and evaluate response     Problem: ABCDS Injury Assessment  Goal: Absence of physical injury  Outcome: Progressing  Flowsheets (Taken 11/7/2023 1948)  Absence of Physical Injury: Implement safety measures based on patient assessment

## 2023-11-08 NOTE — CONSULTS
Department of General Surgery Consult    PATIENT NAME: Eloisa Samuel   YOB: 1955    ADMISSION DATE: 11/7/2023 11:43 AM      TODAY'S DATE: 11/8/2023    Reason for Consult:  sigmoid colon polyp, bleeding    Chief Complaint: none    Historian: patient    Requesting Physician:  Melodie Mayo    HISTORY OF PRESENT ILLNESS:              The patient is a 77 y.o. male who presents with recent colonoscopy w/ polypectomy done yesterday by Dr Melodie Mayo, GI as part of workup for iron deficiency anemia. Had recently required transfusion x2U PRBC. At time of scope, found large pedunculated sigmoid polyp. Attempted snare polypectomy which resulted in partial removal and extensive bleeding. Bleeding eventually controlled w/ Epi injection and Hemospray. Admitted for observation to ensure he didn't have recurrent bleeding. We are consulted in case there is recurrent bleeding and needs urgent surgery, or, more likely, to discuss interval elective surgery in the next few weeks. Since admission yesterday pt has not had any rectal bleeding.     Past Medical History:        Diagnosis Date    Arthritis     knees    H/O gastric bypass 07/2014    Hyperlipidemia     Hypertension     Obesity     Panic attacks     PE (pulmonary embolism) 2010    Sleep apnea     CPAP currently broken       Past Surgical History:        Procedure Laterality Date    CHOLECYSTECTOMY  2009    COLONOSCOPY N/A 11/7/2023    COLONOSCOPY POLYPECTOMY REMOVAL HOT BIOPSY/STOMA performed by Park Reece MD at [facility] N/A 11/7/2023    COLONOSCOPY CONTROL HEMORRHAGE performed by Park Reece MD at [facility]  11/7/2023    COLONOSCOPY SUBMUCOSAL INJECTION performed by Park Reece MD at [address] [place] [address]  01/2023    GASTRIC BYPASS SURGERY  2014    HERNIA REPAIR  71/03/0844    umbilical hernia       Current Medications:   Current Facility-Administered Medications:

## 2023-11-08 NOTE — ACP (ADVANCE CARE PLANNING)
Advance Care Planning     Advance Care Planning Inpatient Note  Spiritual Care Department    Today's Date: 11/8/2023  Unit: [place] [facility] TELE/MED SURG/ONC    Received request from IDT Member. Upon review of chart and communication with care team, patient's decision making abilities are not in question. . Patient was/were present in the room during visit. Goals of ACP Conversation:  Discuss advance care planning documents    Health Care Decision Makers:       Primary Decision Maker: Xochilt Izaiahjammies - [phone]  Summary:  Documented Next of Kin, per patient report    Advance Care Planning Documents (Patient Wishes):  Healthcare Power of /Advance Directive Appointment of Health Care Agent  Living Will/Advance Directive     Assessment:  Pt said he \"seem\" to have a document completed in the past. Pt's spouse coming to visit him today from work. Pt wants to ask his wife to bring document if they have one. Writer left a blank copy of document with pt should he not have one completed. Pt was given information on how to call [facility] health when either spouse brings document to be scanned into his medical records or should he want to complete a new one.     Interventions:  Provided education on documents for clarity and greater understanding  Reviewed but did not complete ACP document        Outcomes/Plan:  ACP Discussion: Postponed    Electronically signed by Chaplain Shaina on 11/8/2023 at 3:12 PM

## 2023-11-08 NOTE — CONSULTS
Gastroenterology H&P/Consult Note    Patient: Gene Segovia CSN: [id number]     YOB: 1955  Age: 77 y.o. Sex: male    Unit: [facility] TELE/MED/ONC Room/Bed: 0321/0321-01 Location: [address] [place] [address]     Admitting Physician: Emmanuel Jarvis    Date of  Admission: 11/7/2023   Admission type: Elective  Primary Care Physician: Reyes Peyer, MD         Chief Complaint: GI bleed  Consult Date: 11/8/2023     Subjective:     History of Present Illness: Gene Segovia is a 77 y.o. male who is seen at the request of Emmanuel Jarvis for post polypectomy bleeding. Patient with medical history of hyperlipidemia, hypertension, obesity (BMI 59.1), obstructive sleep apnea on CPAP, pulmonary embolism on Xarelto and iron deficiency anemia. Patient underwent colonoscopy with Dr. Magda Mena on 11/7/2023 for evaluation of iron deficiency anemia. Found to have a large broad-based pedunculated polyp in the sigmoid colon status post partial resection due to immediate severe bleeding requiring epinephrine injection and Hemospray application. Patient is admitted for post polypectomy bleeding. Offers no complaints. Denies abdominal pain, nausea, vomiting, fever, chills, hematochezia or melenic stools. Labs reviewed with hemoglobin 8.3, hematocrit 27.2, MCV 74.8. Platelets 231, WBC 6.4. Unremarkable liver test and BMP. Last Colonoscopy 11/7/2023: A large 4 cm pedunculated broad-based polyp in the sigmoid colon. Snare polypectomy was performed with only partial resection. There was significant immediate bleeding after resection; treated with epinephrine injection 9 mL of epinephrine. There was copious blood clots within this region. Hemospray was also used to help prevent delayed bleeding. There was no active bleeding at the end of the procedure.   Tattoos were placed on either side of the lesion    Past Medical History:   Diagnosis Date    Arthritis     knees    H/O gastric bypass 07/2014

## 2023-11-08 NOTE — CARE COORDINATION
Case Management Assessment  Initial Evaluation    Date/Time of Evaluation: 11/8/2023 2:30 PM  Assessment Completed by: ANAND Sykes    If patient is discharged prior to next notation, then this note serves as note for discharge by case management. Patient Name: Akash Webber                   YOB: 1955  Diagnosis: Iron deficiency anemia due to chronic blood loss [D50.0]  GIB (gastrointestinal bleeding) [K92.2]                   Date / Time: 11/7/2023 11:43 AM    Patient Admission Status: Inpatient   Readmission Risk (Low < 19, Mod (19-27), High > 27): Readmission Risk Score: 12.2    Current PCP: Anusha Florez MD  PCP verified by CM? Yes    Chart Reviewed: Yes      History Provided by: Patient  Patient Orientation: Alert and Oriented, Person, Place, Situation, Self    Patient Cognition: Alert    Hospitalization in the last 30 days (Readmission):  No      Advance Directives:      Code Status: Full Code   Patient's Primary Decision Maker is: Legal Next of Kin    Primary Decision MakerKeisha Rios Spouse - [phone]    Discharge Planning:    Patient lives with: Spouse/Significant Other Type of Home: House  Primary Care Giver: Self  Patient Support Systems include: Spouse/Significant Other   Current Financial resources: Other (Comment) (BCBS)  Current community resources: None  Current services prior to admission: Durable Medical Equipment            Current DME: Walker            Type of Home Care services:  None    ADLS  Prior functional level: Independent in ADLs/IADLs  Current functional level: Independent in ADLs/IADLs    PT AM-PAC: 23 /24  OT AM-PAC:   /24    Family can provide assistance at DC: Yes  Would you like Case Management to discuss the discharge plan with any other family members/significant others, and if so, who?  No  Plans to Return to Present Housing: Yes  Potential Assistance needed at discharge: N/A  Patient expects to discharge to: [facility] for

## 2023-11-08 NOTE — PLAN OF CARE
Problem: Safety - Adult  Goal: Free from fall injury  Outcome: Progressing  Flowsheets (Taken 11/8/2023 1133)  Free From Fall Injury:   Instruct family/caregiver on patient safety   Based on caregiver fall risk screen, instruct family/caregiver to ask for assistance with transferring infant if caregiver noted to have fall risk factors

## 2023-11-09 VITALS
RESPIRATION RATE: 18 BRPM | DIASTOLIC BLOOD PRESSURE: 76 MMHG | HEIGHT: 69 IN | HEART RATE: 83 BPM | OXYGEN SATURATION: 92 % | WEIGHT: 315 LBS | TEMPERATURE: 98.2 F | SYSTOLIC BLOOD PRESSURE: 139 MMHG | BODY MASS INDEX: 46.65 KG/M2

## 2023-11-09 LAB
ANION GAP SERPL CALCULATED.3IONS-SCNC: 3 MMOL/L (ref 3–16)
BUN SERPL-MCNC: 9 MG/DL (ref 7–20)
CALCIUM SERPL-MCNC: 8.6 MG/DL (ref 8.3–10.6)
CHLORIDE SERPL-SCNC: 102 MMOL/L (ref 99–110)
CO2 SERPL-SCNC: 34 MMOL/L (ref 21–32)
CREAT SERPL-MCNC: 0.8 MG/DL (ref 0.8–1.3)
GFR SERPLBLD CREATININE-BSD FMLA CKD-EPI: >60 ML/MIN/{1.73_M2}
GLUCOSE SERPL-MCNC: 93 MG/DL (ref 70–99)
HCT VFR BLD AUTO: 28.5 % (ref 40.5–52.5)
HCT VFR BLD AUTO: 28.8 % (ref 40.5–52.5)
HGB BLD-MCNC: 8.4 G/DL (ref 13.5–17.5)
HGB BLD-MCNC: 8.7 G/DL (ref 13.5–17.5)
POTASSIUM SERPL-SCNC: 4.7 MMOL/L (ref 3.5–5.1)
SODIUM SERPL-SCNC: 139 MMOL/L (ref 136–145)

## 2023-11-09 PROCEDURE — 85014 HEMATOCRIT: CPT

## 2023-11-09 PROCEDURE — 6370000000 HC RX 637 (ALT 250 FOR IP): Performed by: INTERNAL MEDICINE

## 2023-11-09 PROCEDURE — 6370000000 HC RX 637 (ALT 250 FOR IP): Performed by: STUDENT IN AN ORGANIZED HEALTH CARE EDUCATION/TRAINING PROGRAM

## 2023-11-09 PROCEDURE — 2580000003 HC RX 258: Performed by: STUDENT IN AN ORGANIZED HEALTH CARE EDUCATION/TRAINING PROGRAM

## 2023-11-09 PROCEDURE — 36415 COLL VENOUS BLD VENIPUNCTURE: CPT

## 2023-11-09 PROCEDURE — 99231 SBSQ HOSP IP/OBS SF/LOW 25: CPT | Performed by: SURGERY

## 2023-11-09 PROCEDURE — 80048 BASIC METABOLIC PNL TOTAL CA: CPT

## 2023-11-09 PROCEDURE — 85018 HEMOGLOBIN: CPT

## 2023-11-09 RX ORDER — POLYETHYLENE GLYCOL 3350 17 G/17G
17 POWDER, FOR SOLUTION ORAL DAILY PRN
Qty: 30 PACKET | Refills: 0 | Status: SHIPPED | OUTPATIENT
Start: 2023-11-09 | End: 2023-12-09

## 2023-11-09 RX ORDER — FERROUS SULFATE 325(65) MG
325 TABLET ORAL
Qty: 30 TABLET | Refills: 3 | Status: SHIPPED | OUTPATIENT
Start: 2023-11-10

## 2023-11-09 RX ORDER — ONDANSETRON 4 MG/1
4 TABLET, ORALLY DISINTEGRATING ORAL EVERY 8 HOURS PRN
Qty: 12 TABLET | Refills: 0 | Status: SHIPPED | OUTPATIENT
Start: 2023-11-09

## 2023-11-09 RX ADMIN — ACETAMINOPHEN 650 MG: 325 TABLET ORAL at 09:41

## 2023-11-09 RX ADMIN — FERROUS SULFATE TAB 325 MG (65 MG ELEMENTAL FE) 325 MG: 325 (65 FE) TAB at 09:41

## 2023-11-09 RX ADMIN — METOPROLOL SUCCINATE 25 MG: 25 TABLET, EXTENDED RELEASE ORAL at 09:41

## 2023-11-09 RX ADMIN — Medication 10 ML: at 09:41

## 2023-11-09 RX ADMIN — ISOSORBIDE MONONITRATE 30 MG: 30 TABLET, EXTENDED RELEASE ORAL at 09:41

## 2023-11-09 RX ADMIN — LISINOPRIL 40 MG: 20 TABLET ORAL at 09:41

## 2023-11-09 ASSESSMENT — PAIN DESCRIPTION - LOCATION: LOCATION: HEAD

## 2023-11-09 ASSESSMENT — PAIN DESCRIPTION - DESCRIPTORS: DESCRIPTORS: ACHING

## 2023-11-09 ASSESSMENT — PAIN SCALES - GENERAL: PAINLEVEL_OUTOF10: 2

## 2023-11-09 NOTE — PROGRESS NOTES
...4 Eyes Skin Assessment     NAME:  Gaby Blake  YOB: 1955  MEDICAL RECORD NUMBER:  [id number]    The patient is being assessed for  Shift Handoff    I agree that at least one RN has performed a thorough Head to Toe Skin Assessment on the patient. ALL assessment sites listed below have been assessed. Areas assessed by both nurses: Gwen RN and [facility], Face, Ears, Shoulders, Back, Chest, Arms, Elbows, Hands, Sacrum. Buttock, Coccyx, Ischium, and Legs. Feet and Heels        Does the Patient have a Wound?  Fissure/Cracking on the sacrum       Shane Prevention initiated by RN: Yes  Wound Care Orders initiated by RN: No    Pressure Injury (Stage 3,4, Unstageable, DTI, NWPT, and Complex wounds) if present, place Wound referral order by RN under : No    New Ostomies, if present place, Ostomy referral order under : No     Nurse 1 eSignature: Electronically signed by Laura Spence RN on 11/8/23 at 3:41 AM EST    **SHARE this note so that the co-signing nurse can place an eSignature**    Nurse 2 eSignature: Electronically signed by Minoo Vaca RN on 11/8/23 at 4:55 AM EST
11/08/23 1505   Encounter Summary   Encounter Overview/Reason  Spiritual/Emotional Needs; Rituals, Rites and Sacraments   Service Provided For: Patient   Referral/Consult From: Nurse   Support System Spouse   Last Encounter  11/08/23  (Pt called to receive Holy Communion, received plus rosary given to pt as requested)   Complexity of Encounter Moderate   Begin Time 1247   End Time  1313   Total Time Calculated 26 min   Rituals, Rites and Sacraments   Type Tenriism Communion  (Pt received Holy Communion as requested)   Assessment/Intervention/Outcome   Assessment Hopeful;Peaceful   Intervention Active listening;Discussed belief system/Orthodox practices/reji;Discussed relationship with God;Explored/Affirmed feelings, thoughts, concerns;Prayer (assurance of)/[place];Nurtured Hope   Outcome Encouraged;Engaged in conversation;Expressed Gratitude
1349 Received pt from endo in stable condition. VSS. Pt tolerating po intake. MD in to discuss results with pt. following procedure.     Patient to be admitted to hospital.    Report given to [place] Chikis
Awake and alert with no complaints. VS stable Meets PACU phase 2 discharge criteria. Waiting for transport. Will send to floor on portable O2 - oxygen tank at 826 PSI.
Date and time of surgery :    11/17/23 at 1300          Arrival Time:  1200     Bring Picture ID and insurance card. Please wear simple, loose fitting clothing to the hospital.   Delphine Goff not bring valuables (money, credit cards, checkbooks, etc.)   DO NOT wear any jewelry or piercings on day of surgery. All body piercing jewelry must be removed. If you have dentures, they will be removed before going to the OR; we will provide you a container. If you wear contact lenses or glasses, they will be removed; please bring a case for them. Shower the evening before or morning of surgery with antibacterial soap. Nothing to eat or drink after 800 am the morning of your procedure  You may brush your teeth and gargle the morning of surgery. DO NOT SWALLOW WATER. Take only Metoprolol and Isosorbide the morning of your procedure with a sip of water  Aspirin, Ibuprofen, Advil, Naproxen, Vitamin E and other Anti-inflammatory products and supplements should be stopped for 5 -7days before surgery or as directed by your physician. Stop Xarelto per cardiology recommendations. Last dose 11/4/23  Do not smoke or drink any alcoholic beverages 24 hours prior to surgery. This includes NA Beer. Refrain from the usage of any recreational drugs, including non-prescribed prescription drugs. You MUST plan for a responsible adult to stay on site while you are here and take you home after your surgery. You will not be allowed to leave alone or drive yourself home. It is strongly suggested someone stay with you the first 24 hrs. Your surgery will be cancelled if you do not have a ride home. To help prevent infection, change your sheets the night before surgery. If you  have a Living Will and Durable Power of  for Healthcare, please bring in a copy. Notify your Surgeon if you develop any illness between now and time of surgery.  Cough, cold, fever, sore throat, nausea, vomiting, etc.  Please notify your surgeon if you
Discharge: Pt discharged to home as per order. IV removed. Prescriptions & instructions reviewed. Pt verbalized understanding. Denied questions. Taken out to brother's vehicle via w\c in stable & ambulatory condition.
Katha Murrain    Age 77 y.o.    male    1955    N [id number]    11/7/2023  Arrival Time_____________  OR Time____________60 Cathleen Ifeanyi     Procedure(s):  COLONOSCOPY  ESOPHAGOGASTRODUODENOSCOPY WITH CAPSULE PLACEMENT                      General    Surgeon(s):  Triny Almonte, MD       Phone [phone] (home) [phone] (work)    [facility]  Cell         Work  _____________________________________________________________________  _____________________________________________________________________  _____________________________________________________________________  _____________________________________________________________________  _____________________________________________________________________    PCP _____________________________ Phone_________________     H&P  ________________  Bringing      Chart              Epic      DOS      Called________  EKG ________________   Bringing      Chart              Epic      DOS      Called________  LABS________________   Bringing     Chart              Epic      DOS      Called________  Cardiac Clearance ______ Bringing      Chart              Epic      DOS      Called________  Pulmonary Clearance____ Bringing      Chart              Epic      DOS      Called________    Cardiologist________________________ Phone___________________________  Pulmonologist_______________________Phone___________________________    ? Advance Directives   ? Anabaptism concerns / Waiver on Chart            PAT Communications________________  ? Pre-op Instructions Given [address]          _________________________________  ? Directions to Surgery Center                          _________________________________  ? Transportation Home_______________      __________________________________  ?  Crutches/Walker__________________        __________________________________    ________Pre-op Orders   _______Transcribed    _______Wt.  ________Pharmacy
Noted with O2 saturation 85-87%, patient denies any SOB - hooked to O2 support via NC at 1 lpm - O2 sat reading at 94-95%. Attached to Telemetry monitor as ordered- Tele box number 110.
Oriented pt to call light  (in patients reach) / verbalized understanding /demonstrates use  Bed position is in low   Side rails up X 2 /gurney locked
Physical Therapy  Facility/Department: [facility] C3 TELE/MED SURG/ONC  Physical Therapy Initial Assessment/Discharge Summary    Name: Raulito Madrigal  : 1955  MRN: [id number]  Date of Service: 2023    Discharge Recommendations:  Home with assist PRN   PT Equipment Recommendations  Equipment Needed: No      Patient Diagnosis(es): The encounter diagnosis was Iron deficiency anemia due to chronic blood loss. Past Medical History:  has a past medical history of Arthritis, H/O gastric bypass, Hyperlipidemia, Hypertension, Obesity, Panic attacks, PE (pulmonary embolism), and Sleep apnea. Past Surgical History:  has a past surgical history that includes Cholecystectomy (); Gastric bypass surgery (); hernia repair (2017); and Coronary stent placement (2023). Assessment   Assessment: Pt is a 77year old male admitted with GI bleed s/p colonoscopy. Pt able to complete bed mobility with mod I, functional transfers with IND, and ambulate up to 150ft with supervision without AD. Pt reports current ambulation is at baseline and reports he has no concerns regarding mobility and safety upon d/c home when medically ready. He has no acute PT deficits at this time therefore is d/c'd from PT services. Pt safe to d/c home with PRN assist when medically cleared. Therapy Prognosis: Good  Decision Making: Medium Complexity  Requires PT Follow-Up: No  Activity Tolerance  Activity Tolerance: Patient tolerated evaluation without incident     Plan   Physcial Therapy Plan  General Plan: Discharge with evaluation only  Safety Devices  Type of Devices:  All fall risk precautions in place, Call light within reach, Chair alarm in place, Patient at risk for falls, Left in chair, Nurse notified     Restrictions  Restrictions/Precautions  Restrictions/Precautions: Fall Risk, General Precautions  Position Activity Restriction  Other position/activity restrictions: up as tolerated, telemetry, NPO     Subjective   Pain:
Progress Note    Patient Francy Aranda  MRN: [id number]  YOB: 1955 Age: 77 y.o. Sex: male  Room: [address] [place] [address]       Admitting Physician: Charan Bryant MD   Date of Admission: 11/7/2023 11:43 AM   Primary Care Physician: Gwendolyn Gomez MD     Subjective:  Francy Aranda was seen and examined. We are following for sigmoid lesion, rectal bleeding. -- No acute events overnight   -- Denies abdominal pain or nausea/vomiting. Tolerating PO intake. -- No overt GI bleeding. Hgb stable. ROS:  Constitutional: Denies fever, no change in appetite  Respiratory: Denies cough or shortness of breath  Cardiovascular: Denies chest pain or edema    Objective:  Vital Signs:   Vitals:    11/09/23 0409   BP:    Pulse:    Resp: 18   Temp:    SpO2:          Physical Exam:  Constitutional: Alert and oriented x 4. No acute distress. HEENT: Sclera anicteric, mucosal membranes moist  Cardiovascular: Regular rate and rhythm. No murmurs. Respiratory: Respirations nonlabored, no crepitus  GI: Abdomen nondistended, soft, and nontender. Normal active bowel sounds. No masses palpable. Rectal: Deferred  Musculoskeletal:  No pitting edema of the lower legs. Neurological: Gross memory appears intact.       Intake/Output:    Intake/Output Summary (Last 24 hours) at 11/9/2023 1015  Last data filed at 11/8/2023 1714  Gross per 24 hour   Intake 500 ml   Output --   Net 500 ml        Current Medications:  Current Facility-Administered Medications   Medication Dose Route Frequency Provider Last Rate Last Admin    diatrizoate meglumine-sodium (GASTROGRAFIN) 66-10 % solution 12 mL  12 mL Oral ONCE PRN Rajani Meraz MD   12 mL at 11/08/23 1016    ferrous sulfate (IRON 325) tablet 325 mg  325 mg Oral Daily with breakfast Hamzah Muse MD   325 mg at 11/09/23 0941    atorvastatin (LIPITOR) tablet 40 mg  40 mg Oral Nightly Charan Bryant MD   40 mg at 11/08/23 2037    isosorbide mononitrate (IMDUR) extended
Pt. Sitting up in chair. Alert/oriented. Vitals and assessment stable as charted. Denies any pain/nausea or any other discomfort at present time. Call light in reach. S\O to bedside. Starting PO contrast now. Will continue to monitor.
Received patient in bed, alert and oriented/conversant. Vital signs taken and recorded. IV site patency assessed and without evidence of infiltration on his R hand. No signs of SOB - currently on RA. Skin assessment done. Noted with BLE edema +2 pitting. Vascular discoloration on bila legs. Due medications given as scheduled. Bed wheels locked; Call light within reached; Raised siderails x2; on Fall precaution. Denies any need at the moment.
Received patient on bed, alert and oriented x4. Vitals are stable. Complaints of headache 4/10, PRN Tylenol given. Currently on full liquid diet, tolerating well. Call bell within reach. Independent and calls out appropriately. Maintained a calm and comfortable environment. Shift assessment updated and documented.
Transferred to floor per cart pet transporter.
V2.0    [facility] Progress Note      Name:  Jhoan Garcia /Age/Sex: 1955  (77 y.o. male)   MRN & CSN:  [id number] & [id number] Encounter Date/Time: 2023 7:04 AM EST   [facility] PCP: Ana Mauro MD     Attending:Kamron Fernandez MD       Hospital Day: 2    Assessment and Recommendations   Jhoan Garcia is a 77 y.o. male with pmh of hypertension, pulmonary emboli, CAD, iron deficiency anemia who presented to the emergency department post colonoscopy who presents with GIB (gastrointestinal bleeding)      Plan:   GI bleed secondary to colonoscopy with partial resection  - A large 4 cm pedunculated broad-based polyp in the sigmoid colon noted on colonoscopy  - Bleeding after partial resection  - Cont to monitor H/H  - Transfuse is Hgb less than 7  - CT/Abd and pelvis per Gen Surgery due to possible malignancy  - GI Following    Anemia  - Continue to monitor H&H  - Appears controlled at this time  - GI Following    Hypertension  - Continue metoprolol succinate 25 mg  - Consider restarting lisinopril 20 mg home med    Pulmonary embolus history in   - History of pulmonary embolism  - Appears controlled  - Continue to monitor    CAD status post PCI  - History of CAD status post PCI  - Appears controlled    Iron deficiency anemia  - Continue to monitor H&H  - GI Following    Obesity  - Patient is obese  - Weight loss counseling appropriate  - Clear liquid diet    Diet Diet NPO   DVT Prophylaxis [] Lovenox, []  Heparin, [x] SCDs, [] Ambulation,  [] Eliquis, [] Xarelto  [] Coumadin   Code Status Full Code   Disposition From: Home  Expected Disposition: Home  Estimated Date of Discharge:   Patient requires continued admission due to bleeding   Surrogate Decision Maker/ POA Unknown     Personally reviewed Lab Studies and Imaging       Subjective:     Chief Complaint: GI bleed    Jhoan Garcia is a 77 y.o. male with pmh of hypertension, pulmonary emboli, CAD, iron deficiency
appropriate questions, and agrees with the plan.     Electronically signed by Saman Coffey MD

## 2023-11-09 NOTE — PLAN OF CARE
Problem: Safety - Adult  Goal: Free from fall injury  11/8/2023 2043 by Cory Gonzalez RN  Outcome: Progressing  Flowsheets (Taken 11/8/2023 1133 by Melissa Michelle RN)  Free From Fall Injury:   Instruct family/caregiver on patient safety   Based on caregiver fall risk screen, instruct family/caregiver to ask for assistance with transferring infant if caregiver noted to have fall risk factors     Problem: Pain  Goal: Verbalizes/displays adequate comfort level or baseline comfort level  Outcome: Progressing  Flowsheets (Taken 11/7/2023 1948 by Herman Ness RN)  Verbalizes/displays adequate comfort level or baseline comfort level:   Encourage patient to monitor pain and request assistance   Assess pain using appropriate pain scale   Implement non-pharmacological measures as appropriate and evaluate response     Problem: ABCDS Injury Assessment  Goal: Absence of physical injury  Outcome: Progressing  Flowsheets (Taken 11/7/2023 1948 by Herman Ness RN)  Absence of Physical Injury: Implement safety measures based on patient assessment

## 2023-11-09 NOTE — CARE COORDINATION
CASE MANAGEMENT DISCHARGE SUMMARY    Discharge to: Home with PRN family supports     Transportation: via private car    RN, name: Osiel Danielle RN    Note: Discharging nurse to complete SUKHJINDER, reconcile AVS, and place final copy with patient's discharge packet.    ANAND Diaz

## 2023-11-09 NOTE — DISCHARGE SUMMARY
V2.0  Discharge Summary    Name:  Juan Douglass /Age/Sex: 1955 (77 y.o. male)   Admit Date: 2023  Discharge Date: 23    MRN & CSN:  [id number] & [id number] Encounter Date and Time 23 1:02 PM EST    Attending:  Kamran Newton MD Discharging Provider: Renelle Kayser, DO       Hospital Course:     Brief HPI: Juan Douglass is a 77 y.o. male with pmh of hypertension, pulmonary emboli, CAD, iron deficiency anemia who presented to the emergency department post colonoscopy who presents with GIB (gastrointestinal bleeding)       Brief Problem Based Course:   GI bleed secondary to colonoscopy with partial resection  - A large 4 cm pedunculated broad-based polyp in the sigmoid colon noted on colonoscopy  - Bleeding after partial resection  - Follow-up outpatient with hemoglobin and hematocrit labs  - Transfuse is Hgb less than 7  - CT/Abd and pelvis per Gen Surgery due to possible malignancy  - Discussed the pathology findings with the patient, high risk precancerous but no active cancer at this time  - Per general surgery, ok to SC home with f/u in the next week to review pathology results and discuss surgical intervention  - GI recommends surgical resection given the degree of bleeding during colonoscopy and the pathology results     Anemia  - Follow-up outpatient with hemoglobin and hematocrit labs  - Appears controlled at this time  - Follow-up with PCP outpatient  -Follow-up with general surgery     Hypertension  - Continue metoprolol succinate 25 mg  - Continue lisinopril 40 mg     Pulmonary embolus history in   - History of pulmonary embolism  - Appears controlled  - Hold Xarelto until patient follows up with general surgery in 1 week  - Continue to monitor     CAD status post PCI  - History of CAD status post PCI  - Appears controlled     Iron deficiency anemia  - Follow-up outpatient with hemoglobin and hematocrit labs     Obesity  - Patient is obese  - Weight loss

## 2023-11-14 ENCOUNTER — INITIAL CONSULT (OUTPATIENT)
Dept: SURGERY | Age: 68
End: 2023-11-14
Payer: COMMERCIAL

## 2023-11-14 VITALS
WEIGHT: 315 LBS | SYSTOLIC BLOOD PRESSURE: 154 MMHG | HEIGHT: 69 IN | HEART RATE: 75 BPM | DIASTOLIC BLOOD PRESSURE: 79 MMHG | BODY MASS INDEX: 46.65 KG/M2

## 2023-11-14 DIAGNOSIS — D12.5 ADENOMATOUS POLYP OF SIGMOID COLON: Primary | ICD-10-CM

## 2023-11-14 PROCEDURE — 3078F DIAST BP <80 MM HG: CPT | Performed by: SURGERY

## 2023-11-14 PROCEDURE — 1123F ACP DISCUSS/DSCN MKR DOCD: CPT | Performed by: SURGERY

## 2023-11-14 PROCEDURE — 99214 OFFICE O/P EST MOD 30 MIN: CPT | Performed by: SURGERY

## 2023-11-14 PROCEDURE — 3077F SYST BP >= 140 MM HG: CPT | Performed by: SURGERY

## 2023-11-15 NOTE — PROGRESS NOTES
high-grade dysplasia. -Negative for invasive carcinoma    Assessment:  1. Adenomatous polyp of sigmoid colon      If this polyp can not be fully excised with benign histology, then a standard segmental sigmoid resection would be necessary. However, with his degree of severe obesity with large pendulous pannus, any method of surgery (open vs minimally invasive) will be technically challenging, and at risk for complications. Plan:   I will discuss case once more with Dr Ni Wood to ensure he doesn't think there might be an option to attempt completion endoscopic polypectomy. Patient has remained off his anticoagulation therapy for >1 week now, so it's possible he won't be as prone to any bleeding during resection. If they do not think further endoscopic resection is prudent, we will have to make final decision on whether to proceed with surgical resection or not. Patient indicates he understands these issues, asks appropriate questions, and agrees with the plan.

## 2023-11-17 ENCOUNTER — TELEPHONE (OUTPATIENT)
Dept: SURGERY | Age: 68
End: 2023-11-17

## 2023-11-17 NOTE — TELEPHONE ENCOUNTER
Per Dr Vani Bacon - Called pt - Spoke to pts wife (pt unavailable) - Scheduled pt for a Robotic Sigmoid Resection, Possible Open Procedure (General Anes) on Wed 12/27/23 @ 8:45am arrival 6:45am [facility] - Pt will need to do a bowel prep day before surgery (included in pts surgery instructions - see media) - Pt will be same-day-admit - Pt will need to be marked for surgery (ostomy nurse informed of this via email on 11/17/23) - Pt will need to continue to stay off Xarelto that wife informed pt temporarily stopped taking - Cardiac Clearance Request emailed to Dr Carole Ludwig @  (see media) - Dr Lorraine Gregorio to complete pre-op physical - Pts wife understood and agreed to above noted - Surgery instructions emailed to pt: [email or web address] (see media)

## 2023-12-12 RX ORDER — POTASSIUM CHLORIDE 1.5 G/1.58G
20 POWDER, FOR SOLUTION ORAL DAILY
COMMUNITY

## 2023-12-20 ENCOUNTER — HOSPITAL ENCOUNTER (OUTPATIENT)
Dept: PREADMISSION TESTING | Age: 68
Discharge: HOME | End: 2023-12-24
Payer: COMMERCIAL

## 2023-12-20 LAB
ABO + RH BLD: NORMAL
BLD GP AB SCN SERPL QL: NORMAL

## 2023-12-20 PROCEDURE — 86901 BLOOD TYPING SEROLOGIC RH(D): CPT

## 2023-12-20 PROCEDURE — 86850 RBC ANTIBODY SCREEN: CPT

## 2023-12-20 PROCEDURE — 36415 COLL VENOUS BLD VENIPUNCTURE: CPT

## 2023-12-20 PROCEDURE — 86900 BLOOD TYPING SEROLOGIC ABO: CPT

## 2023-12-20 RX ORDER — CHLORHEXIDINE GLUCONATE 213 G/1000ML
SOLUTION TOPICAL
Qty: 1 EACH | Refills: 0 | Status: SHIPPED | OUTPATIENT
Start: 2023-12-20 | End: 2024-01-03

## 2023-12-20 NOTE — CONSULTS
Consult for Ostomy marking    Met with patient Mansi Wiggins for ostomy marking in preparation for his surgery ROBOTIC SIGMOID RESECTION, POSSIBLE OPEN PROCEDURE on 12/27/23. We started with education of reviewing and instructing on basic GI anatomy and physiology. What a stoma is, and we reviewed pouching system, basic maintenance of an ostomy, and what to expect after the surgery. The patient wasn't informed of the possibility of an ostomy, so Samantha set up virtual meeting for him to discuss with Dr. Zuleyka Ivey prior to surgery. His abdomen was assessed supine, sitting, and standing. Patient has a large body habitus, with a large overhanging abdominal pannus. Creases noted and marked. The rectus muscle identified by having the patient cough. The patient wears his pants midway on the abdomen and above the umbilicus. 4 ostomy marks placed with a skin marker in all 4 quadrants of the abdomen within the rectus muscle. These sites were examined thoroughly for placement of the colostomy bag and wafer and will allow for a flat pouching surface that is in the patient's field of vision. The patient was left with the \"Ostomy home skills kit\" from the [facility], which included a skin marker. Pt verbalizes understanding to re-howard as the spots begin to fade. All questions answered. Patient verbalized understanding of today's session.

## 2023-12-26 ENCOUNTER — TELEMEDICINE (OUTPATIENT)
Dept: SURGERY | Age: 68
End: 2023-12-26
Payer: MEDICARE

## 2023-12-26 DIAGNOSIS — C18.7 MALIGNANT NEOPLASM OF SIGMOID COLON (HCC): Primary | ICD-10-CM

## 2023-12-26 PROCEDURE — 3017F COLORECTAL CA SCREEN DOC REV: CPT | Performed by: SURGERY

## 2023-12-26 PROCEDURE — 99212 OFFICE O/P EST SF 10 MIN: CPT | Performed by: SURGERY

## 2023-12-26 PROCEDURE — G8428 CUR MEDS NOT DOCUMENT: HCPCS | Performed by: SURGERY

## 2023-12-26 PROCEDURE — 1123F ACP DISCUSS/DSCN MKR DOCD: CPT | Performed by: SURGERY

## 2023-12-26 RX ORDER — SODIUM CHLORIDE 9 MG/ML
INJECTION, SOLUTION INTRAVENOUS PRN
Status: CANCELLED | OUTPATIENT
Start: 2023-12-26

## 2023-12-26 RX ORDER — SODIUM CHLORIDE 0.9 % (FLUSH) 0.9 %
5-40 SYRINGE (ML) INJECTION PRN
Status: CANCELLED | OUTPATIENT
Start: 2023-12-26

## 2023-12-26 RX ORDER — SODIUM CHLORIDE 0.9 % (FLUSH) 0.9 %
5-40 SYRINGE (ML) INJECTION EVERY 12 HOURS SCHEDULED
Status: CANCELLED | OUTPATIENT
Start: 2023-12-26

## 2023-12-26 NOTE — PROGRESS NOTES
Patsy Hdz (:  1955) is a 77 y.o. male,Established patient, here for evaluation of the following chief complaint(s):  No chief complaint on file. ASSESSMENT/PLAN:  1. Malignant neoplasm of sigmoid colon ([address])      Continue bowel prep  Present to hospital tomorrow for scheduled surgery         Subjective   SUBJECTIVE/OBJECTIVE:  HPI  Patient called with a few remaining questions in preparation for elective sigmoid resection of recently-discovered malignant polyp. He is otherwise feeling well, doing his bowel prep today. Had his ostomy marking done last week. We reviewed the prophylactic nature of this step, just in case there is a need for a diverting ostomy for some reason during the surgery. Patient indicates he understands these issues, asks appropriate questions, and agrees to proceed as planned. Review of Systems       Objective   Physical Exam       On this date 2023 I have spent 10 minutes reviewing previous notes, test results and face to face with the patient discussing the diagnosis and importance of compliance with the treatment plan as well as documenting on the day of the visit. An electronic signature was used to authenticate this note.     --Elmo Walton MD

## 2023-12-27 ENCOUNTER — ANESTHESIA (OUTPATIENT)
Dept: OPERATING ROOM | Age: 68
End: 2023-12-27
Payer: MEDICARE

## 2023-12-27 ENCOUNTER — APPOINTMENT (OUTPATIENT)
Dept: GENERAL RADIOLOGY | Age: 68
DRG: 329 | End: 2023-12-27
Attending: SURGERY
Payer: MEDICARE

## 2023-12-27 ENCOUNTER — HOSPITAL ENCOUNTER (INPATIENT)
Age: 68
LOS: 7 days | Discharge: NURSING, REHAB OR LONG-TERM CARE | DRG: 329 | End: 2024-01-03
Attending: SURGERY | Admitting: SURGERY
Payer: MEDICARE

## 2023-12-27 ENCOUNTER — ANESTHESIA EVENT (OUTPATIENT)
Dept: OPERATING ROOM | Age: 68
End: 2023-12-27
Payer: MEDICARE

## 2023-12-27 DIAGNOSIS — G89.18 POSTOPERATIVE PAIN: Primary | ICD-10-CM

## 2023-12-27 DIAGNOSIS — D12.5 ADENOMATOUS POLYP OF SIGMOID COLON: ICD-10-CM

## 2023-12-27 PROBLEM — Z90.49 S/P LAPAROSCOPIC-ASSISTED SIGMOIDECTOMY: Status: ACTIVE | Noted: 2023-12-27

## 2023-12-27 LAB
ABO + RH BLD: NORMAL
ANION GAP SERPL CALCULATED.3IONS-SCNC: 6 MMOL/L (ref 3–16)
BASE EXCESS BLDA CALC-SCNC: -1.3 MMOL/L (ref -3–3)
BASE EXCESS BLDA CALC-SCNC: -2.1 MMOL/L (ref -3–3)
BASE EXCESS BLDA CALC-SCNC: -3.4 MMOL/L (ref -3–3)
BLD GP AB SCN SERPL QL: NORMAL
BUN SERPL-MCNC: 17 MG/DL (ref 7–20)
CALCIUM SERPL-MCNC: 8.7 MG/DL (ref 8.3–10.6)
CHLORIDE SERPL-SCNC: 104 MMOL/L (ref 99–110)
CO2 BLDA-SCNC: 28 MMOL/L
CO2 BLDA-SCNC: 28.4 MMOL/L
CO2 BLDA-SCNC: 28.5 MMOL/L
CO2 SERPL-SCNC: 30 MMOL/L (ref 21–32)
COHGB MFR BLDA: 0.7 % (ref 0–1.5)
COHGB MFR BLDA: 0.8 % (ref 0–1.5)
COHGB MFR BLDA: 0.8 % (ref 0–1.5)
CREAT SERPL-MCNC: 0.8 MG/DL (ref 0.8–1.3)
GFR SERPLBLD CREATININE-BSD FMLA CKD-EPI: >60 ML/MIN/{1.73_M2}
GLUCOSE BLD-MCNC: 100 MG/DL (ref 70–99)
GLUCOSE BLD-MCNC: 103 MG/DL (ref 70–99)
GLUCOSE BLD-MCNC: 111 MG/DL (ref 70–99)
GLUCOSE BLD-MCNC: 126 MG/DL (ref 70–99)
GLUCOSE BLD-MCNC: 144 MG/DL (ref 70–99)
GLUCOSE SERPL-MCNC: 101 MG/DL (ref 70–99)
HCO3 BLDA-SCNC: 25.9 MMOL/L (ref 21–29)
HCO3 BLDA-SCNC: 26.6 MMOL/L (ref 21–29)
HCO3 BLDA-SCNC: 26.6 MMOL/L (ref 21–29)
HGB BLDA-MCNC: 13.3 G/DL (ref 13.5–17.5)
HGB BLDA-MCNC: 13.3 G/DL (ref 13.5–17.5)
HGB BLDA-MCNC: 13.8 G/DL (ref 13.5–17.5)
METHGB MFR BLDA: 0.6 %
METHGB MFR BLDA: 0.7 %
METHGB MFR BLDA: 0.9 %
O2 THERAPY: ABNORMAL
PCO2 BLDA: 58.8 MMHG (ref 35–45)
PCO2 BLDA: 63.6 MMHG (ref 35–45)
PCO2 BLDA: 67.9 MMHG (ref 35–45)
PERFORMED ON: ABNORMAL
PH BLDA: 7.2 [PH] (ref 7.35–7.45)
PH BLDA: 7.24 [PH] (ref 7.35–7.45)
PH BLDA: 7.27 [PH] (ref 7.35–7.45)
PO2 BLDA: 103.7 MMHG (ref 75–108)
PO2 BLDA: 98.1 MMHG (ref 75–108)
PO2 BLDA: 99.7 MMHG (ref 75–108)
POTASSIUM SERPL-SCNC: 4.5 MMOL/L (ref 3.5–5.1)
PROCALCITONIN SERPL IA-MCNC: 0.05 NG/ML (ref 0–0.15)
REASON FOR REJECTION: NORMAL
REJECTED TEST: NORMAL
SAO2 % BLDA: 95.4 %
SAO2 % BLDA: 96 %
SAO2 % BLDA: 97.2 %
SODIUM SERPL-SCNC: 140 MMOL/L (ref 136–145)

## 2023-12-27 PROCEDURE — 36600 WITHDRAWAL OF ARTERIAL BLOOD: CPT

## 2023-12-27 PROCEDURE — 6360000002 HC RX W HCPCS: Performed by: SURGERY

## 2023-12-27 PROCEDURE — 2500000003 HC RX 250 WO HCPCS: Performed by: NURSE ANESTHETIST, CERTIFIED REGISTERED

## 2023-12-27 PROCEDURE — 7100000000 HC PACU RECOVERY - FIRST 15 MIN: Performed by: SURGERY

## 2023-12-27 PROCEDURE — 2720000010 HC SURG SUPPLY STERILE: Performed by: SURGERY

## 2023-12-27 PROCEDURE — 8E0W4CZ ROBOTIC ASSISTED PROCEDURE OF TRUNK REGION, PERCUTANEOUS ENDOSCOPIC APPROACH: ICD-10-PCS | Performed by: SURGERY

## 2023-12-27 PROCEDURE — A4217 STERILE WATER/SALINE, 500 ML: HCPCS | Performed by: SURGERY

## 2023-12-27 PROCEDURE — 36415 COLL VENOUS BLD VENIPUNCTURE: CPT

## 2023-12-27 PROCEDURE — 6370000000 HC RX 637 (ALT 250 FOR IP): Performed by: SURGERY

## 2023-12-27 PROCEDURE — 44204 LAPARO PARTIAL COLECTOMY: CPT | Performed by: SURGERY

## 2023-12-27 PROCEDURE — 88307 TISSUE EXAM BY PATHOLOGIST: CPT

## 2023-12-27 PROCEDURE — 6360000002 HC RX W HCPCS: Performed by: NURSE ANESTHETIST, CERTIFIED REGISTERED

## 2023-12-27 PROCEDURE — 5A09557 ASSISTANCE WITH RESPIRATORY VENTILATION, GREATER THAN 96 CONSECUTIVE HOURS, CONTINUOUS POSITIVE AIRWAY PRESSURE: ICD-10-PCS | Performed by: SURGERY

## 2023-12-27 PROCEDURE — 7100000001 HC PACU RECOVERY - ADDTL 15 MIN: Performed by: SURGERY

## 2023-12-27 PROCEDURE — 86901 BLOOD TYPING SEROLOGIC RH(D): CPT

## 2023-12-27 PROCEDURE — 2580000003 HC RX 258: Performed by: NURSE ANESTHETIST, CERTIFIED REGISTERED

## 2023-12-27 PROCEDURE — 6360000002 HC RX W HCPCS: Performed by: ANESTHESIOLOGY

## 2023-12-27 PROCEDURE — 2060000000 HC ICU INTERMEDIATE R&B

## 2023-12-27 PROCEDURE — 3700000000 HC ANESTHESIA ATTENDED CARE: Performed by: SURGERY

## 2023-12-27 PROCEDURE — 94761 N-INVAS EAR/PLS OXIMETRY MLT: CPT

## 2023-12-27 PROCEDURE — 2500000003 HC RX 250 WO HCPCS: Performed by: SURGERY

## 2023-12-27 PROCEDURE — 86850 RBC ANTIBODY SCREEN: CPT

## 2023-12-27 PROCEDURE — 3600000009 HC SURGERY ROBOT BASE: Performed by: SURGERY

## 2023-12-27 PROCEDURE — 3600000019 HC SURGERY ROBOT ADDTL 15MIN: Performed by: SURGERY

## 2023-12-27 PROCEDURE — 51702 INSERT TEMP BLADDER CATH: CPT

## 2023-12-27 PROCEDURE — 82803 BLOOD GASES ANY COMBINATION: CPT

## 2023-12-27 PROCEDURE — 2580000003 HC RX 258: Performed by: SURGERY

## 2023-12-27 PROCEDURE — 2709999900 HC NON-CHARGEABLE SUPPLY: Performed by: SURGERY

## 2023-12-27 PROCEDURE — 94660 CPAP INITIATION&MGMT: CPT

## 2023-12-27 PROCEDURE — 80048 BASIC METABOLIC PNL TOTAL CA: CPT

## 2023-12-27 PROCEDURE — 86900 BLOOD TYPING SEROLOGIC ABO: CPT

## 2023-12-27 PROCEDURE — 0DTN4ZZ RESECTION OF SIGMOID COLON, PERCUTANEOUS ENDOSCOPIC APPROACH: ICD-10-PCS | Performed by: SURGERY

## 2023-12-27 PROCEDURE — S2900 ROBOTIC SURGICAL SYSTEM: HCPCS | Performed by: SURGERY

## 2023-12-27 PROCEDURE — 84145 PROCALCITONIN (PCT): CPT

## 2023-12-27 PROCEDURE — 3700000001 HC ADD 15 MINUTES (ANESTHESIA): Performed by: SURGERY

## 2023-12-27 PROCEDURE — 2700000000 HC OXYGEN THERAPY PER DAY

## 2023-12-27 PROCEDURE — 88305 TISSUE EXAM BY PATHOLOGIST: CPT

## 2023-12-27 PROCEDURE — 2580000003 HC RX 258: Performed by: ANESTHESIOLOGY

## 2023-12-27 PROCEDURE — 71045 X-RAY EXAM CHEST 1 VIEW: CPT

## 2023-12-27 RX ORDER — HEPARIN SODIUM 1000 [USP'U]/ML
INJECTION, SOLUTION INTRAVENOUS; SUBCUTANEOUS PRN
Status: DISCONTINUED | OUTPATIENT
Start: 2023-12-27 | End: 2023-12-27 | Stop reason: SDUPTHER

## 2023-12-27 RX ORDER — SODIUM CHLORIDE 0.9 % (FLUSH) 0.9 %
5-40 SYRINGE (ML) INJECTION PRN
Status: DISCONTINUED | OUTPATIENT
Start: 2023-12-27 | End: 2023-12-27 | Stop reason: HOSPADM

## 2023-12-27 RX ORDER — DEXAMETHASONE SODIUM PHOSPHATE 4 MG/ML
INJECTION, SOLUTION INTRA-ARTICULAR; INTRALESIONAL; INTRAMUSCULAR; INTRAVENOUS; SOFT TISSUE PRN
Status: DISCONTINUED | OUTPATIENT
Start: 2023-12-27 | End: 2023-12-27 | Stop reason: SDUPTHER

## 2023-12-27 RX ORDER — OXYCODONE HYDROCHLORIDE 5 MG/1
5 TABLET ORAL EVERY 4 HOURS PRN
Status: DISCONTINUED | OUTPATIENT
Start: 2023-12-27 | End: 2023-12-28

## 2023-12-27 RX ORDER — SODIUM CHLORIDE 0.9 % (FLUSH) 0.9 %
5-40 SYRINGE (ML) INJECTION PRN
Status: DISCONTINUED | OUTPATIENT
Start: 2023-12-27 | End: 2024-01-03 | Stop reason: HOSPADM

## 2023-12-27 RX ORDER — MAGNESIUM HYDROXIDE 1200 MG/15ML
LIQUID ORAL CONTINUOUS PRN
Status: COMPLETED | OUTPATIENT
Start: 2023-12-27 | End: 2023-12-27

## 2023-12-27 RX ORDER — METRONIDAZOLE 500 MG/100ML
500 INJECTION, SOLUTION INTRAVENOUS ONCE
Status: COMPLETED | OUTPATIENT
Start: 2023-12-27 | End: 2023-12-27

## 2023-12-27 RX ORDER — METOPROLOL SUCCINATE 25 MG/1
25 TABLET, EXTENDED RELEASE ORAL DAILY
Status: DISCONTINUED | OUTPATIENT
Start: 2023-12-28 | End: 2023-12-30

## 2023-12-27 RX ORDER — SODIUM CHLORIDE 9 MG/ML
INJECTION, SOLUTION INTRAVENOUS CONTINUOUS
Status: DISCONTINUED | OUTPATIENT
Start: 2023-12-27 | End: 2023-12-28

## 2023-12-27 RX ORDER — OXYCODONE HYDROCHLORIDE 5 MG/1
10 TABLET ORAL EVERY 4 HOURS PRN
Status: DISCONTINUED | OUTPATIENT
Start: 2023-12-27 | End: 2023-12-28

## 2023-12-27 RX ORDER — FENTANYL CITRATE 50 UG/ML
INJECTION, SOLUTION INTRAMUSCULAR; INTRAVENOUS PRN
Status: DISCONTINUED | OUTPATIENT
Start: 2023-12-27 | End: 2023-12-27 | Stop reason: SDUPTHER

## 2023-12-27 RX ORDER — PROPOFOL 10 MG/ML
INJECTION, EMULSION INTRAVENOUS PRN
Status: DISCONTINUED | OUTPATIENT
Start: 2023-12-27 | End: 2023-12-27 | Stop reason: SDUPTHER

## 2023-12-27 RX ORDER — MEPERIDINE HYDROCHLORIDE 50 MG/ML
12.5 INJECTION INTRAMUSCULAR; INTRAVENOUS; SUBCUTANEOUS EVERY 5 MIN PRN
Status: DISCONTINUED | OUTPATIENT
Start: 2023-12-27 | End: 2023-12-27 | Stop reason: HOSPADM

## 2023-12-27 RX ORDER — DIPHENHYDRAMINE HYDROCHLORIDE 50 MG/ML
12.5 INJECTION INTRAMUSCULAR; INTRAVENOUS
Status: DISCONTINUED | OUTPATIENT
Start: 2023-12-27 | End: 2023-12-27 | Stop reason: HOSPADM

## 2023-12-27 RX ORDER — MAGNESIUM SULFATE HEPTAHYDRATE 500 MG/ML
INJECTION, SOLUTION INTRAMUSCULAR; INTRAVENOUS PRN
Status: DISCONTINUED | OUTPATIENT
Start: 2023-12-27 | End: 2023-12-27 | Stop reason: SDUPTHER

## 2023-12-27 RX ORDER — ONDANSETRON 2 MG/ML
4 INJECTION INTRAMUSCULAR; INTRAVENOUS EVERY 6 HOURS PRN
Status: DISCONTINUED | OUTPATIENT
Start: 2023-12-27 | End: 2024-01-03 | Stop reason: HOSPADM

## 2023-12-27 RX ORDER — SODIUM CHLORIDE 9 MG/ML
INJECTION, SOLUTION INTRAVENOUS PRN
Status: DISCONTINUED | OUTPATIENT
Start: 2023-12-27 | End: 2023-12-27 | Stop reason: HOSPADM

## 2023-12-27 RX ORDER — ACETAMINOPHEN 325 MG/1
650 TABLET ORAL EVERY 4 HOURS PRN
Status: DISCONTINUED | OUTPATIENT
Start: 2023-12-27 | End: 2024-01-03 | Stop reason: HOSPADM

## 2023-12-27 RX ORDER — SODIUM CHLORIDE 0.9 % (FLUSH) 0.9 %
5-40 SYRINGE (ML) INJECTION EVERY 12 HOURS SCHEDULED
Status: DISCONTINUED | OUTPATIENT
Start: 2023-12-27 | End: 2024-01-03 | Stop reason: HOSPADM

## 2023-12-27 RX ORDER — FAMOTIDINE 20 MG/1
20 TABLET, FILM COATED ORAL 2 TIMES DAILY
Status: DISCONTINUED | OUTPATIENT
Start: 2023-12-27 | End: 2024-01-03 | Stop reason: HOSPADM

## 2023-12-27 RX ORDER — ENOXAPARIN SODIUM 100 MG/ML
60 INJECTION SUBCUTANEOUS 2 TIMES DAILY
Status: DISCONTINUED | OUTPATIENT
Start: 2023-12-27 | End: 2024-01-03 | Stop reason: HOSPADM

## 2023-12-27 RX ORDER — FAMOTIDINE 10 MG/ML
20 INJECTION, SOLUTION INTRAVENOUS 2 TIMES DAILY
Status: DISCONTINUED | OUTPATIENT
Start: 2023-12-27 | End: 2024-01-03 | Stop reason: HOSPADM

## 2023-12-27 RX ORDER — SODIUM CHLORIDE 9 MG/ML
INJECTION, SOLUTION INTRAVENOUS PRN
Status: DISCONTINUED | OUTPATIENT
Start: 2023-12-27 | End: 2024-01-03 | Stop reason: HOSPADM

## 2023-12-27 RX ORDER — PHENYLEPHRINE HCL IN 0.9% NACL 1 MG/10 ML
SYRINGE (ML) INTRAVENOUS PRN
Status: DISCONTINUED | OUTPATIENT
Start: 2023-12-27 | End: 2023-12-27 | Stop reason: SDUPTHER

## 2023-12-27 RX ORDER — ONDANSETRON 2 MG/ML
INJECTION INTRAMUSCULAR; INTRAVENOUS PRN
Status: DISCONTINUED | OUTPATIENT
Start: 2023-12-27 | End: 2023-12-27 | Stop reason: SDUPTHER

## 2023-12-27 RX ORDER — HYDROMORPHONE HYDROCHLORIDE 1 MG/ML
0.5 INJECTION, SOLUTION INTRAMUSCULAR; INTRAVENOUS; SUBCUTANEOUS
Status: DISCONTINUED | OUTPATIENT
Start: 2023-12-27 | End: 2023-12-28

## 2023-12-27 RX ORDER — ISOSORBIDE MONONITRATE 30 MG/1
30 TABLET, EXTENDED RELEASE ORAL DAILY
Status: DISCONTINUED | OUTPATIENT
Start: 2023-12-28 | End: 2024-01-03 | Stop reason: HOSPADM

## 2023-12-27 RX ORDER — SUCCINYLCHOLINE CHLORIDE 20 MG/ML
INJECTION INTRAMUSCULAR; INTRAVENOUS PRN
Status: DISCONTINUED | OUTPATIENT
Start: 2023-12-27 | End: 2023-12-27 | Stop reason: SDUPTHER

## 2023-12-27 RX ORDER — MAGNESIUM HYDROXIDE/ALUMINUM HYDROXICE/SIMETHICONE 120; 1200; 1200 MG/30ML; MG/30ML; MG/30ML
15 SUSPENSION ORAL EVERY 6 HOURS PRN
Status: DISCONTINUED | OUTPATIENT
Start: 2023-12-27 | End: 2024-01-03 | Stop reason: HOSPADM

## 2023-12-27 RX ORDER — KETAMINE HCL IN NACL, ISO-OSM 100MG/10ML
SYRINGE (ML) INJECTION PRN
Status: DISCONTINUED | OUTPATIENT
Start: 2023-12-27 | End: 2023-12-27 | Stop reason: SDUPTHER

## 2023-12-27 RX ORDER — ONDANSETRON 4 MG/1
4 TABLET, ORALLY DISINTEGRATING ORAL EVERY 8 HOURS PRN
Status: DISCONTINUED | OUTPATIENT
Start: 2023-12-27 | End: 2024-01-03 | Stop reason: HOSPADM

## 2023-12-27 RX ORDER — OXYCODONE HYDROCHLORIDE 5 MG/1
5 TABLET ORAL PRN
Status: DISCONTINUED | OUTPATIENT
Start: 2023-12-27 | End: 2023-12-27 | Stop reason: HOSPADM

## 2023-12-27 RX ORDER — INDOCYANINE GREEN AND WATER 25 MG
KIT INJECTION PRN
Status: DISCONTINUED | OUTPATIENT
Start: 2023-12-27 | End: 2023-12-27 | Stop reason: SDUPTHER

## 2023-12-27 RX ORDER — SODIUM CHLORIDE, SODIUM LACTATE, POTASSIUM CHLORIDE, CALCIUM CHLORIDE 600; 310; 30; 20 MG/100ML; MG/100ML; MG/100ML; MG/100ML
INJECTION, SOLUTION INTRAVENOUS CONTINUOUS
Status: DISCONTINUED | OUTPATIENT
Start: 2023-12-27 | End: 2023-12-27

## 2023-12-27 RX ORDER — ONDANSETRON 2 MG/ML
4 INJECTION INTRAMUSCULAR; INTRAVENOUS
Status: DISCONTINUED | OUTPATIENT
Start: 2023-12-27 | End: 2023-12-27 | Stop reason: HOSPADM

## 2023-12-27 RX ORDER — GLYCOPYRROLATE 0.2 MG/ML
INJECTION INTRAMUSCULAR; INTRAVENOUS PRN
Status: DISCONTINUED | OUTPATIENT
Start: 2023-12-27 | End: 2023-12-27 | Stop reason: SDUPTHER

## 2023-12-27 RX ORDER — LIDOCAINE HYDROCHLORIDE 10 MG/ML
2 INJECTION, SOLUTION INFILTRATION; PERINEURAL
Status: DISCONTINUED | OUTPATIENT
Start: 2023-12-27 | End: 2023-12-27 | Stop reason: HOSPADM

## 2023-12-27 RX ORDER — BUPIVACAINE HYDROCHLORIDE 5 MG/ML
INJECTION, SOLUTION EPIDURAL; INTRACAUDAL PRN
Status: DISCONTINUED | OUTPATIENT
Start: 2023-12-27 | End: 2023-12-27 | Stop reason: ALTCHOICE

## 2023-12-27 RX ORDER — LIDOCAINE HYDROCHLORIDE 20 MG/ML
INJECTION, SOLUTION INFILTRATION; PERINEURAL PRN
Status: DISCONTINUED | OUTPATIENT
Start: 2023-12-27 | End: 2023-12-27 | Stop reason: SDUPTHER

## 2023-12-27 RX ORDER — ROCURONIUM BROMIDE 10 MG/ML
INJECTION, SOLUTION INTRAVENOUS PRN
Status: DISCONTINUED | OUTPATIENT
Start: 2023-12-27 | End: 2023-12-27 | Stop reason: SDUPTHER

## 2023-12-27 RX ORDER — LABETALOL HYDROCHLORIDE 5 MG/ML
5 INJECTION, SOLUTION INTRAVENOUS EVERY 10 MIN PRN
Status: DISCONTINUED | OUTPATIENT
Start: 2023-12-27 | End: 2023-12-27 | Stop reason: HOSPADM

## 2023-12-27 RX ORDER — OXYCODONE HYDROCHLORIDE 5 MG/1
10 TABLET ORAL PRN
Status: DISCONTINUED | OUTPATIENT
Start: 2023-12-27 | End: 2023-12-27 | Stop reason: HOSPADM

## 2023-12-27 RX ORDER — HYDROMORPHONE HYDROCHLORIDE 1 MG/ML
0.25 INJECTION, SOLUTION INTRAMUSCULAR; INTRAVENOUS; SUBCUTANEOUS
Status: DISCONTINUED | OUTPATIENT
Start: 2023-12-27 | End: 2023-12-28

## 2023-12-27 RX ORDER — SODIUM CHLORIDE 0.9 % (FLUSH) 0.9 %
5-40 SYRINGE (ML) INJECTION EVERY 12 HOURS SCHEDULED
Status: DISCONTINUED | OUTPATIENT
Start: 2023-12-27 | End: 2023-12-27 | Stop reason: HOSPADM

## 2023-12-27 RX ADMIN — OXYCODONE HYDROCHLORIDE 10 MG: 5 TABLET ORAL at 22:22

## 2023-12-27 RX ADMIN — ROCURONIUM BROMIDE 50 MG: 50 INJECTION, SOLUTION INTRAVENOUS at 11:18

## 2023-12-27 RX ADMIN — Medication 10 ML: at 19:59

## 2023-12-27 RX ADMIN — HEPARIN SODIUM 5000 UNITS: 1000 INJECTION, SOLUTION INTRAVENOUS; SUBCUTANEOUS at 09:05

## 2023-12-27 RX ADMIN — ONDANSETRON 4 MG: 2 INJECTION INTRAMUSCULAR; INTRAVENOUS at 13:22

## 2023-12-27 RX ADMIN — SUGAMMADEX 400 MG: 100 INJECTION, SOLUTION INTRAVENOUS at 13:21

## 2023-12-27 RX ADMIN — Medication 100 MCG: at 12:45

## 2023-12-27 RX ADMIN — HYDROMORPHONE HYDROCHLORIDE 0.5 MG: 1 INJECTION, SOLUTION INTRAMUSCULAR; INTRAVENOUS; SUBCUTANEOUS at 14:12

## 2023-12-27 RX ADMIN — Medication 10 MG: at 10:56

## 2023-12-27 RX ADMIN — SUCCINYLCHOLINE CHLORIDE 140 MG: 20 INJECTION, SOLUTION INTRAMUSCULAR; INTRAVENOUS at 09:12

## 2023-12-27 RX ADMIN — SODIUM CHLORIDE: 9 INJECTION, SOLUTION INTRAVENOUS at 19:00

## 2023-12-27 RX ADMIN — LIDOCAINE HYDROCHLORIDE 100 MG: 20 INJECTION, SOLUTION INFILTRATION; PERINEURAL at 09:12

## 2023-12-27 RX ADMIN — SODIUM CHLORIDE, SODIUM LACTATE, POTASSIUM CHLORIDE, AND CALCIUM CHLORIDE: .6; .31; .03; .02 INJECTION, SOLUTION INTRAVENOUS at 09:04

## 2023-12-27 RX ADMIN — FENTANYL CITRATE 100 MCG: 50 INJECTION, SOLUTION INTRAMUSCULAR; INTRAVENOUS at 09:12

## 2023-12-27 RX ADMIN — FAMOTIDINE 20 MG: 10 INJECTION, SOLUTION INTRAVENOUS at 19:59

## 2023-12-27 RX ADMIN — HYDROMORPHONE HYDROCHLORIDE 0.5 MG: 1 INJECTION, SOLUTION INTRAMUSCULAR; INTRAVENOUS; SUBCUTANEOUS at 15:15

## 2023-12-27 RX ADMIN — ROCURONIUM BROMIDE 50 MG: 50 INJECTION, SOLUTION INTRAVENOUS at 10:10

## 2023-12-27 RX ADMIN — PROPOFOL 150 MG: 10 INJECTION, EMULSION INTRAVENOUS at 09:12

## 2023-12-27 RX ADMIN — HYDROMORPHONE HYDROCHLORIDE 0.5 MG: 1 INJECTION, SOLUTION INTRAMUSCULAR; INTRAVENOUS; SUBCUTANEOUS at 14:23

## 2023-12-27 RX ADMIN — Medication 10 MG: at 12:21

## 2023-12-27 RX ADMIN — ROCURONIUM BROMIDE 10 MG: 50 INJECTION, SOLUTION INTRAVENOUS at 09:12

## 2023-12-27 RX ADMIN — HYDROMORPHONE HYDROCHLORIDE 0.5 MG: 1 INJECTION, SOLUTION INTRAMUSCULAR; INTRAVENOUS; SUBCUTANEOUS at 14:36

## 2023-12-27 RX ADMIN — Medication 20 MG: at 09:39

## 2023-12-27 RX ADMIN — MAGNESIUM SULFATE HEPTAHYDRATE 2 G: 500 INJECTION, SOLUTION INTRAMUSCULAR; INTRAVENOUS at 09:46

## 2023-12-27 RX ADMIN — Medication 3000 MG: at 09:16

## 2023-12-27 RX ADMIN — DEXAMETHASONE SODIUM PHOSPHATE 6 MG: 4 INJECTION, SOLUTION INTRAMUSCULAR; INTRAVENOUS at 10:16

## 2023-12-27 RX ADMIN — HYDROMORPHONE HYDROCHLORIDE 0.5 MG: 1 INJECTION, SOLUTION INTRAMUSCULAR; INTRAVENOUS; SUBCUTANEOUS at 18:27

## 2023-12-27 RX ADMIN — Medication 200 MCG: at 09:34

## 2023-12-27 RX ADMIN — INDOCYANINE GREEN AND WATER 5 MG: KIT at 12:05

## 2023-12-27 RX ADMIN — PHENYLEPHRINE HYDROCHLORIDE 10 MCG/MIN: 10 INJECTION INTRAVENOUS at 09:34

## 2023-12-27 RX ADMIN — ENOXAPARIN SODIUM 60 MG: 100 INJECTION SUBCUTANEOUS at 19:59

## 2023-12-27 RX ADMIN — Medication 3000 MG: at 13:17

## 2023-12-27 RX ADMIN — METHOCARBAMOL 1 G: 100 INJECTION INTRAMUSCULAR; INTRAVENOUS at 12:43

## 2023-12-27 RX ADMIN — INDOCYANINE GREEN AND WATER 5 MG: KIT at 11:43

## 2023-12-27 RX ADMIN — GLYCOPYRROLATE 0.2 MG: 0.2 INJECTION, SOLUTION INTRAMUSCULAR; INTRAVENOUS at 09:56

## 2023-12-27 RX ADMIN — ROCURONIUM BROMIDE 40 MG: 50 INJECTION, SOLUTION INTRAVENOUS at 09:23

## 2023-12-27 RX ADMIN — MEPERIDINE HYDROCHLORIDE 12.5 MG: 50 INJECTION, SOLUTION INTRAMUSCULAR; INTRAVENOUS; SUBCUTANEOUS at 15:52

## 2023-12-27 RX ADMIN — METRONIDAZOLE 500 MG: 500 INJECTION, SOLUTION INTRAVENOUS at 09:21

## 2023-12-27 ASSESSMENT — PAIN - FUNCTIONAL ASSESSMENT
PAIN_FUNCTIONAL_ASSESSMENT: PREVENTS OR INTERFERES WITH MANY ACTIVE NOT PASSIVE ACTIVITIES
PAIN_FUNCTIONAL_ASSESSMENT: ACTIVITIES ARE NOT PREVENTED
PAIN_FUNCTIONAL_ASSESSMENT: NONE - DENIES PAIN
PAIN_FUNCTIONAL_ASSESSMENT: PREVENTS OR INTERFERES WITH MANY ACTIVE NOT PASSIVE ACTIVITIES
PAIN_FUNCTIONAL_ASSESSMENT: PREVENTS OR INTERFERES WITH MANY ACTIVE NOT PASSIVE ACTIVITIES

## 2023-12-27 ASSESSMENT — PAIN DESCRIPTION - LOCATION
LOCATION: ABDOMEN

## 2023-12-27 ASSESSMENT — PAIN DESCRIPTION - ORIENTATION
ORIENTATION: MID
ORIENTATION: MID
ORIENTATION: INNER
ORIENTATION: MID
ORIENTATION: RIGHT;LEFT
ORIENTATION: MID

## 2023-12-27 ASSESSMENT — PAIN DESCRIPTION - DESCRIPTORS
DESCRIPTORS: ACHING;SORE
DESCRIPTORS: ACHING
DESCRIPTORS: ACHING;SORE

## 2023-12-27 ASSESSMENT — PAIN SCALES - GENERAL
PAINLEVEL_OUTOF10: 8
PAINLEVEL_OUTOF10: 7
PAINLEVEL_OUTOF10: 8

## 2023-12-27 NOTE — BRIEF OP NOTE
Brief Postoperative Note      Patient: Ernesto Parnell  YOB: 1955  MRN: [id number]    Date of Procedure: 12/27/2023    Pre-Op Diagnosis Codes:     * Adenomatous polyp of sigmoid colon [D12.5]    Post-Op Diagnosis:  Malignant polyp, sigmoid colon       Procedure(s):  ROBOTIC SIGMOID COLON RESECTION    Surgeon(s):  Yoel Mcclain MD    Assistant:  Surgical Assistant: Bill Shannon    Anesthesia: General    Estimated Blood Loss (mL): less than 50     Complications: None    Specimens:   ID Type Source Tests Collected by Time Destination   A : DISTAL DONUT Tissue Tissue SURGICAL PATHOLOGY Mcclain, Yoel Ibarra MD 12/27/2023 1306    B : SIGMOID COLON SUTURE IN PROXIMAL MARGIN Tissue Tissue SURGICAL PATHOLOGY Mcclain, Yoel Ibarra MD 12/27/2023 1307    C : PROXIMAL DONUT Tissue Tissue SURGICAL PATHOLOGY Mcclain, Yoel Ibarra MD 12/27/2023 1309        Implants:  * No implants in log *      Drains:   Urinary Catheter 12/27/23 2 Way (Active)       Findings: 1)Recurrent umbilical hernia w/ incarcerated omentum, reduced           2) mid/distal sigmoid polyp site w/ tattoo ink just proximal and distal           3) robotic sigmoidectomy w/ end/side colorectal stapled anastomosis, no leak w/ insufflation test      Job#: [id number]    Colon Resection  Operation performed with curative intent Yes   Tumor Location (select all that apply) Sigmoid colon   Extent of colon and vascular resection (select all that apply) Sigmoid resection - inferior mesenteric          Electronically signed by YOEL MCCLAIN MD on 12/27/2023 at 4:58 PM

## 2023-12-27 NOTE — ANESTHESIA POSTPROCEDURE EVALUATION
Department of Anesthesiology  Postprocedure Note    Patient: Barbara Montana  MRN: [id number]  YOB: 1955  Date of evaluation: 12/27/2023    Procedure Summary       Date: 12/27/23 Room / Location: [address] [place] [address] / [address] [place] [address]    Anesthesia Start: 4576 Anesthesia Stop: 8996    Procedure: ROBOTIC SIGMOID COLON RESECTION (Abdomen) Diagnosis:       Adenomatous polyp of sigmoid colon      (Adenomatous polyp of sigmoid colon [D12.5])    Surgeons: April Dejesus MD Responsible Provider: Tatiana Michelle MD    Anesthesia Type: general ASA Status: 4            Anesthesia Type: No value filed. Jeff Phase I: Jeff Score: 8    Jeff Phase II:      Anesthesia Post Evaluation    Patient location during evaluation: PACU  Level of consciousness: awake  Airway patency: patent  Nausea & Vomiting: no vomiting  Cardiovascular status: blood pressure returned to baseline  Respiratory status: acceptable  Hydration status: stable  Pain management: adequate    No notable events documented.

## 2023-12-28 LAB
ALBUMIN SERPL-MCNC: 3.2 G/DL (ref 3.4–5)
ALBUMIN/GLOB SERPL: 1.2 {RATIO} (ref 1.1–2.2)
ALP SERPL-CCNC: 76 U/L (ref 40–129)
ALT SERPL-CCNC: 8 U/L (ref 10–40)
ANION GAP SERPL CALCULATED.3IONS-SCNC: 5 MMOL/L (ref 3–16)
ANION GAP SERPL CALCULATED.3IONS-SCNC: 6 MMOL/L (ref 3–16)
AST SERPL-CCNC: 18 U/L (ref 15–37)
BASE EXCESS BLDV CALC-SCNC: -1 MMOL/L (ref -3–3)
BASE EXCESS BLDV CALC-SCNC: -2.5 MMOL/L (ref -3–3)
BASOPHILS # BLD: 0 K/UL (ref 0–0.2)
BASOPHILS # BLD: 0 K/UL (ref 0–0.2)
BASOPHILS NFR BLD: 0.2 %
BASOPHILS NFR BLD: 0.2 %
BILIRUB SERPL-MCNC: 0.6 MG/DL (ref 0–1)
BUN SERPL-MCNC: 22 MG/DL (ref 7–20)
BUN SERPL-MCNC: 26 MG/DL (ref 7–20)
CALCIUM SERPL-MCNC: 8.1 MG/DL (ref 8.3–10.6)
CALCIUM SERPL-MCNC: 8.3 MG/DL (ref 8.3–10.6)
CHLORIDE SERPL-SCNC: 100 MMOL/L (ref 99–110)
CHLORIDE SERPL-SCNC: 103 MMOL/L (ref 99–110)
CO2 BLDV-SCNC: 27 MMOL/L
CO2 BLDV-SCNC: 28 MMOL/L
CO2 SERPL-SCNC: 26 MMOL/L (ref 21–32)
CO2 SERPL-SCNC: 27 MMOL/L (ref 21–32)
COHGB MFR BLDV: 2.3 % (ref 0–1.5)
COHGB MFR BLDV: 3.1 % (ref 0–1.5)
CREAT SERPL-MCNC: 1.1 MG/DL (ref 0.8–1.3)
CREAT SERPL-MCNC: 1.3 MG/DL (ref 0.8–1.3)
DEPRECATED RDW RBC AUTO: 19.9 % (ref 12.4–15.4)
DEPRECATED RDW RBC AUTO: 19.9 % (ref 12.4–15.4)
EOSINOPHIL # BLD: 0 K/UL (ref 0–0.6)
EOSINOPHIL # BLD: 0 K/UL (ref 0–0.6)
EOSINOPHIL NFR BLD: 0.1 %
EOSINOPHIL NFR BLD: 0.3 %
GFR SERPLBLD CREATININE-BSD FMLA CKD-EPI: 59 ML/MIN/{1.73_M2}
GFR SERPLBLD CREATININE-BSD FMLA CKD-EPI: >60 ML/MIN/{1.73_M2}
GLUCOSE SERPL-MCNC: 130 MG/DL (ref 70–99)
GLUCOSE SERPL-MCNC: 141 MG/DL (ref 70–99)
HCO3 BLDV-SCNC: 24.9 MMOL/L (ref 23–29)
HCO3 BLDV-SCNC: 26.6 MMOL/L (ref 23–29)
HCT VFR BLD AUTO: 35.9 % (ref 40.5–52.5)
HCT VFR BLD AUTO: 37.4 % (ref 40.5–52.5)
HGB BLD-MCNC: 10.9 G/DL (ref 13.5–17.5)
HGB BLD-MCNC: 11.5 G/DL (ref 13.5–17.5)
LYMPHOCYTES # BLD: 0.7 K/UL (ref 1–5.1)
LYMPHOCYTES # BLD: 0.7 K/UL (ref 1–5.1)
LYMPHOCYTES NFR BLD: 5 %
LYMPHOCYTES NFR BLD: 6.9 %
MCH RBC QN AUTO: 24.9 PG (ref 26–34)
MCH RBC QN AUTO: 25 PG (ref 26–34)
MCHC RBC AUTO-ENTMCNC: 30.2 G/DL (ref 31–36)
MCHC RBC AUTO-ENTMCNC: 30.7 G/DL (ref 31–36)
MCV RBC AUTO: 81.1 FL (ref 80–100)
MCV RBC AUTO: 82.7 FL (ref 80–100)
METHGB MFR BLDV: 0.3 %
METHGB MFR BLDV: 0.6 %
MONOCYTES # BLD: 0.6 K/UL (ref 0–1.3)
MONOCYTES # BLD: 0.8 K/UL (ref 0–1.3)
MONOCYTES NFR BLD: 5.8 %
MONOCYTES NFR BLD: 6 %
NEUTROPHILS # BLD: 12.1 K/UL (ref 1.7–7.7)
NEUTROPHILS # BLD: 8.3 K/UL (ref 1.7–7.7)
NEUTROPHILS NFR BLD: 86.8 %
NEUTROPHILS NFR BLD: 88.7 %
NT-PROBNP SERPL-MCNC: 601 PG/ML (ref 0–124)
O2 THERAPY: ABNORMAL
O2 THERAPY: ABNORMAL
PCO2 BLDV: 53.9 MMHG (ref 40–50)
PCO2 BLDV: 58.4 MMHG (ref 40–50)
PH BLDV: 7.28 [PH] (ref 7.35–7.45)
PH BLDV: 7.28 [PH] (ref 7.35–7.45)
PLATELET # BLD AUTO: 138 K/UL (ref 135–450)
PLATELET # BLD AUTO: 158 K/UL (ref 135–450)
PMV BLD AUTO: 6.8 FL (ref 5–10.5)
PMV BLD AUTO: 7 FL (ref 5–10.5)
PO2 BLDV: 58.4 MMHG (ref 25–40)
PO2 BLDV: 73.5 MMHG (ref 25–40)
POTASSIUM SERPL-SCNC: 4.9 MMOL/L (ref 3.5–5.1)
POTASSIUM SERPL-SCNC: 5.3 MMOL/L (ref 3.5–5.1)
PROT SERPL-MCNC: 5.8 G/DL (ref 6.4–8.2)
RBC # BLD AUTO: 4.34 M/UL (ref 4.2–5.9)
RBC # BLD AUTO: 4.61 M/UL (ref 4.2–5.9)
REASON FOR REJECTION: NORMAL
REJECTED TEST: NORMAL
SAO2 % BLDV: 87 %
SAO2 % BLDV: 94 %
SODIUM SERPL-SCNC: 133 MMOL/L (ref 136–145)
SODIUM SERPL-SCNC: 134 MMOL/L (ref 136–145)
WBC # BLD AUTO: 13.6 K/UL (ref 4–11)
WBC # BLD AUTO: 9.6 K/UL (ref 4–11)

## 2023-12-28 PROCEDURE — 51702 INSERT TEMP BLADDER CATH: CPT

## 2023-12-28 PROCEDURE — 97166 OT EVAL MOD COMPLEX 45 MIN: CPT

## 2023-12-28 PROCEDURE — 2060000000 HC ICU INTERMEDIATE R&B

## 2023-12-28 PROCEDURE — 99024 POSTOP FOLLOW-UP VISIT: CPT | Performed by: SURGERY

## 2023-12-28 PROCEDURE — 36415 COLL VENOUS BLD VENIPUNCTURE: CPT

## 2023-12-28 PROCEDURE — 36600 WITHDRAWAL OF ARTERIAL BLOOD: CPT

## 2023-12-28 PROCEDURE — 2580000003 HC RX 258: Performed by: SURGERY

## 2023-12-28 PROCEDURE — 6360000002 HC RX W HCPCS: Performed by: STUDENT IN AN ORGANIZED HEALTH CARE EDUCATION/TRAINING PROGRAM

## 2023-12-28 PROCEDURE — 83880 ASSAY OF NATRIURETIC PEPTIDE: CPT

## 2023-12-28 PROCEDURE — 6370000000 HC RX 637 (ALT 250 FOR IP): Performed by: SURGERY

## 2023-12-28 PROCEDURE — 82803 BLOOD GASES ANY COMBINATION: CPT

## 2023-12-28 PROCEDURE — 6360000002 HC RX W HCPCS: Performed by: SURGERY

## 2023-12-28 PROCEDURE — 94660 CPAP INITIATION&MGMT: CPT

## 2023-12-28 PROCEDURE — 51798 US URINE CAPACITY MEASURE: CPT

## 2023-12-28 PROCEDURE — 97162 PT EVAL MOD COMPLEX 30 MIN: CPT

## 2023-12-28 PROCEDURE — 97530 THERAPEUTIC ACTIVITIES: CPT

## 2023-12-28 PROCEDURE — 2500000003 HC RX 250 WO HCPCS: Performed by: SURGERY

## 2023-12-28 PROCEDURE — 97535 SELF CARE MNGMENT TRAINING: CPT

## 2023-12-28 PROCEDURE — 6370000000 HC RX 637 (ALT 250 FOR IP): Performed by: STUDENT IN AN ORGANIZED HEALTH CARE EDUCATION/TRAINING PROGRAM

## 2023-12-28 PROCEDURE — 94761 N-INVAS EAR/PLS OXIMETRY MLT: CPT

## 2023-12-28 PROCEDURE — 2700000000 HC OXYGEN THERAPY PER DAY

## 2023-12-28 PROCEDURE — 85025 COMPLETE CBC W/AUTO DIFF WBC: CPT

## 2023-12-28 PROCEDURE — 80053 COMPREHEN METABOLIC PANEL: CPT

## 2023-12-28 RX ORDER — OXYCODONE HYDROCHLORIDE 5 MG/1
10 TABLET ORAL EVERY 4 HOURS PRN
Status: DISCONTINUED | OUTPATIENT
Start: 2023-12-28 | End: 2023-12-28

## 2023-12-28 RX ORDER — SIMETHICONE 80 MG
80 TABLET,CHEWABLE ORAL EVERY 6 HOURS PRN
Status: DISCONTINUED | OUTPATIENT
Start: 2023-12-28 | End: 2024-01-03 | Stop reason: HOSPADM

## 2023-12-28 RX ORDER — HYDROCODONE BITARTRATE AND ACETAMINOPHEN 5; 325 MG/1; MG/1
1 TABLET ORAL EVERY 6 HOURS PRN
Status: DISCONTINUED | OUTPATIENT
Start: 2023-12-28 | End: 2024-01-03 | Stop reason: HOSPADM

## 2023-12-28 RX ORDER — FUROSEMIDE 10 MG/ML
20 INJECTION INTRAMUSCULAR; INTRAVENOUS ONCE
Status: COMPLETED | OUTPATIENT
Start: 2023-12-28 | End: 2023-12-28

## 2023-12-28 RX ORDER — OXYCODONE HYDROCHLORIDE 5 MG/1
5 TABLET ORAL EVERY 6 HOURS PRN
Status: DISCONTINUED | OUTPATIENT
Start: 2023-12-28 | End: 2023-12-28

## 2023-12-28 RX ADMIN — Medication 10 ML: at 20:14

## 2023-12-28 RX ADMIN — ISOSORBIDE MONONITRATE 30 MG: 30 TABLET, EXTENDED RELEASE ORAL at 07:51

## 2023-12-28 RX ADMIN — FAMOTIDINE 20 MG: 20 TABLET, FILM COATED ORAL at 07:51

## 2023-12-28 RX ADMIN — METOPROLOL SUCCINATE 25 MG: 25 TABLET, EXTENDED RELEASE ORAL at 07:51

## 2023-12-28 RX ADMIN — OXYCODONE HYDROCHLORIDE 10 MG: 5 TABLET ORAL at 07:51

## 2023-12-28 RX ADMIN — OXYCODONE HYDROCHLORIDE 10 MG: 5 TABLET ORAL at 02:22

## 2023-12-28 RX ADMIN — ENOXAPARIN SODIUM 60 MG: 100 INJECTION SUBCUTANEOUS at 07:50

## 2023-12-28 RX ADMIN — ENOXAPARIN SODIUM 60 MG: 100 INJECTION SUBCUTANEOUS at 20:14

## 2023-12-28 RX ADMIN — HYDROMORPHONE HYDROCHLORIDE 0.5 MG: 1 INJECTION, SOLUTION INTRAMUSCULAR; INTRAVENOUS; SUBCUTANEOUS at 09:18

## 2023-12-28 RX ADMIN — HYDROCODONE BITARTRATE AND ACETAMINOPHEN 1 TABLET: 5; 325 TABLET ORAL at 21:37

## 2023-12-28 RX ADMIN — OXYCODONE HYDROCHLORIDE 10 MG: 5 TABLET ORAL at 13:09

## 2023-12-28 RX ADMIN — FUROSEMIDE 20 MG: 10 INJECTION, SOLUTION INTRAMUSCULAR; INTRAVENOUS at 21:37

## 2023-12-28 RX ADMIN — Medication 10 ML: at 07:51

## 2023-12-28 RX ADMIN — SODIUM CHLORIDE: 9 INJECTION, SOLUTION INTRAVENOUS at 11:17

## 2023-12-28 RX ADMIN — SODIUM CHLORIDE: 9 INJECTION, SOLUTION INTRAVENOUS at 03:08

## 2023-12-28 RX ADMIN — FAMOTIDINE 20 MG: 10 INJECTION, SOLUTION INTRAVENOUS at 20:14

## 2023-12-28 ASSESSMENT — PAIN DESCRIPTION - LOCATION
LOCATION: ABDOMEN
LOCATION: ABDOMEN

## 2023-12-28 ASSESSMENT — PAIN DESCRIPTION - PAIN TYPE: TYPE: SURGICAL PAIN

## 2023-12-28 ASSESSMENT — PAIN DESCRIPTION - ORIENTATION
ORIENTATION: RIGHT;LEFT
ORIENTATION: RIGHT;LEFT

## 2023-12-28 ASSESSMENT — PAIN DESCRIPTION - DESCRIPTORS
DESCRIPTORS: ACHING
DESCRIPTORS: ACHING;PRESSURE

## 2023-12-28 ASSESSMENT — PAIN - FUNCTIONAL ASSESSMENT
PAIN_FUNCTIONAL_ASSESSMENT: ACTIVITIES ARE NOT PREVENTED
PAIN_FUNCTIONAL_ASSESSMENT: ACTIVITIES ARE NOT PREVENTED

## 2023-12-28 ASSESSMENT — PAIN SCALES - GENERAL
PAINLEVEL_OUTOF10: 7
PAINLEVEL_OUTOF10: 6
PAINLEVEL_OUTOF10: 7

## 2023-12-28 NOTE — OP NOTE
[address] [address]                                OPERATIVE REPORT    PATIENT NAME: CHADD NIEVES                  :        1955  MED REC NO:   [id number]                          ROOM:  ACCOUNT NO:   [id number]                           ADMIT DATE: 2023  PROVIDER:     Yoel Mcclain MD     DATE OF PROCEDURE:  2023    PREOPERATIVE DIAGNOSIS:  Malignant polyp of the sigmoid colon.    POSTOPERATIVE DIAGNOSIS:  Malignant polyp of the sigmoid colon.    PROCEDURE PERFORMED:  Robotic sigmoidectomy with stapled colorectal  anastomosis.    SURGEON:  Yoel Mcclain MD    ANESTHESIA:  General.    ESTIMATED BLOOD LOSS:  Less than 50 mL.    SPECIMENS:  1.  Distal anastomotic donut.  2.  Sigmoid colon, stitch equals proximal margin.  3.  Proximal anastomotic donut.    COUNT:  Sponge and needle count were correct.    INDICATIONS:  The patient is a 68-year-old male previously presented  with a lower GI bleed episode.  Workup including endoscopy identified  approximately 4 cm broad flat polypoid lesion in the sigmoid colon and  approximately 25 cm.  The polyp revealed  areas of high-grade dysplasia suggestive of developing malignant  histology.  Due to the difficulty in excising the polyp endoscopically,  it was recommended that a formal surgical resection be applied.    FINDINGS:  1.  Recurrent umbilical hernia with incarcerated omentum, reduced.  2.  Mid to distal sigmoid polyp site with tattoo ink noted both proximal  and distal to the tumor.  3.  Robotic sigmoidectomy with end-to-side colorectal stapled  anastomosis, with no leak seen on insufflation.    DESCRIPTION OF PROCEDURE:  After informed consent was obtained, the  patient was taken to the operating room and placed in a supine position.  The patient had preoperative markings on his abdomen for possible  diverting ostomy sites if needed.  He was placed under

## 2023-12-28 NOTE — H&P
I have reviewed the history and physical and examined the patient.  I find no relevant changes.  I have reviewed with the patient and/or family members, during the preoperative office visit the risks, benefits, and alternatives to the procedure.    SHAHAB GAMINO MD

## 2023-12-28 NOTE — PLAN OF CARE
Problem: Discharge Planning  Goal: Discharge to home or other facility with appropriate resources  Outcome: Progressing     Problem: Safety - Adult  Goal: Free from fall injury  Outcome: Progressing     Problem: ABCDS Injury Assessment  Goal: Absence of physical injury  Outcome: Progressing     Problem: Pain  Goal: Verbalizes/displays adequate comfort level or baseline comfort level  Outcome: Progressing

## 2023-12-29 LAB
ANION GAP SERPL CALCULATED.3IONS-SCNC: 6 MMOL/L (ref 3–16)
BASE EXCESS BLDA CALC-SCNC: -0.7 MMOL/L (ref -3–3)
BASOPHILS # BLD: 0 K/UL (ref 0–0.2)
BASOPHILS NFR BLD: 0.2 %
BUN SERPL-MCNC: 29 MG/DL (ref 7–20)
CALCIUM SERPL-MCNC: 7.9 MG/DL (ref 8.3–10.6)
CHLORIDE SERPL-SCNC: 101 MMOL/L (ref 99–110)
CO2 BLDA-SCNC: 27.4 MMOL/L
CO2 SERPL-SCNC: 26 MMOL/L (ref 21–32)
COHGB MFR BLDA: 0.8 % (ref 0–1.5)
CREAT SERPL-MCNC: 1.4 MG/DL (ref 0.8–1.3)
DEPRECATED RDW RBC AUTO: 19.7 % (ref 12.4–15.4)
EOSINOPHIL # BLD: 0 K/UL (ref 0–0.6)
EOSINOPHIL NFR BLD: 0.5 %
GFR SERPLBLD CREATININE-BSD FMLA CKD-EPI: 54 ML/MIN/{1.73_M2}
GLUCOSE SERPL-MCNC: 109 MG/DL (ref 70–99)
HCO3 BLDA-SCNC: 25.8 MMOL/L (ref 21–29)
HCT VFR BLD AUTO: 35.2 % (ref 40.5–52.5)
HGB BLD-MCNC: 10.8 G/DL (ref 13.5–17.5)
HGB BLDA-MCNC: 11.8 G/DL (ref 13.5–17.5)
LYMPHOCYTES # BLD: 0.7 K/UL (ref 1–5.1)
LYMPHOCYTES NFR BLD: 7.2 %
MAGNESIUM SERPL-MCNC: 2.1 MG/DL (ref 1.8–2.4)
MCH RBC QN AUTO: 25 PG (ref 26–34)
MCHC RBC AUTO-ENTMCNC: 30.6 G/DL (ref 31–36)
MCV RBC AUTO: 81.8 FL (ref 80–100)
METHGB MFR BLDA: 0.5 %
MONOCYTES # BLD: 0.6 K/UL (ref 0–1.3)
MONOCYTES NFR BLD: 6.8 %
NEUTROPHILS # BLD: 8.1 K/UL (ref 1.7–7.7)
NEUTROPHILS NFR BLD: 85.3 %
O2 THERAPY: ABNORMAL
PCO2 BLDA: 50.9 MMHG (ref 35–45)
PH BLDA: 7.32 [PH] (ref 7.35–7.45)
PHOSPHATE SERPL-MCNC: 2.6 MG/DL (ref 2.5–4.9)
PLATELET # BLD AUTO: 126 K/UL (ref 135–450)
PMV BLD AUTO: 7.4 FL (ref 5–10.5)
PO2 BLDA: 95.4 MMHG (ref 75–108)
POTASSIUM SERPL-SCNC: 4.6 MMOL/L (ref 3.5–5.1)
RBC # BLD AUTO: 4.3 M/UL (ref 4.2–5.9)
SAO2 % BLDA: 96.8 %
SODIUM SERPL-SCNC: 133 MMOL/L (ref 136–145)
SODIUM UR-SCNC: <20 MMOL/L
WBC # BLD AUTO: 9.5 K/UL (ref 4–11)

## 2023-12-29 PROCEDURE — 36415 COLL VENOUS BLD VENIPUNCTURE: CPT

## 2023-12-29 PROCEDURE — 99024 POSTOP FOLLOW-UP VISIT: CPT | Performed by: SURGERY

## 2023-12-29 PROCEDURE — 2060000000 HC ICU INTERMEDIATE R&B

## 2023-12-29 PROCEDURE — 2580000003 HC RX 258: Performed by: SURGERY

## 2023-12-29 PROCEDURE — 36600 WITHDRAWAL OF ARTERIAL BLOOD: CPT

## 2023-12-29 PROCEDURE — 84100 ASSAY OF PHOSPHORUS: CPT

## 2023-12-29 PROCEDURE — 94761 N-INVAS EAR/PLS OXIMETRY MLT: CPT

## 2023-12-29 PROCEDURE — 80048 BASIC METABOLIC PNL TOTAL CA: CPT

## 2023-12-29 PROCEDURE — 51798 US URINE CAPACITY MEASURE: CPT

## 2023-12-29 PROCEDURE — 6370000000 HC RX 637 (ALT 250 FOR IP): Performed by: STUDENT IN AN ORGANIZED HEALTH CARE EDUCATION/TRAINING PROGRAM

## 2023-12-29 PROCEDURE — 85025 COMPLETE CBC W/AUTO DIFF WBC: CPT

## 2023-12-29 PROCEDURE — 94660 CPAP INITIATION&MGMT: CPT

## 2023-12-29 PROCEDURE — 82803 BLOOD GASES ANY COMBINATION: CPT

## 2023-12-29 PROCEDURE — 2700000000 HC OXYGEN THERAPY PER DAY

## 2023-12-29 PROCEDURE — 83735 ASSAY OF MAGNESIUM: CPT

## 2023-12-29 PROCEDURE — 6370000000 HC RX 637 (ALT 250 FOR IP): Performed by: SURGERY

## 2023-12-29 PROCEDURE — 6360000002 HC RX W HCPCS: Performed by: SURGERY

## 2023-12-29 PROCEDURE — 83935 ASSAY OF URINE OSMOLALITY: CPT

## 2023-12-29 PROCEDURE — 51702 INSERT TEMP BLADDER CATH: CPT

## 2023-12-29 PROCEDURE — 84300 ASSAY OF URINE SODIUM: CPT

## 2023-12-29 PROCEDURE — 2500000003 HC RX 250 WO HCPCS: Performed by: SURGERY

## 2023-12-29 RX ADMIN — FAMOTIDINE 20 MG: 20 TABLET, FILM COATED ORAL at 09:38

## 2023-12-29 RX ADMIN — HYDROCODONE BITARTRATE AND ACETAMINOPHEN 1 TABLET: 5; 325 TABLET ORAL at 05:31

## 2023-12-29 RX ADMIN — HYDROCODONE BITARTRATE AND ACETAMINOPHEN 1 TABLET: 5; 325 TABLET ORAL at 13:55

## 2023-12-29 RX ADMIN — HYDROCODONE BITARTRATE AND ACETAMINOPHEN 1 TABLET: 5; 325 TABLET ORAL at 20:27

## 2023-12-29 RX ADMIN — ONDANSETRON 4 MG: 2 INJECTION INTRAMUSCULAR; INTRAVENOUS at 16:45

## 2023-12-29 RX ADMIN — ENOXAPARIN SODIUM 60 MG: 100 INJECTION SUBCUTANEOUS at 20:27

## 2023-12-29 RX ADMIN — FAMOTIDINE 20 MG: 10 INJECTION, SOLUTION INTRAVENOUS at 20:27

## 2023-12-29 RX ADMIN — ENOXAPARIN SODIUM 60 MG: 100 INJECTION SUBCUTANEOUS at 09:38

## 2023-12-29 RX ADMIN — Medication 10 ML: at 09:47

## 2023-12-29 ASSESSMENT — PAIN DESCRIPTION - ORIENTATION
ORIENTATION: MID
ORIENTATION: RIGHT;LEFT

## 2023-12-29 ASSESSMENT — PAIN DESCRIPTION - LOCATION
LOCATION: ABDOMEN
LOCATION: ABDOMEN

## 2023-12-29 ASSESSMENT — PAIN - FUNCTIONAL ASSESSMENT: PAIN_FUNCTIONAL_ASSESSMENT: ACTIVITIES ARE NOT PREVENTED

## 2023-12-29 ASSESSMENT — PAIN DESCRIPTION - DESCRIPTORS
DESCRIPTORS: DISCOMFORT
DESCRIPTORS: ACHING

## 2023-12-29 ASSESSMENT — PAIN SCALES - GENERAL
PAINLEVEL_OUTOF10: 7
PAINLEVEL_OUTOF10: 7
PAINLEVEL_OUTOF10: 0

## 2023-12-29 NOTE — PLAN OF CARE
Problem: Discharge Planning  Goal: Discharge to home or other facility with appropriate resources  Outcome: Progressing     Problem: Safety - Adult  Goal: Free from fall injury  12/28/2023 2224 by Gissell Villalobos, RN  Outcome: Progressing  12/28/2023 0853 by Milagros Gustafson, RN  Outcome: Progressing     Problem: ABCDS Injury Assessment  Goal: Absence of physical injury  Outcome: Progressing     Problem: Pain  Goal: Verbalizes/displays adequate comfort level or baseline comfort level  Outcome: Progressing     Problem: Skin/Tissue Integrity  Goal: Absence of new skin breakdown  Description: 1.  Monitor for areas of redness and/or skin breakdown  2.  Assess vascular access sites hourly  3.  Every 4-6 hours minimum:  Change oxygen saturation probe site  4.  Every 4-6 hours:  If on nasal continuous positive airway pressure, respiratory therapy assess nares and determine need for appliance change or resting period.  Outcome: Progressing

## 2023-12-29 NOTE — FLOWSHEET NOTE
12/29/23 0825   Vital Signs   BP 97/61     Patient with lower bp, notified JEANNIE Emanuel to hold am bp meds at this time, will continue to monitor.

## 2023-12-30 LAB
ANION GAP SERPL CALCULATED.3IONS-SCNC: 4 MMOL/L (ref 3–16)
BASE EXCESS BLDV CALC-SCNC: 2.4 MMOL/L (ref -3–3)
BUN SERPL-MCNC: 21 MG/DL (ref 7–20)
CALCIUM SERPL-MCNC: 8.2 MG/DL (ref 8.3–10.6)
CHLORIDE SERPL-SCNC: 103 MMOL/L (ref 99–110)
CO2 BLDV-SCNC: 33 MMOL/L
CO2 SERPL-SCNC: 30 MMOL/L (ref 21–32)
COHGB MFR BLDV: 2.4 % (ref 0–1.5)
CREAT SERPL-MCNC: 1 MG/DL (ref 0.8–1.3)
DEPRECATED RDW RBC AUTO: 18.6 % (ref 12.4–15.4)
GFR SERPLBLD CREATININE-BSD FMLA CKD-EPI: >60 ML/MIN/{1.73_M2}
GLUCOSE SERPL-MCNC: 122 MG/DL (ref 70–99)
HCO3 BLDV-SCNC: 30.6 MMOL/L (ref 23–29)
HCT VFR BLD AUTO: 35.3 % (ref 40.5–52.5)
HGB BLD-MCNC: 10.8 G/DL (ref 13.5–17.5)
MCH RBC QN AUTO: 24.8 PG (ref 26–34)
MCHC RBC AUTO-ENTMCNC: 30.5 G/DL (ref 31–36)
MCV RBC AUTO: 81.2 FL (ref 80–100)
METHGB MFR BLDV: 0.4 %
NT-PROBNP SERPL-MCNC: 944 PG/ML (ref 0–124)
O2 THERAPY: ABNORMAL
OSMOLALITY UR: 416 MOSM/KG (ref 390–1070)
PCO2 BLDV: 65.3 MMHG (ref 40–50)
PH BLDV: 7.29 [PH] (ref 7.35–7.45)
PLATELET # BLD AUTO: 136 K/UL (ref 135–450)
PMV BLD AUTO: 6.6 FL (ref 5–10.5)
PO2 BLDV: 42.5 MMHG (ref 25–40)
POTASSIUM SERPL-SCNC: 4.6 MMOL/L (ref 3.5–5.1)
RBC # BLD AUTO: 4.35 M/UL (ref 4.2–5.9)
SAO2 % BLDV: 76 %
SODIUM SERPL-SCNC: 137 MMOL/L (ref 136–145)
WBC # BLD AUTO: 8.4 K/UL (ref 4–11)

## 2023-12-30 PROCEDURE — 2580000003 HC RX 258: Performed by: SURGERY

## 2023-12-30 PROCEDURE — 82803 BLOOD GASES ANY COMBINATION: CPT

## 2023-12-30 PROCEDURE — 80048 BASIC METABOLIC PNL TOTAL CA: CPT

## 2023-12-30 PROCEDURE — 2060000000 HC ICU INTERMEDIATE R&B

## 2023-12-30 PROCEDURE — 94761 N-INVAS EAR/PLS OXIMETRY MLT: CPT

## 2023-12-30 PROCEDURE — 6370000000 HC RX 637 (ALT 250 FOR IP): Performed by: STUDENT IN AN ORGANIZED HEALTH CARE EDUCATION/TRAINING PROGRAM

## 2023-12-30 PROCEDURE — 99024 POSTOP FOLLOW-UP VISIT: CPT | Performed by: SURGERY

## 2023-12-30 PROCEDURE — 94660 CPAP INITIATION&MGMT: CPT

## 2023-12-30 PROCEDURE — 83880 ASSAY OF NATRIURETIC PEPTIDE: CPT

## 2023-12-30 PROCEDURE — 6360000002 HC RX W HCPCS: Performed by: SURGERY

## 2023-12-30 PROCEDURE — 6370000000 HC RX 637 (ALT 250 FOR IP): Performed by: SURGERY

## 2023-12-30 PROCEDURE — 36415 COLL VENOUS BLD VENIPUNCTURE: CPT

## 2023-12-30 PROCEDURE — 85027 COMPLETE CBC AUTOMATED: CPT

## 2023-12-30 PROCEDURE — 2700000000 HC OXYGEN THERAPY PER DAY

## 2023-12-30 PROCEDURE — 6370000000 HC RX 637 (ALT 250 FOR IP): Performed by: HOSPITALIST

## 2023-12-30 RX ORDER — METOPROLOL SUCCINATE 50 MG/1
50 TABLET, EXTENDED RELEASE ORAL DAILY
Status: DISCONTINUED | OUTPATIENT
Start: 2023-12-31 | End: 2024-01-03 | Stop reason: HOSPADM

## 2023-12-30 RX ORDER — METOPROLOL SUCCINATE 25 MG/1
25 TABLET, EXTENDED RELEASE ORAL ONCE
Status: COMPLETED | OUTPATIENT
Start: 2023-12-30 | End: 2023-12-30

## 2023-12-30 RX ADMIN — FAMOTIDINE 20 MG: 20 TABLET, FILM COATED ORAL at 08:38

## 2023-12-30 RX ADMIN — HYDROCODONE BITARTRATE AND ACETAMINOPHEN 1 TABLET: 5; 325 TABLET ORAL at 13:42

## 2023-12-30 RX ADMIN — HYDROCODONE BITARTRATE AND ACETAMINOPHEN 1 TABLET: 5; 325 TABLET ORAL at 05:57

## 2023-12-30 RX ADMIN — METOPROLOL SUCCINATE 25 MG: 25 TABLET, EXTENDED RELEASE ORAL at 12:05

## 2023-12-30 RX ADMIN — Medication 10 ML: at 20:06

## 2023-12-30 RX ADMIN — ACETAMINOPHEN 650 MG: 325 TABLET ORAL at 12:05

## 2023-12-30 RX ADMIN — ENOXAPARIN SODIUM 60 MG: 100 INJECTION SUBCUTANEOUS at 08:38

## 2023-12-30 RX ADMIN — Medication 10 ML: at 08:38

## 2023-12-30 RX ADMIN — ENOXAPARIN SODIUM 60 MG: 100 INJECTION SUBCUTANEOUS at 20:05

## 2023-12-30 RX ADMIN — FAMOTIDINE 20 MG: 20 TABLET, FILM COATED ORAL at 20:06

## 2023-12-30 RX ADMIN — METOPROLOL SUCCINATE 25 MG: 25 TABLET, EXTENDED RELEASE ORAL at 08:38

## 2023-12-30 ASSESSMENT — PAIN DESCRIPTION - LOCATION
LOCATION: ABDOMEN
LOCATION: ABDOMEN

## 2023-12-30 ASSESSMENT — PAIN SCALES - GENERAL
PAINLEVEL_OUTOF10: 4
PAINLEVEL_OUTOF10: 0
PAINLEVEL_OUTOF10: 0
PAINLEVEL_OUTOF10: 1
PAINLEVEL_OUTOF10: 8
PAINLEVEL_OUTOF10: 0

## 2023-12-30 ASSESSMENT — PAIN SCALES - WONG BAKER
WONGBAKER_NUMERICALRESPONSE: 0
WONGBAKER_NUMERICALRESPONSE: 0

## 2023-12-30 ASSESSMENT — PAIN DESCRIPTION - ORIENTATION: ORIENTATION: RIGHT

## 2023-12-30 ASSESSMENT — PAIN DESCRIPTION - DESCRIPTORS: DESCRIPTORS: ACHING

## 2023-12-30 NOTE — PLAN OF CARE
Problem: Discharge Planning  Goal: Discharge to home or other facility with appropriate resources  Outcome: Progressing     Problem: Safety - Adult  Goal: Free from fall injury  Outcome: Progressing     Problem: ABCDS Injury Assessment  Goal: Absence of physical injury  Outcome: Progressing     Problem: Pain  Goal: Verbalizes/displays adequate comfort level or baseline comfort level  Outcome: Progressing     Problem: Skin/Tissue Integrity  Goal: Absence of new skin breakdown  Description: 1.  Monitor for areas of redness and/or skin breakdown  2.  Assess vascular access sites hourly  3.  Every 4-6 hours minimum:  Change oxygen saturation probe site  4.  Every 4-6 hours:  If on nasal continuous positive airway pressure, respiratory therapy assess nares and determine need for appliance change or resting period.  Outcome: Progressing

## 2023-12-31 LAB
BASE EXCESS BLDA CALC-SCNC: 0.8 MMOL/L (ref -3–3)
CO2 BLDA-SCNC: 29.8 MMOL/L
COHGB MFR BLDA: 1.2 % (ref 0–1.5)
HCO3 BLDA-SCNC: 28 MMOL/L (ref 21–29)
HGB BLDA-MCNC: 12.4 G/DL (ref 13.5–17.5)
METHGB MFR BLDA: 0.3 %
O2 THERAPY: ABNORMAL
PCO2 BLDA: 56.7 MMHG (ref 35–45)
PH BLDA: 7.31 [PH] (ref 7.35–7.45)
PO2 BLDA: 53.9 MMHG (ref 75–108)
PROCALCITONIN SERPL IA-MCNC: 0.8 NG/ML (ref 0–0.15)
SAO2 % BLDA: 88 %

## 2023-12-31 PROCEDURE — 87449 NOS EACH ORGANISM AG IA: CPT

## 2023-12-31 PROCEDURE — 82803 BLOOD GASES ANY COMBINATION: CPT

## 2023-12-31 PROCEDURE — 6370000000 HC RX 637 (ALT 250 FOR IP): Performed by: INTERNAL MEDICINE

## 2023-12-31 PROCEDURE — 2580000003 HC RX 258: Performed by: SURGERY

## 2023-12-31 PROCEDURE — 2500000003 HC RX 250 WO HCPCS: Performed by: SURGERY

## 2023-12-31 PROCEDURE — 36600 WITHDRAWAL OF ARTERIAL BLOOD: CPT

## 2023-12-31 PROCEDURE — 6370000000 HC RX 637 (ALT 250 FOR IP): Performed by: STUDENT IN AN ORGANIZED HEALTH CARE EDUCATION/TRAINING PROGRAM

## 2023-12-31 PROCEDURE — 6360000002 HC RX W HCPCS: Performed by: SURGERY

## 2023-12-31 PROCEDURE — 2580000003 HC RX 258: Performed by: HOSPITALIST

## 2023-12-31 PROCEDURE — 99024 POSTOP FOLLOW-UP VISIT: CPT | Performed by: SURGERY

## 2023-12-31 PROCEDURE — 6370000000 HC RX 637 (ALT 250 FOR IP): Performed by: HOSPITALIST

## 2023-12-31 PROCEDURE — 2700000000 HC OXYGEN THERAPY PER DAY

## 2023-12-31 PROCEDURE — 84145 PROCALCITONIN (PCT): CPT

## 2023-12-31 PROCEDURE — 2060000000 HC ICU INTERMEDIATE R&B

## 2023-12-31 PROCEDURE — 6360000002 HC RX W HCPCS: Performed by: INTERNAL MEDICINE

## 2023-12-31 PROCEDURE — 87641 MR-STAPH DNA AMP PROBE: CPT

## 2023-12-31 PROCEDURE — 97535 SELF CARE MNGMENT TRAINING: CPT

## 2023-12-31 PROCEDURE — 94669 MECHANICAL CHEST WALL OSCILL: CPT

## 2023-12-31 PROCEDURE — 6360000002 HC RX W HCPCS: Performed by: HOSPITALIST

## 2023-12-31 PROCEDURE — 94640 AIRWAY INHALATION TREATMENT: CPT

## 2023-12-31 PROCEDURE — 94761 N-INVAS EAR/PLS OXIMETRY MLT: CPT

## 2023-12-31 PROCEDURE — 6370000000 HC RX 637 (ALT 250 FOR IP): Performed by: SURGERY

## 2023-12-31 PROCEDURE — 36415 COLL VENOUS BLD VENIPUNCTURE: CPT

## 2023-12-31 PROCEDURE — 99223 1ST HOSP IP/OBS HIGH 75: CPT | Performed by: INTERNAL MEDICINE

## 2023-12-31 PROCEDURE — 94660 CPAP INITIATION&MGMT: CPT

## 2023-12-31 PROCEDURE — 97530 THERAPEUTIC ACTIVITIES: CPT

## 2023-12-31 PROCEDURE — 97110 THERAPEUTIC EXERCISES: CPT

## 2023-12-31 RX ORDER — IPRATROPIUM BROMIDE AND ALBUTEROL SULFATE 2.5; .5 MG/3ML; MG/3ML
1 SOLUTION RESPIRATORY (INHALATION) EVERY 4 HOURS PRN
Status: DISCONTINUED | OUTPATIENT
Start: 2023-12-31 | End: 2023-12-31

## 2023-12-31 RX ORDER — IPRATROPIUM BROMIDE AND ALBUTEROL SULFATE 2.5; .5 MG/3ML; MG/3ML
1 SOLUTION RESPIRATORY (INHALATION)
Status: DISCONTINUED | OUTPATIENT
Start: 2023-12-31 | End: 2024-01-03 | Stop reason: HOSPADM

## 2023-12-31 RX ORDER — BENZONATATE 100 MG/1
100 CAPSULE ORAL 3 TIMES DAILY PRN
Status: DISCONTINUED | OUTPATIENT
Start: 2023-12-31 | End: 2024-01-03 | Stop reason: HOSPADM

## 2023-12-31 RX ORDER — ACETYLCYSTEINE 200 MG/ML
600 SOLUTION ORAL; RESPIRATORY (INHALATION)
Status: DISCONTINUED | OUTPATIENT
Start: 2023-12-31 | End: 2024-01-01

## 2023-12-31 RX ADMIN — ACETYLCYSTEINE 600 MG: 200 SOLUTION ORAL; RESPIRATORY (INHALATION) at 20:06

## 2023-12-31 RX ADMIN — HYDROCODONE BITARTRATE AND ACETAMINOPHEN 1 TABLET: 5; 325 TABLET ORAL at 02:32

## 2023-12-31 RX ADMIN — IPRATROPIUM BROMIDE AND ALBUTEROL SULFATE 1 DOSE: 2.5; .5 SOLUTION RESPIRATORY (INHALATION) at 16:48

## 2023-12-31 RX ADMIN — HYDROCODONE BITARTRATE AND ACETAMINOPHEN 1 TABLET: 5; 325 TABLET ORAL at 20:23

## 2023-12-31 RX ADMIN — ENOXAPARIN SODIUM 60 MG: 100 INJECTION SUBCUTANEOUS at 20:23

## 2023-12-31 RX ADMIN — ACETYLCYSTEINE 600 MG: 200 SOLUTION ORAL; RESPIRATORY (INHALATION) at 16:47

## 2023-12-31 RX ADMIN — FAMOTIDINE 20 MG: 10 INJECTION, SOLUTION INTRAVENOUS at 20:23

## 2023-12-31 RX ADMIN — HYDROCODONE BITARTRATE AND ACETAMINOPHEN 1 TABLET: 5; 325 TABLET ORAL at 10:54

## 2023-12-31 RX ADMIN — PIPERACILLIN AND TAZOBACTAM 4500 MG: 4; .5 INJECTION, POWDER, LYOPHILIZED, FOR SOLUTION INTRAVENOUS at 17:38

## 2023-12-31 RX ADMIN — Medication 10 ML: at 20:24

## 2023-12-31 RX ADMIN — Medication 10 ML: at 09:18

## 2023-12-31 RX ADMIN — FAMOTIDINE 20 MG: 20 TABLET, FILM COATED ORAL at 09:18

## 2023-12-31 RX ADMIN — ENOXAPARIN SODIUM 60 MG: 100 INJECTION SUBCUTANEOUS at 09:18

## 2023-12-31 RX ADMIN — METOPROLOL SUCCINATE 50 MG: 50 TABLET, EXTENDED RELEASE ORAL at 09:18

## 2023-12-31 RX ADMIN — IPRATROPIUM BROMIDE AND ALBUTEROL SULFATE 1 DOSE: 2.5; .5 SOLUTION RESPIRATORY (INHALATION) at 20:06

## 2023-12-31 ASSESSMENT — PAIN DESCRIPTION - DESCRIPTORS
DESCRIPTORS: ACHING
DESCRIPTORS: ACHING

## 2023-12-31 ASSESSMENT — PAIN SCALES - GENERAL
PAINLEVEL_OUTOF10: 7
PAINLEVEL_OUTOF10: 8
PAINLEVEL_OUTOF10: 0
PAINLEVEL_OUTOF10: 4
PAINLEVEL_OUTOF10: 5

## 2023-12-31 ASSESSMENT — PAIN DESCRIPTION - LOCATION
LOCATION: ABDOMEN

## 2023-12-31 ASSESSMENT — PAIN - FUNCTIONAL ASSESSMENT: PAIN_FUNCTIONAL_ASSESSMENT: ACTIVITIES ARE NOT PREVENTED

## 2023-12-31 ASSESSMENT — PAIN DESCRIPTION - PAIN TYPE
TYPE: SURGICAL PAIN
TYPE: SURGICAL PAIN

## 2023-12-31 ASSESSMENT — PAIN SCALES - WONG BAKER: WONGBAKER_NUMERICALRESPONSE: 0

## 2023-12-31 NOTE — PLAN OF CARE
Problem: Discharge Planning  Goal: Discharge to home or other facility with appropriate resources  12/31/2023 0933 by Tania Amor RN  Outcome: Progressing  12/31/2023 0523 by Alena Lehman RN  Outcome: Progressing     Problem: Safety - Adult  Goal: Free from fall injury  12/31/2023 0933 by Tania Amor RN  Outcome: Progressing  12/31/2023 0523 by Alena Lehman RN  Outcome: Progressing     Problem: ABCDS Injury Assessment  Goal: Absence of physical injury  12/31/2023 0933 by Tania Amor RN  Outcome: Progressing  12/31/2023 0523 by Alena Lehman RN  Outcome: Progressing     Problem: Pain  Goal: Verbalizes/displays adequate comfort level or baseline comfort level  12/31/2023 0933 by Tania Amor RN  Outcome: Progressing  12/31/2023 0523 by Alena Lehman RN  Outcome: Progressing     Problem: Skin/Tissue Integrity  Goal: Absence of new skin breakdown  Description: 1.  Monitor for areas of redness and/or skin breakdown  2.  Assess vascular access sites hourly  3.  Every 4-6 hours minimum:  Change oxygen saturation probe site  4.  Every 4-6 hours:  If on nasal continuous positive airway pressure, respiratory therapy assess nares and determine need for appliance change or resting period.  Outcome: Progressing

## 2023-12-31 NOTE — FLOWSHEET NOTE
0916 Pt assessed, see note for details. Pt requesting to get back in bed, this RN suggested he stay up in the chair a while longer. Pt agreed.   0916 Pt again requesting to go back to bed. This RN again suggest he stay in the chair for a while longer to support good pulmonary function, Pt reluctantly agreed.    1040 Pt refused to sit in chair for any longer. Pt transferred back to bed with assist x2.

## 2023-12-31 NOTE — CONSULTS
12/28/23 @ 1142 added Hosp to treatment team. Jillian Jimenez  
Called [facility] and placed consult with Dr. Hanson on 12/29 @ 9:24AM  PATRICIA MARTIN  
[facility] Hospitalist brief note     Consult received.  Full note to follow.     Pt went into Acute hypoxic hypercapnic respiratory failure. CXR showing infiltrates but likely more atelectasis rather than PNA. Pt has chronic dry cough no change. Denies any symptoms prior to surgery. Pt has CPAP at home but is broken hasn't been using it. His hypoxic picture is likely 2/2 OHS/LEESA rather than PNA. Will monitor blood gas, wean BIAP, O2 as tolerated. Encouraged pt to use BIPAP but was adament about taking it off. Await new blood gas.        Tarik Mtat MD  Hospitalist               
30 mg, Daily  metoprolol succinate, 25 mg, Daily  sodium chloride flush, 5-40 mL, 2 times per day  famotidine, 20 mg, BID   Or  famotidine (PEPCID) injection, 20 mg, BID  enoxaparin, 60 mg, BID       Quinolones    Allergies:   Allergies   Allergen Reactions    Quinolones Other (See Comments)     Ascending aortic aneurysm  The FDA (on 12/20/2018) warned that fluoroquinolone antibiotics should not be prescribed to patients who have an aortic aneurysm            Physical Exam/Objective:   Vitals:    12/29/23 1521   BP: 132/80   Pulse:    Resp:    Temp:    SpO2:        Intake/Output Summary (Last 24 hours) at 12/29/2023 1604  Last data filed at 12/29/2023 1505  Gross per 24 hour   Intake 1320 ml   Output 1775 ml   Net -455 ml         General appearance: in no acute distress   Respiratory: Respiratory effort normal, bilateral equal chest rise. No wheeze, no crackles  Cardiovascular: Ausculation shows RRR and  + edema   Abdomen: abdomen is soft, non distended, no masses, no pain with palpation.           
(LIPITOR) 40 MG tablet Take 1 tablet by mouth nightly 12/10/22   Larry Camp APRN - CNP   lisinopril (PRINIVIL;ZESTRIL) 20 MG tablet Take 2 tablets by mouth daily 12/10/22   Larry Camp APRN - CNP   metoprolol succinate (TOPROL XL) 25 MG extended release tablet Take 1 tablet by mouth daily    Provider, MD Giulia   furosemide (LASIX) 40 MG tablet Take one tablet daily  Patient taking differently: Take 1 tablet by mouth daily 12/3/21   Hanh Patel MD       Physical Exam: Need 8 Elements   Physical Exam     General: NAD, obese  Eyes: EOMI  ENT: neck supple  Cardiovascular: Regular rate.  Respiratory: Clear to auscultation  Gastrointestinal: Soft, non tender  Genitourinary: no suprapubic tenderness  Musculoskeletal: trace to 1+ bilateral LE edema, stasis changes bilateral LE  Skin: warm, dry  Neuro: Alert.  Psych: Mood appropriate.       Past Medical History:   PMHx   Past Medical History:   Diagnosis Date    AAA (abdominal aortic aneurysm) (Former[facility])     4.5 cm    Aortic root dilatation ([facility])     Aortic valve stenosis     Arthritis     knees    CAD (coronary artery disease)     Depressive disorder     Encounter for current long-term use of anticoagulants     YUAN (generalized anxiety disorder)     H/O gastric bypass 07/2014    History of blood transfusion     Hypercapnia     Hyperlipidemia     Hypertension     Obesity     Panic attacks     PE (pulmonary embolism) 2010    PVD (peripheral vascular disease) ([facility])     Sleep apnea     CPAP currently broken    Unsteady gait     uses walker     PSHX:  has a past surgical history that includes Cholecystectomy (2009); Gastric bypass surgery (2014); hernia repair (08/01/2017); Coronary stent placement (01/2023); Colonoscopy (N/A, 11/07/2023); Colonoscopy (N/A, 11/07/2023); Colonoscopy (11/07/2023); other surgical history (07/15/2014); Nasal septum surgery; and Small intestine surgery (N/A, 12/27/2023).  Allergies:   Allergies   Allergen Reactions    Quinolones Other (See 
DO Dakota   rivaroxaban (XARELTO) 20 MG TABS tablet Take 1 tablet by mouth daily Do not take Xarelto until you follow up with surgery  Patient taking differently: Take 1 tablet by mouth daily Do not take Xarelto until you follow up with surgery- LD early  11/9/23   Dakota Marshall DO   isosorbide mononitrate (IMDUR) 30 MG extended release tablet TAKE 1 TABLET BY MOUTH EVERY DAY  Patient taking differently: Take 1 tablet by mouth daily TAKE 1 TABLET BY MOUTH EVERY DAY 3/3/23   Hanh Patel MD   atorvastatin (LIPITOR) 40 MG tablet Take 1 tablet by mouth nightly 12/10/22   Larry aCmp APRN - CNP   lisinopril (PRINIVIL;ZESTRIL) 20 MG tablet Take 2 tablets by mouth daily 12/10/22   Larry Camp APRN - CNP   metoprolol succinate (TOPROL XL) 50 MG extended release tablet Take 1 tablet by mouth daily    Provider, MD Giulia   furosemide (LASIX) 40 MG tablet Take one tablet daily  Patient taking differently: Take 1 tablet by mouth daily 12/3/21   Hanh Patel MD       Scheduled Meds:   metoprolol succinate  50 mg Oral Daily    [Held by provider] isosorbide mononitrate  30 mg Oral Daily    sodium chloride flush  5-40 mL IntraVENous 2 times per day    famotidine  20 mg Oral BID    Or    famotidine (PEPCID) injection  20 mg IntraVENous BID    enoxaparin  60 mg SubCUTAneous BID       Continuous Infusions:   sodium chloride         PRN Meds:  benzonatate, simethicone, HYDROcodone 5 mg - acetaminophen, sodium chloride flush, sodium chloride, ondansetron **OR** ondansetron, acetaminophen, aluminum & magnesium hydroxide-simethicone      Objective    Test Results:  Imaging:  I have reviewed radiology images personally.    Chest x-ray December 27, 2023  Decreased lung volumes with bibasilar atelectasis      PFTS:  NA      Cardiology:  Lab Results   Component Value Date    LVEF 58 12/09/2022         Sleep:  NA    Labs:   Recent Labs     12/29/23  0515   PHART 7.323*   [id number] 50.9*   PO2ART 95.4

## 2023-12-31 NOTE — RT PROTOCOL NOTE
4 hours PRN for wheezing or increased work of breathing using Per Protocol order mode.        4-6 - enter or revise RT Bronchodilator order(s) to two equivalent RT bronchodilator orders with one order with BID Frequency and one order with Frequency of every 4 hours PRN wheezing or increased work of breathing using Per Protocol order mode.        7-10 - enter or revise RT Bronchodilator order(s) to two equivalent RT bronchodilator orders with one order with TID Frequency and one order with Frequency of every 4 hours PRN wheezing or increased work of breathing using Per Protocol order mode.       11-13 - enter or revise RT Bronchodilator order(s) to one equivalent RT bronchodilator order with QID Frequency and an Albuterol order with Frequency of every 4 hours PRN wheezing or increased work of breathing using Per Protocol order mode.      Greater than 13 - enter or revise RT Bronchodilator order(s) to one equivalent RT bronchodilator order with every 4 hours Frequency and an Albuterol order with Frequency of every 2 hours PRN wheezing or increased work of breathing using Per Protocol order mode.     RT to enter RT Home Evaluation for COPD & MDI Assessment order using Per Protocol order mode.    Electronically signed by Frannie Curiel RCP on 12/31/2023 at 2:55 PM

## 2023-12-31 NOTE — FLOWSHEET NOTE
Dr. Almonte per Pt's family request about the plan of care and other questions.     1130 Dr. Almonte at bedside speaking to Pt and Pt's family.

## 2024-01-01 ENCOUNTER — APPOINTMENT (OUTPATIENT)
Dept: GENERAL RADIOLOGY | Age: 69
DRG: 329 | End: 2024-01-01
Attending: SURGERY
Payer: MEDICARE

## 2024-01-01 PROBLEM — D64.9 NORMOCYTIC NORMOCHROMIC ANEMIA: Status: ACTIVE | Noted: 2024-01-01

## 2024-01-01 PROBLEM — R09.89 PULMONARY VENOUS CONGESTION: Status: ACTIVE | Noted: 2024-01-01

## 2024-01-01 PROBLEM — J96.02 ACUTE RESPIRATORY FAILURE WITH HYPERCAPNIA (HCC): Status: ACTIVE | Noted: 2024-01-01

## 2024-01-01 PROBLEM — R79.89 ELEVATED PROCALCITONIN: Status: ACTIVE | Noted: 2024-01-01

## 2024-01-01 PROBLEM — G47.33 OSA (OBSTRUCTIVE SLEEP APNEA): Status: ACTIVE | Noted: 2024-01-01

## 2024-01-01 LAB
ANION GAP SERPL CALCULATED.3IONS-SCNC: 7 MMOL/L (ref 3–16)
BASE EXCESS BLDA CALC-SCNC: 6.2 MMOL/L (ref -3–3)
BUN SERPL-MCNC: 13 MG/DL (ref 7–20)
CALCIUM SERPL-MCNC: 8.3 MG/DL (ref 8.3–10.6)
CHLORIDE SERPL-SCNC: 103 MMOL/L (ref 99–110)
CO2 BLDA-SCNC: 34.7 MMOL/L
CO2 SERPL-SCNC: 29 MMOL/L (ref 21–32)
COHGB MFR BLDA: 0.4 % (ref 0–1.5)
CREAT SERPL-MCNC: 0.8 MG/DL (ref 0.8–1.3)
DEPRECATED RDW RBC AUTO: 18.9 % (ref 12.4–15.4)
GFR SERPLBLD CREATININE-BSD FMLA CKD-EPI: >60 ML/MIN/{1.73_M2}
GLUCOSE SERPL-MCNC: 103 MG/DL (ref 70–99)
HCO3 BLDA-SCNC: 32.9 MMOL/L (ref 21–29)
HCT VFR BLD AUTO: 34.1 % (ref 40.5–52.5)
HGB BLD-MCNC: 10.6 G/DL (ref 13.5–17.5)
HGB BLDA-MCNC: 11.9 G/DL (ref 13.5–17.5)
LEGIONELLA AG UR QL: NORMAL
MCH RBC QN AUTO: 25.3 PG (ref 26–34)
MCHC RBC AUTO-ENTMCNC: 31 G/DL (ref 31–36)
MCV RBC AUTO: 81.5 FL (ref 80–100)
METHGB MFR BLDA: 0.6 %
MRSA DNA SPEC QL NAA+PROBE: NORMAL
O2 THERAPY: ABNORMAL
PCO2 BLDA: 58.3 MMHG (ref 35–45)
PH BLDA: 7.37 [PH] (ref 7.35–7.45)
PLATELET # BLD AUTO: 171 K/UL (ref 135–450)
PMV BLD AUTO: 7.3 FL (ref 5–10.5)
PO2 BLDA: 99.4 MMHG (ref 75–108)
POTASSIUM SERPL-SCNC: 4.5 MMOL/L (ref 3.5–5.1)
RBC # BLD AUTO: 4.19 M/UL (ref 4.2–5.9)
S PNEUM AG UR QL: NORMAL
SAO2 % BLDA: 97.4 %
SODIUM SERPL-SCNC: 139 MMOL/L (ref 136–145)
WBC # BLD AUTO: 5.5 K/UL (ref 4–11)

## 2024-01-01 PROCEDURE — 6360000002 HC RX W HCPCS: Performed by: SURGERY

## 2024-01-01 PROCEDURE — 80048 BASIC METABOLIC PNL TOTAL CA: CPT

## 2024-01-01 PROCEDURE — 94669 MECHANICAL CHEST WALL OSCILL: CPT

## 2024-01-01 PROCEDURE — 6370000000 HC RX 637 (ALT 250 FOR IP): Performed by: STUDENT IN AN ORGANIZED HEALTH CARE EDUCATION/TRAINING PROGRAM

## 2024-01-01 PROCEDURE — 6370000000 HC RX 637 (ALT 250 FOR IP): Performed by: SURGERY

## 2024-01-01 PROCEDURE — 2700000000 HC OXYGEN THERAPY PER DAY

## 2024-01-01 PROCEDURE — 6370000000 HC RX 637 (ALT 250 FOR IP): Performed by: INTERNAL MEDICINE

## 2024-01-01 PROCEDURE — 99233 SBSQ HOSP IP/OBS HIGH 50: CPT | Performed by: INTERNAL MEDICINE

## 2024-01-01 PROCEDURE — 2580000003 HC RX 258: Performed by: SURGERY

## 2024-01-01 PROCEDURE — 36415 COLL VENOUS BLD VENIPUNCTURE: CPT

## 2024-01-01 PROCEDURE — 71045 X-RAY EXAM CHEST 1 VIEW: CPT

## 2024-01-01 PROCEDURE — 94660 CPAP INITIATION&MGMT: CPT

## 2024-01-01 PROCEDURE — 2060000000 HC ICU INTERMEDIATE R&B

## 2024-01-01 PROCEDURE — 99024 POSTOP FOLLOW-UP VISIT: CPT | Performed by: SURGERY

## 2024-01-01 PROCEDURE — 94640 AIRWAY INHALATION TREATMENT: CPT

## 2024-01-01 PROCEDURE — 2580000003 HC RX 258: Performed by: HOSPITALIST

## 2024-01-01 PROCEDURE — 94761 N-INVAS EAR/PLS OXIMETRY MLT: CPT

## 2024-01-01 PROCEDURE — 85027 COMPLETE CBC AUTOMATED: CPT

## 2024-01-01 PROCEDURE — 6360000002 HC RX W HCPCS: Performed by: HOSPITALIST

## 2024-01-01 PROCEDURE — 6370000000 HC RX 637 (ALT 250 FOR IP): Performed by: HOSPITALIST

## 2024-01-01 PROCEDURE — 6360000002 HC RX W HCPCS: Performed by: STUDENT IN AN ORGANIZED HEALTH CARE EDUCATION/TRAINING PROGRAM

## 2024-01-01 PROCEDURE — 82803 BLOOD GASES ANY COMBINATION: CPT

## 2024-01-01 PROCEDURE — 36600 WITHDRAWAL OF ARTERIAL BLOOD: CPT

## 2024-01-01 PROCEDURE — 2500000003 HC RX 250 WO HCPCS: Performed by: SURGERY

## 2024-01-01 RX ORDER — DOCUSATE SODIUM 100 MG/1
100 CAPSULE, LIQUID FILLED ORAL DAILY
Status: DISCONTINUED | OUTPATIENT
Start: 2024-01-01 | End: 2024-01-03 | Stop reason: HOSPADM

## 2024-01-01 RX ORDER — FUROSEMIDE 10 MG/ML
40 INJECTION INTRAMUSCULAR; INTRAVENOUS ONCE
Status: COMPLETED | OUTPATIENT
Start: 2024-01-01 | End: 2024-01-01

## 2024-01-01 RX ADMIN — IPRATROPIUM BROMIDE AND ALBUTEROL SULFATE 1 DOSE: 2.5; .5 SOLUTION RESPIRATORY (INHALATION) at 08:45

## 2024-01-01 RX ADMIN — FAMOTIDINE 20 MG: 10 INJECTION, SOLUTION INTRAVENOUS at 21:57

## 2024-01-01 RX ADMIN — FAMOTIDINE 20 MG: 20 TABLET, FILM COATED ORAL at 10:53

## 2024-01-01 RX ADMIN — ACETYLCYSTEINE 600 MG: 200 SOLUTION ORAL; RESPIRATORY (INHALATION) at 20:01

## 2024-01-01 RX ADMIN — ENOXAPARIN SODIUM 60 MG: 100 INJECTION SUBCUTANEOUS at 21:57

## 2024-01-01 RX ADMIN — SIMETHICONE 80 MG: 80 TABLET, CHEWABLE ORAL at 14:46

## 2024-01-01 RX ADMIN — PIPERACILLIN AND TAZOBACTAM 4500 MG: 4; .5 INJECTION, POWDER, LYOPHILIZED, FOR SOLUTION INTRAVENOUS at 12:17

## 2024-01-01 RX ADMIN — PIPERACILLIN AND TAZOBACTAM 4500 MG: 4; .5 INJECTION, POWDER, LYOPHILIZED, FOR SOLUTION INTRAVENOUS at 00:59

## 2024-01-01 RX ADMIN — HYDROCODONE BITARTRATE AND ACETAMINOPHEN 1 TABLET: 5; 325 TABLET ORAL at 19:06

## 2024-01-01 RX ADMIN — METOPROLOL SUCCINATE 50 MG: 50 TABLET, EXTENDED RELEASE ORAL at 10:53

## 2024-01-01 RX ADMIN — IPRATROPIUM BROMIDE AND ALBUTEROL SULFATE 1 DOSE: 2.5; .5 SOLUTION RESPIRATORY (INHALATION) at 20:01

## 2024-01-01 RX ADMIN — FUROSEMIDE 40 MG: 10 INJECTION, SOLUTION INTRAMUSCULAR; INTRAVENOUS at 16:55

## 2024-01-01 RX ADMIN — ACETYLCYSTEINE 600 MG: 200 SOLUTION ORAL; RESPIRATORY (INHALATION) at 08:45

## 2024-01-01 RX ADMIN — PIPERACILLIN AND TAZOBACTAM 4500 MG: 4; .5 INJECTION, POWDER, LYOPHILIZED, FOR SOLUTION INTRAVENOUS at 21:56

## 2024-01-01 RX ADMIN — HYDROCODONE BITARTRATE AND ACETAMINOPHEN 1 TABLET: 5; 325 TABLET ORAL at 03:13

## 2024-01-01 RX ADMIN — ENOXAPARIN SODIUM 60 MG: 100 INJECTION SUBCUTANEOUS at 10:53

## 2024-01-01 RX ADMIN — Medication 10 ML: at 21:58

## 2024-01-01 ASSESSMENT — PAIN - FUNCTIONAL ASSESSMENT: PAIN_FUNCTIONAL_ASSESSMENT: ACTIVITIES ARE NOT PREVENTED

## 2024-01-01 ASSESSMENT — PAIN DESCRIPTION - PAIN TYPE: TYPE: SURGICAL PAIN

## 2024-01-01 ASSESSMENT — PAIN DESCRIPTION - ORIENTATION
ORIENTATION: MID
ORIENTATION: MID

## 2024-01-01 ASSESSMENT — PAIN DESCRIPTION - LOCATION
LOCATION: ABDOMEN

## 2024-01-01 ASSESSMENT — PAIN DESCRIPTION - DESCRIPTORS
DESCRIPTORS: ACHING
DESCRIPTORS: ACHING

## 2024-01-01 ASSESSMENT — PAIN SCALES - GENERAL
PAINLEVEL_OUTOF10: 7
PAINLEVEL_OUTOF10: 8
PAINLEVEL_OUTOF10: 8
PAINLEVEL_OUTOF10: 0
PAINLEVEL_OUTOF10: 0

## 2024-01-01 ASSESSMENT — PAIN SCALES - WONG BAKER: WONGBAKER_NUMERICALRESPONSE: 0

## 2024-01-01 NOTE — FLOWSHEET NOTE
1900 Pt placed back in bed per his request with assist x2. Pt denies any other needs, encouraged call light use.

## 2024-01-02 LAB
ANION GAP SERPL CALCULATED.3IONS-SCNC: 7 MMOL/L (ref 3–16)
BUN SERPL-MCNC: 11 MG/DL (ref 7–20)
CALCIUM SERPL-MCNC: 8.4 MG/DL (ref 8.3–10.6)
CHLORIDE SERPL-SCNC: 98 MMOL/L (ref 99–110)
CO2 SERPL-SCNC: 32 MMOL/L (ref 21–32)
CREAT SERPL-MCNC: 0.9 MG/DL (ref 0.8–1.3)
DEPRECATED RDW RBC AUTO: 18.9 % (ref 12.4–15.4)
GFR SERPLBLD CREATININE-BSD FMLA CKD-EPI: >60 ML/MIN/{1.73_M2}
GLUCOSE SERPL-MCNC: 96 MG/DL (ref 70–99)
HCT VFR BLD AUTO: 36.2 % (ref 40.5–52.5)
HGB BLD-MCNC: 11.1 G/DL (ref 13.5–17.5)
MCH RBC QN AUTO: 25.1 PG (ref 26–34)
MCHC RBC AUTO-ENTMCNC: 30.7 G/DL (ref 31–36)
MCV RBC AUTO: 81.9 FL (ref 80–100)
PLATELET # BLD AUTO: 183 K/UL (ref 135–450)
PMV BLD AUTO: 7.1 FL (ref 5–10.5)
POTASSIUM SERPL-SCNC: 4.2 MMOL/L (ref 3.5–5.1)
RBC # BLD AUTO: 4.42 M/UL (ref 4.2–5.9)
SODIUM SERPL-SCNC: 137 MMOL/L (ref 136–145)
WBC # BLD AUTO: 5.3 K/UL (ref 4–11)

## 2024-01-02 PROCEDURE — 6360000002 HC RX W HCPCS: Performed by: STUDENT IN AN ORGANIZED HEALTH CARE EDUCATION/TRAINING PROGRAM

## 2024-01-02 PROCEDURE — 6370000000 HC RX 637 (ALT 250 FOR IP): Performed by: INTERNAL MEDICINE

## 2024-01-02 PROCEDURE — 6360000002 HC RX W HCPCS: Performed by: HOSPITALIST

## 2024-01-02 PROCEDURE — 6370000000 HC RX 637 (ALT 250 FOR IP): Performed by: SURGERY

## 2024-01-02 PROCEDURE — 2580000003 HC RX 258: Performed by: SURGERY

## 2024-01-02 PROCEDURE — 2060000000 HC ICU INTERMEDIATE R&B

## 2024-01-02 PROCEDURE — 6370000000 HC RX 637 (ALT 250 FOR IP): Performed by: STUDENT IN AN ORGANIZED HEALTH CARE EDUCATION/TRAINING PROGRAM

## 2024-01-02 PROCEDURE — 2500000003 HC RX 250 WO HCPCS: Performed by: STUDENT IN AN ORGANIZED HEALTH CARE EDUCATION/TRAINING PROGRAM

## 2024-01-02 PROCEDURE — 2580000003 HC RX 258: Performed by: HOSPITALIST

## 2024-01-02 PROCEDURE — 2700000000 HC OXYGEN THERAPY PER DAY

## 2024-01-02 PROCEDURE — 94640 AIRWAY INHALATION TREATMENT: CPT

## 2024-01-02 PROCEDURE — 80048 BASIC METABOLIC PNL TOTAL CA: CPT

## 2024-01-02 PROCEDURE — 6370000000 HC RX 637 (ALT 250 FOR IP): Performed by: HOSPITALIST

## 2024-01-02 PROCEDURE — 36415 COLL VENOUS BLD VENIPUNCTURE: CPT

## 2024-01-02 PROCEDURE — 94761 N-INVAS EAR/PLS OXIMETRY MLT: CPT

## 2024-01-02 PROCEDURE — 85027 COMPLETE CBC AUTOMATED: CPT

## 2024-01-02 PROCEDURE — 94669 MECHANICAL CHEST WALL OSCILL: CPT

## 2024-01-02 PROCEDURE — 94660 CPAP INITIATION&MGMT: CPT

## 2024-01-02 PROCEDURE — 99024 POSTOP FOLLOW-UP VISIT: CPT | Performed by: SURGERY

## 2024-01-02 PROCEDURE — 6360000002 HC RX W HCPCS: Performed by: SURGERY

## 2024-01-02 PROCEDURE — 99232 SBSQ HOSP IP/OBS MODERATE 35: CPT | Performed by: INTERNAL MEDICINE

## 2024-01-02 RX ORDER — FUROSEMIDE 10 MG/ML
40 INJECTION INTRAMUSCULAR; INTRAVENOUS 2 TIMES DAILY
Status: COMPLETED | OUTPATIENT
Start: 2024-01-02 | End: 2024-01-02

## 2024-01-02 RX ADMIN — FUROSEMIDE 40 MG: 10 INJECTION, SOLUTION INTRAMUSCULAR; INTRAVENOUS at 17:39

## 2024-01-02 RX ADMIN — HYDROCODONE BITARTRATE AND ACETAMINOPHEN 1 TABLET: 5; 325 TABLET ORAL at 02:12

## 2024-01-02 RX ADMIN — ENOXAPARIN SODIUM 60 MG: 100 INJECTION SUBCUTANEOUS at 09:37

## 2024-01-02 RX ADMIN — PIPERACILLIN AND TAZOBACTAM 4500 MG: 4; .5 INJECTION, POWDER, LYOPHILIZED, FOR SOLUTION INTRAVENOUS at 13:20

## 2024-01-02 RX ADMIN — IPRATROPIUM BROMIDE AND ALBUTEROL SULFATE 1 DOSE: 2.5; .5 SOLUTION RESPIRATORY (INHALATION) at 08:19

## 2024-01-02 RX ADMIN — METOPROLOL SUCCINATE 50 MG: 50 TABLET, EXTENDED RELEASE ORAL at 09:36

## 2024-01-02 RX ADMIN — DOCUSATE SODIUM 100 MG: 100 CAPSULE, LIQUID FILLED ORAL at 09:36

## 2024-01-02 RX ADMIN — Medication 10 ML: at 21:02

## 2024-01-02 RX ADMIN — Medication 10 ML: at 09:37

## 2024-01-02 RX ADMIN — MICONAZOLE NITRATE: 2 POWDER TOPICAL at 21:09

## 2024-01-02 RX ADMIN — IPRATROPIUM BROMIDE AND ALBUTEROL SULFATE 1 DOSE: 2.5; .5 SOLUTION RESPIRATORY (INHALATION) at 20:05

## 2024-01-02 RX ADMIN — ENOXAPARIN SODIUM 60 MG: 100 INJECTION SUBCUTANEOUS at 21:02

## 2024-01-02 RX ADMIN — FUROSEMIDE 40 MG: 10 INJECTION, SOLUTION INTRAMUSCULAR; INTRAVENOUS at 09:57

## 2024-01-02 RX ADMIN — PIPERACILLIN AND TAZOBACTAM 4500 MG: 4; .5 INJECTION, POWDER, LYOPHILIZED, FOR SOLUTION INTRAVENOUS at 21:08

## 2024-01-02 RX ADMIN — FAMOTIDINE 20 MG: 20 TABLET, FILM COATED ORAL at 09:37

## 2024-01-02 RX ADMIN — PIPERACILLIN AND TAZOBACTAM 4500 MG: 4; .5 INJECTION, POWDER, LYOPHILIZED, FOR SOLUTION INTRAVENOUS at 04:43

## 2024-01-02 RX ADMIN — HYDROCODONE BITARTRATE AND ACETAMINOPHEN 1 TABLET: 5; 325 TABLET ORAL at 18:21

## 2024-01-02 RX ADMIN — FAMOTIDINE 20 MG: 20 TABLET, FILM COATED ORAL at 21:02

## 2024-01-02 RX ADMIN — HYDROCODONE BITARTRATE AND ACETAMINOPHEN 1 TABLET: 5; 325 TABLET ORAL at 09:36

## 2024-01-02 ASSESSMENT — PAIN DESCRIPTION - LOCATION
LOCATION: ABDOMEN

## 2024-01-02 ASSESSMENT — PAIN SCALES - GENERAL
PAINLEVEL_OUTOF10: 6
PAINLEVEL_OUTOF10: 8
PAINLEVEL_OUTOF10: 6
PAINLEVEL_OUTOF10: 0
PAINLEVEL_OUTOF10: 6

## 2024-01-02 ASSESSMENT — PAIN DESCRIPTION - DESCRIPTORS: DESCRIPTORS: ACHING

## 2024-01-02 ASSESSMENT — PAIN DESCRIPTION - ORIENTATION
ORIENTATION: MID
ORIENTATION: MID

## 2024-01-02 ASSESSMENT — PAIN DESCRIPTION - PAIN TYPE: TYPE: SURGICAL PAIN

## 2024-01-02 ASSESSMENT — PAIN - FUNCTIONAL ASSESSMENT: PAIN_FUNCTIONAL_ASSESSMENT: ACTIVITIES ARE NOT PREVENTED

## 2024-01-03 VITALS
HEIGHT: 69 IN | OXYGEN SATURATION: 94 % | TEMPERATURE: 97.8 F | DIASTOLIC BLOOD PRESSURE: 92 MMHG | SYSTOLIC BLOOD PRESSURE: 167 MMHG | RESPIRATION RATE: 18 BRPM | BODY MASS INDEX: 46.65 KG/M2 | WEIGHT: 315 LBS | HEART RATE: 86 BPM

## 2024-01-03 LAB
ANION GAP SERPL CALCULATED.3IONS-SCNC: 8 MMOL/L (ref 3–16)
BUN SERPL-MCNC: 13 MG/DL (ref 7–20)
CALCIUM SERPL-MCNC: 8.7 MG/DL (ref 8.3–10.6)
CHLORIDE SERPL-SCNC: 95 MMOL/L (ref 99–110)
CO2 SERPL-SCNC: 36 MMOL/L (ref 21–32)
CREAT SERPL-MCNC: 1.1 MG/DL (ref 0.8–1.3)
DEPRECATED RDW RBC AUTO: 18.5 % (ref 12.4–15.4)
GFR SERPLBLD CREATININE-BSD FMLA CKD-EPI: >60 ML/MIN/{1.73_M2}
GLUCOSE SERPL-MCNC: 103 MG/DL (ref 70–99)
HCT VFR BLD AUTO: 34.6 % (ref 40.5–52.5)
HGB BLD-MCNC: 10.8 G/DL (ref 13.5–17.5)
MCH RBC QN AUTO: 25 PG (ref 26–34)
MCHC RBC AUTO-ENTMCNC: 31.3 G/DL (ref 31–36)
MCV RBC AUTO: 79.8 FL (ref 80–100)
PLATELET # BLD AUTO: 230 K/UL (ref 135–450)
PMV BLD AUTO: 7.1 FL (ref 5–10.5)
POTASSIUM SERPL-SCNC: 3.9 MMOL/L (ref 3.5–5.1)
RBC # BLD AUTO: 4.33 M/UL (ref 4.2–5.9)
SODIUM SERPL-SCNC: 139 MMOL/L (ref 136–145)
WBC # BLD AUTO: 5.7 K/UL (ref 4–11)

## 2024-01-03 PROCEDURE — 94660 CPAP INITIATION&MGMT: CPT

## 2024-01-03 PROCEDURE — 97110 THERAPEUTIC EXERCISES: CPT

## 2024-01-03 PROCEDURE — 99024 POSTOP FOLLOW-UP VISIT: CPT | Performed by: SURGERY

## 2024-01-03 PROCEDURE — 99232 SBSQ HOSP IP/OBS MODERATE 35: CPT | Performed by: INTERNAL MEDICINE

## 2024-01-03 PROCEDURE — 97530 THERAPEUTIC ACTIVITIES: CPT

## 2024-01-03 PROCEDURE — 6360000002 HC RX W HCPCS: Performed by: HOSPITALIST

## 2024-01-03 PROCEDURE — 6360000002 HC RX W HCPCS: Performed by: SURGERY

## 2024-01-03 PROCEDURE — 6370000000 HC RX 637 (ALT 250 FOR IP): Performed by: STUDENT IN AN ORGANIZED HEALTH CARE EDUCATION/TRAINING PROGRAM

## 2024-01-03 PROCEDURE — 97535 SELF CARE MNGMENT TRAINING: CPT

## 2024-01-03 PROCEDURE — 6370000000 HC RX 637 (ALT 250 FOR IP): Performed by: SURGERY

## 2024-01-03 PROCEDURE — 2580000003 HC RX 258: Performed by: HOSPITALIST

## 2024-01-03 PROCEDURE — 6370000000 HC RX 637 (ALT 250 FOR IP): Performed by: HOSPITALIST

## 2024-01-03 PROCEDURE — 80048 BASIC METABOLIC PNL TOTAL CA: CPT

## 2024-01-03 PROCEDURE — 94640 AIRWAY INHALATION TREATMENT: CPT

## 2024-01-03 PROCEDURE — 94669 MECHANICAL CHEST WALL OSCILL: CPT

## 2024-01-03 PROCEDURE — 2580000003 HC RX 258: Performed by: SURGERY

## 2024-01-03 PROCEDURE — 36415 COLL VENOUS BLD VENIPUNCTURE: CPT

## 2024-01-03 PROCEDURE — 6370000000 HC RX 637 (ALT 250 FOR IP): Performed by: INTERNAL MEDICINE

## 2024-01-03 PROCEDURE — 85027 COMPLETE CBC AUTOMATED: CPT

## 2024-01-03 RX ORDER — HYDROCODONE BITARTRATE AND ACETAMINOPHEN 5; 325 MG/1; MG/1
1 TABLET ORAL EVERY 4 HOURS PRN
Qty: 18 TABLET | Refills: 0 | Status: SHIPPED | OUTPATIENT
Start: 2024-01-03 | End: 2024-01-06

## 2024-01-03 RX ORDER — PSEUDOEPHEDRINE HCL 30 MG
100 TABLET ORAL DAILY
Qty: 30 CAPSULE | Refills: 0 | Status: SHIPPED | OUTPATIENT
Start: 2024-01-04

## 2024-01-03 RX ADMIN — PIPERACILLIN AND TAZOBACTAM 4500 MG: 4; .5 INJECTION, POWDER, LYOPHILIZED, FOR SOLUTION INTRAVENOUS at 12:56

## 2024-01-03 RX ADMIN — MICONAZOLE NITRATE: 2 POWDER TOPICAL at 09:42

## 2024-01-03 RX ADMIN — ENOXAPARIN SODIUM 60 MG: 100 INJECTION SUBCUTANEOUS at 08:18

## 2024-01-03 RX ADMIN — PIPERACILLIN AND TAZOBACTAM 4500 MG: 4; .5 INJECTION, POWDER, LYOPHILIZED, FOR SOLUTION INTRAVENOUS at 04:10

## 2024-01-03 RX ADMIN — SODIUM CHLORIDE 10 ML: 9 INJECTION, SOLUTION INTRAVENOUS at 12:55

## 2024-01-03 RX ADMIN — HYDROCODONE BITARTRATE AND ACETAMINOPHEN 1 TABLET: 5; 325 TABLET ORAL at 07:48

## 2024-01-03 RX ADMIN — METOPROLOL SUCCINATE 50 MG: 50 TABLET, EXTENDED RELEASE ORAL at 08:18

## 2024-01-03 RX ADMIN — DOCUSATE SODIUM 100 MG: 100 CAPSULE, LIQUID FILLED ORAL at 08:18

## 2024-01-03 RX ADMIN — IPRATROPIUM BROMIDE AND ALBUTEROL SULFATE 1 DOSE: 2.5; .5 SOLUTION RESPIRATORY (INHALATION) at 08:53

## 2024-01-03 RX ADMIN — FAMOTIDINE 20 MG: 20 TABLET, FILM COATED ORAL at 08:18

## 2024-01-03 ASSESSMENT — PAIN DESCRIPTION - LOCATION: LOCATION: ABDOMEN

## 2024-01-03 ASSESSMENT — PAIN SCALES - GENERAL: PAINLEVEL_OUTOF10: 6

## 2024-01-03 NOTE — DISCHARGE INSTR - COC
0.5mL 03/28/2013       Active Problems:  Patient Active Problem List   Diagnosis Code    Chest pain R07.9    HTN (hypertension) I10    Morbid obesity with BMI of 50.0-59.9, adult (HCC) E66.01, Z68.43    Hx pulmonary embolism Z86.711    Shortness of breath R06.02    Chest discomfort R07.89    Diaphoresis R61    Abnormal cardiovascular stress test R94.39    Coronary artery disease involving native coronary artery of native heart without angina pectoris I25.10    CAD in native artery I25.10    GIB (gastrointestinal bleeding) K92.2    Iron deficiency anemia due to chronic blood loss D50.0    Adenomatous polyp of sigmoid colon D12.5    S/P laparoscopic-assisted sigmoidectomy Z90.49    Acute respiratory failure with hypercapnia (HCC) J96.02    LEESA (obstructive sleep apnea) G47.33    Pulmonary venous congestion R09.89    Normocytic normochromic anemia D64.9    Elevated procalcitonin R79.89       Isolation/Infection:   Isolation            No Isolation          Patient Infection Status       None to display            Nurse Assessment:  Last Vital Signs: /82   Pulse 85   Temp 97.4 °F (36.3 °C) (Axillary)   Resp 18   Ht 1.753 m (5' 9\")   Wt (!) 181.6 kg (400 lb 6.4 oz)   SpO2 94%   BMI 59.13 kg/m²     Last documented pain score (0-10 scale): Pain Level: 6  Last Weight:   Wt Readings from Last 1 Encounters:   01/03/24 (!) 181.6 kg (400 lb 6.4 oz)     Mental Status:  oriented, alert, coherent, logical, thought processes intact, and able to concentrate and follow conversation    IV Access:  - None    Nursing Mobility/ADLs:  Walking   Assisted  Transfer  Assisted  Bathing  Assisted  Dressing  Dependent  Toileting  Assisted  Feeding  Independent  Med Admin  Independent  Med Delivery   whole    Wound Care Documentation and Therapy:  Incision 12/27/23 Abdomen (Active)   Dressing Status Clean;Dry;Intact 01/03/24 0815   Incision Cleansed Soap and water 01/02/24 1646   Dressing/Treatment Surgical glue 01/03/24 0815

## 2024-01-03 NOTE — DISCHARGE INSTRUCTIONS
[facility]    Yoel Mcclain M.D.   [facility] Office      [facility] Office        [facility]               [address]                [address]  Momo Molina M.D.              [address]           [address]          [place] [address]         [place] [address]  Matthew Summers M.D                         [phone]        [facility]                   Tim Jorge M.D.          [facility]                                                          POST-OPERATIVE INSTRUCTIONS FOLLOWING A COLON RESECTION    Call the office to schedule your post-operative appointment with your surgeon for two (2) weeks.   You will have pain medicine ordered. Take as directed.    Your incision is closed with:   White steri-strips; these will peel away in 7-10 days.   Surgical glue   Staples, these will be removed in the office during your post-op  appointment.    You may shower.  Wash incisions gently, and pat them dry. Do not rub your incisions.    General guidelines for activity:   Avoid strenuous activity or lifting anything heavier than 15 pounds.    It is OK to be up walking around, and up and down stairs.   Do what is comfortable: stop and rest when you feel tired.     Drink plenty of fluids and stay on a bland diet for 2-3 days after surgery.     Do NOT drive until after your first post-operative appointment and while taking your narcotic pain medicine     Watch for signs of infection:  Excessive warmth or bright redness around your incisions  Leakage of bloody or cloudy fluid from you incisions  Fever over 100.5    If you experience constipation:  Increase your water intake  Increase your activity, walking is best.  A stool softener or mild laxative may be necessary if you still have not had a bowel  movement ; call the office for further instructions.      Please take note: IF you do not

## 2024-01-03 NOTE — PLAN OF CARE
Was asked to call patient's daughter Milagros and updated on plan of care as per .  Milagros had concerns about patient's previous hospitalizations and discharges and she has been in touch with her cardiologist at [facility].    Discussed previous chest x-ray, elevated CO2 on BMP, as well as elevated proBNP..  Discussed with Milagros that since those have been resulted patient has been diuresed with IV Lasix and has been on BiPAP.  There are plans underway to arrange for BiPAP at the SNF.  Expect CO2 to improve.  Discussed with Milagros that clinically since he appears euvolemic given lack of oxygen requirement now and pulmonary exam, no indication for getting repeat chest x-ray at this point or proBNP.  Medically patient looks okay for discharge.  All questions were answered over the phone.    Final disposition as per general surgery team.

## 2024-01-03 NOTE — CARE COORDINATION
CASE MANAGEMENT DISCHARGE SUMMARY      [facility]    [facility]  Hospital Exemption Notification (HENS) completed: Yes    IMM given: (date) 1/2    New Durable Medical Equipment ordered/agency: defer to SNF    Transportation:       Medical Transport explained to pt/family. Pt/family voice no agency preference.  No preference  Agency used:[facility]   time:3:45 pm   Ambulance form completed: Yes    Confirmed discharge plan with:MD/[facility] admissions     Patient: yes     Family:  yes   Name:Milagros Monique Contact number: [phone]     Facility/Agency, name:  [facility] Received via electronically   Phone number for report to facility: [phone]     RN, name: Marv    
[facility] can accept. No pre-cert needed. Traditional Medicare. Will assist with d/c to SNF when ready. Okay for discharge per pulmonary per Dr. Hunter.     Transport arranged for 3:45 PM however family had concerns about Co2 and want to speak with MD . MD aware.  
Patient has new nephrology consult placed for ANNAMARIA. Patient is s/p robotic sigmoidectomy for large polyp . Advancing diet to full liquids. General Surgery awaiting pathology.PT/OT recommending SNF. Patient does not want to go but family thinks he will need it . They would like time to discuss further. Patient needs 3 night qualifying inpatient stay under Medicare guidelines. Not medically ready for discharge. CM will follow up with patient closer to discharge regarding rehab . Traditional Medicare so no pre-cert required.   
Spoke with spouse and she states they agree to rehab. They live near [place] and wife request a referral to [facility]. Referral initiated with admissions. Not medically. Ready today. General surgery and GI following. Receiving IV Lasix BID today. Will not need pre-cert for SNF.   
and shares the quality data associated with the providers was provided to: (P) Patient   Patient Representative Name:       The Patient and/or Patient Representative Agree with the Discharge Plan?  Ongoing discussion    Alicia Solis RN  Case Management Department  Ph: [phone] Fax: [phone]

## 2024-01-03 NOTE — PROGRESS NOTES
Progress note    CC: sigmoidectomy, respiratory failure  Summary:   Ernesto Parnell is being seen by nephrology for Acute kidney injury (ANNAMARIA). He is a 68 y.o. male with a PMH significant for AAA, ortic root dilation, aortic stenosis, CAD, h/o gastric bypass, hypertension, LEESA, PVD, atrial fibrillation who presented to the hospital 12/27/2023 for an elective robotic sigmoidectomy for a large sigmoid polyp. Post operatively he was noted to have high O2 needs and hypercapnic respiratory failure. Cr has also trended up since admission from 0.8 to 1.4 at time of consult.     Interval History  Seen at bedside  /83  Got lasix IV 20 mg yesterday and made 2575 mL urine yesterday  Has a chong.  Cr improved to 1.0 today from 1.4  O2 needs down to 1 LPM today      Plan:   - responded well to diuretics  - O2 needs better today  - will hold lasix today and re-assess diuretic needs  - renal function back to baseline      Lina Hanson MD  [facility]  Office: [phone]    Assessment:   ANNAMARIA:  - baseline Cr 0.8-1.0  - Cr on admission was 0.8, increased to 1.4 at time of consult  - had sigmoidectomy on admission and post op course complicated by respiratory failure and fluid overload.     Hyponatremia:  - mild, hypervolemic  - manage with diuretics     Hypertension:  - home regimen: lisinopril 40 mg daily, lasix 40 mg daily, toprol XL 25 mg daily.  - lisinopril on hold     Respiratory failure:  - required bipap initially  - CXR concerning for pna  - echo LVEF 55-60%, normal diastolic function.           PE:   Vitals:    12/30/23 1538   BP:    Pulse: 71   Resp: 18   Temp:    SpO2: 100%       General appearance: in NAD  Respiratory: Respiratory effort appears normal, bilateral equal chest rise, no wheeze, no crackles  Cardiovascular: Ausculation shows RRR no edema  Abdomen: No visible mass or tenderness, non distended.           
                                               Progress note    CC: sigmoidectomy, respiratory failure  Summary:   Ernesto Parnell is being seen by nephrology for Acute kidney injury (ANNAMARIA). He is a 68 y.o. male with a PMH significant for AAA, ortic root dilation, aortic stenosis, CAD, h/o gastric bypass, hypertension, LEESA, PVD, atrial fibrillation who presented to the hospital 12/27/2023 for an elective robotic sigmoidectomy for a large sigmoid polyp. Post operatively he was noted to have high O2 needs and hypercapnic respiratory failure. Cr has also trended up since admission from 0.8 to 1.4 at time of consult.     Interval History  Seen at bedside  /89  On 2 LPM O2 today  Made 2850 mL urine yesterday with lasix IV 40 mg once  Creatinine stable at 0.9  Electrolytes stable      Plan:   -lasix IV 40 mg BID for today  -Monitor and replace electrolytes  -Strict I's and O's        Lina Hanson MD  [facility]  Office: [phone]    Assessment:   ANNAMARIA:  - baseline Cr 0.8-1.0  - Cr on admission was 0.8, increased to 1.4 at time of consult  - had sigmoidectomy on admission and post op course complicated by respiratory failure and fluid overload.     Hyponatremia:  - mild, hypervolemic  - manage with diuretics     Hypertension:  - home regimen: lisinopril 40 mg daily, lasix 40 mg daily, toprol XL 25 mg daily.  - lisinopril on hold     Respiratory failure:  - required bipap initially  - CXR concerning for pna  - echo LVEF 55-60%, normal diastolic function.           PE:   Vitals:    01/02/24 0900   BP: (!) 160/89   Pulse: 100   Resp: 20   Temp: 98.2 °F (36.8 °C)   SpO2: 98%       General appearance: in NAD  Respiratory: Respiratory effort appears normal, bilateral equal chest rise, no wheeze, no crackles  Cardiovascular: Ausculation shows RRR no edema  Abdomen: No visible mass or tenderness, non distended.           
       [facility] GENERAL SURGERY    Surgery Progress Note           POD # 6    PATIENT NAME: Ernesto Parnell     TODAY'S DATE: 1/2/2024    INTERVAL HISTORY:    Started having bms     OBJECTIVE:   VITALS:  BP (!) 160/89   Pulse 100   Temp 98.2 °F (36.8 °C) (Oral)   Resp 18   Ht 1.753 m (5' 9\")   Wt (!) 183.2 kg (403 lb 12.8 oz)   SpO2 97%   BMI 59.63 kg/m²     INTAKE/OUTPUT:    I/O last 3 completed shifts:  In: 360 [P.O.:360]  Out: 3700 [Urine:3700]  No intake/output data recorded.              CONSTITUTIONAL:  fatigued and alert  LUNGS:  clear to auscultation  ABDOMEN:   hypoactive bowel sounds, soft,  obese, mild tender  INCISION: clean, dry, no drainage, small blister    Data:  CBC:   Recent Labs     12/30/23  1448 01/01/24  0457 01/02/24  0506   WBC 8.4 5.5 5.3   HGB 10.8* 10.6* 11.1*   HCT 35.3* 34.1* 36.2*    171 183       BMP:    Recent Labs     12/30/23  1448 01/01/24  0457 01/02/24  0507    139 137   K 4.6 4.5 4.2    103 98*   CO2 30 29 32   BUN 21* 13 11   CREATININE 1.0 0.8 0.9   GLUCOSE 122* 103* 96       Hepatic:   No results for input(s): \"AST\", \"ALT\", \"ALB\", \"BILITOT\", \"ALKPHOS\" in the last 72 hours.    Mag:      No results for input(s): \"MG\" in the last 72 hours.     Phos:     No results for input(s): \"PHOS\" in the last 72 hours.     INR: No results for input(s): \"INR\" in the last 72 hours.      Radiology Review:       ASSESSMENT AND PLAN:  68 y.o. male status post robotic sigmoidectomy for large sigmoid polyp    - CV - appreciate Int Med support for cardiac meds   - Pulm - BIPAP   - GI - low fiber diet   - PT/OT   - Proph - cont Lovenox, Pepcid    Discussed benign pathology         Electronically signed by Jame Molina MD     
       [facility] GENERAL SURGERY    Surgery Progress Note           POD # 4    PATIENT NAME: Ernesto Parnell     TODAY'S DATE: 12/31/2023    INTERVAL HISTORY:    Pt having flatus, no bms,      OBJECTIVE:   VITALS:  BP (!) 163/88   Pulse 80   Temp 98.2 °F (36.8 °C) (Oral)   Resp 22   Ht 1.753 m (5' 9\")   Wt (!) 187.9 kg (414 lb 3 oz)   SpO2 96%   BMI 61.16 kg/m²     INTAKE/OUTPUT:    I/O last 3 completed shifts:  In: 720 [P.O.:720]  Out: 3700 [Urine:3700]  I/O this shift:  In: -   Out: 90 [Urine:90]              CONSTITUTIONAL:  fatigued and alert  LUNGS:  clear to auscultation  ABDOMEN:   hypoactive bowel sounds, soft,  obese, mild tender  INCISION: clean, dry, no drainage, small blister    Data:  CBC:   Recent Labs     12/28/23  1743 12/29/23  0547 12/30/23  1448   WBC 9.6 9.5 8.4   HGB 10.9* 10.8* 10.8*   HCT 35.9* 35.2* 35.3*    126* 136       BMP:    Recent Labs     12/28/23  1743 12/29/23  0547 12/30/23  1448   * 133* 137   K 4.9 4.6 4.6    101 103   CO2 27 26 30   BUN 26* 29* 21*   CREATININE 1.3 1.4* 1.0   GLUCOSE 141* 109* 122*       Hepatic:   Recent Labs     12/28/23  1743   AST 18   ALT 8*   BILITOT 0.6   ALKPHOS 76       Mag:      Recent Labs     12/29/23  0547   MG 2.10        Phos:     Recent Labs     12/29/23  0547   PHOS 2.6        INR: No results for input(s): \"INR\" in the last 72 hours.      Radiology Review:       ASSESSMENT AND PLAN:  68 y.o. male status post robotic sigmoidectomy for large sigmoid polyp    - CV - appreciate Int Med support for cardiac meds   - Pulm - BIPAP   - GI - stay on full liquids until further gi function   - PT/OT   - Proph - cont Lovenox, Pepcid    Discussed benign pathology         Electronically signed by Jame Molina MD     
       [facility] GENERAL SURGERY    Surgery Progress Note           POD # 7    PATIENT NAME: Ernesto Parnell     TODAY'S DATE: 1/3/2024    INTERVAL HISTORY:    Off oxygen     OBJECTIVE:   VITALS:  /82   Pulse 85   Temp 97.4 °F (36.3 °C) (Axillary)   Resp 18   Ht 1.753 m (5' 9\")   Wt (!) 181.6 kg (400 lb 6.4 oz)   SpO2 94%   BMI 59.13 kg/m²     INTAKE/OUTPUT:    I/O last 3 completed shifts:  In: 850 [P.O.:850]  Out: 6300 [Urine:6300]  No intake/output data recorded.              CONSTITUTIONAL:  fatigued and alert  LUNGS:  clear to auscultation  ABDOMEN:   hypoactive bowel sounds, soft,  obese, mild tender  INCISION: clean, dry, no drainage, small blister    Data:  CBC:   Recent Labs     01/01/24  0457 01/02/24  0506 01/03/24  0512   WBC 5.5 5.3 5.7   HGB 10.6* 11.1* 10.8*   HCT 34.1* 36.2* 34.6*    183 230       BMP:    Recent Labs     01/01/24  0457 01/02/24  0507 01/03/24  0512    137 139   K 4.5 4.2 3.9    98* 95*   CO2 29 32 36*   BUN 13 11 13   CREATININE 0.8 0.9 1.1   GLUCOSE 103* 96 103*       Hepatic:   No results for input(s): \"AST\", \"ALT\", \"ALB\", \"BILITOT\", \"ALKPHOS\" in the last 72 hours.    Mag:      No results for input(s): \"MG\" in the last 72 hours.     Phos:     No results for input(s): \"PHOS\" in the last 72 hours.     INR: No results for input(s): \"INR\" in the last 72 hours.      Radiology Review:       ASSESSMENT AND PLAN:  68 y.o. male status post robotic sigmoidectomy for large sigmoid polyp    - CV - appreciate Int Med support for cardiac meds   - Pulm - BIPAP   - GI - low fiber diet   - PT/OT   - Proph - cont Lovenox, Pepcid  Discharge to [facility]          Electronically signed by Jame Molina MD     
       [facility] GENERAL SURGERY    Surgery Progress Note           POD # 5    PATIENT NAME: Ernesto Parnell     TODAY'S DATE: 1/1/2024    INTERVAL HISTORY:    Abd painstable     OBJECTIVE:   VITALS:  BP (!) 153/80   Pulse 77   Temp 97.8 °F (36.6 °C) (Oral)   Resp 20   Ht 1.753 m (5' 9\")   Wt (!) 182 kg (401 lb 3.2 oz)   SpO2 100%   BMI 59.25 kg/m²     INTAKE/OUTPUT:    I/O last 3 completed shifts:  In: 240 [P.O.:240]  Out: 2590 [Urine:2590]  No intake/output data recorded.              CONSTITUTIONAL:  fatigued and alert  LUNGS:  clear to auscultation  ABDOMEN:   hypoactive bowel sounds, soft,  obese, mild tender  INCISION: clean, dry, no drainage, small blister    Data:  CBC:   Recent Labs     12/30/23  1448 01/01/24  0457   WBC 8.4 5.5   HGB 10.8* 10.6*   HCT 35.3* 34.1*    171       BMP:    Recent Labs     12/30/23  1448 01/01/24  0457    139   K 4.6 4.5    103   CO2 30 29   BUN 21* 13   CREATININE 1.0 0.8   GLUCOSE 122* 103*       Hepatic:   No results for input(s): \"AST\", \"ALT\", \"ALB\", \"BILITOT\", \"ALKPHOS\" in the last 72 hours.    Mag:      No results for input(s): \"MG\" in the last 72 hours.     Phos:     No results for input(s): \"PHOS\" in the last 72 hours.     INR: No results for input(s): \"INR\" in the last 72 hours.      Radiology Review:       ASSESSMENT AND PLAN:  68 y.o. male status post robotic sigmoidectomy for large sigmoid polyp    - CV - appreciate Int Med support for cardiac meds   - Pulm - BIPAP   - GI - stay on full liquids until further gi function   - PT/OT   - Proph - cont Lovenox, Pepcid    Discussed benign pathology         Electronically signed by Jame Molina MD     
       [facility] GENERAL SURGERY    Surgery Progress Note           POD # 1    PATIENT NAME: Ernesto Parnell     TODAY'S DATE: 12/28/2023    INTERVAL HISTORY:    Pt  resting comfortably currently.  Needed O2 support via Bipap overnight but off it this AM.  Complains of upper abdominal pains around the surgical incisions.     OBJECTIVE:   VITALS:  /60   Pulse 73   Temp 98.3 °F (36.8 °C) (Axillary)   Resp 18   Ht 1.753 m (5' 9\")   Wt (!) 186 kg (410 lb)   SpO2 97% Comment: 2.5L  BMI 60.55 kg/m²     INTAKE/OUTPUT:    I/O last 3 completed shifts:  In: 1880 [P.O.:480; I.V.:1400]  Out: 900 [Urine:850; Blood:50]  I/O this shift:  In: 360 [P.O.:360]  Out: -               CONSTITUTIONAL:  awake and alert  LUNGS:     ABDOMEN:    , soft, non-distended, tenderness noted at epigastric incisions   INCISION: clean, dry    Data:  CBC:   Recent Labs     12/28/23  0521   WBC 13.6*   HGB 11.5*   HCT 37.4*        BMP:    Recent Labs     12/27/23  0842 12/28/23  0521    134*   K 4.5 5.3*    103   CO2 30 26   BUN 17 22*   CREATININE 0.8 1.1   GLUCOSE 101* 130*     Hepatic: No results for input(s): \"AST\", \"ALT\", \"ALB\", \"BILITOT\", \"ALKPHOS\" in the last 72 hours.  Mag:    No results for input(s): \"MG\" in the last 72 hours.   Phos:   No results for input(s): \"PHOS\" in the last 72 hours.   INR: No results for input(s): \"INR\" in the last 72 hours.      Radiology Review:       ASSESSMENT AND PLAN:  68 y.o. male status post robotic sigmoidectomy for large sigmoid polyp   - appreciate Int Med support for pulmonary, cardiac issues   - cont w/ clear liquids for now, await return of bowel function   - PT/OT   - Prophy - cont Lovenox, Pepcid    Await pathology report         Electronically signed by SHAHAB GAMINO MD     
       [facility] GENERAL SURGERY    Surgery Progress Note           POD # 2    PATIENT NAME: Ernesto Parnell     TODAY'S DATE: 12/29/2023    INTERVAL HISTORY:    Pt  resting comfortably.  Having trouble keeping the Bipap mask on at night.  Pain at epigastric incision.  Passing occasional gas, feels \"rumbling\" but no BM yet.  Taking clears well.     OBJECTIVE:   VITALS:  BP 97/61   Pulse 89   Temp 98.1 °F (36.7 °C) (Oral)   Resp 18   Ht 1.753 m (5' 9\")   Wt (!) 187.8 kg (414 lb 1.6 oz)   SpO2 100%   BMI 61.15 kg/m²     INTAKE/OUTPUT:    I/O last 3 completed shifts:  In: 2040 [P.O.:2040]  Out: 1275 [Urine:1275]  I/O this shift:  In: 240 [P.O.:240]  Out: -               CONSTITUTIONAL:  fatigued and alert  LUNGS:  clear to auscultation  ABDOMEN:   hypoactive bowel sounds, soft,  obese, tenderness noted in the epigastric region   INCISION: clean, dry, no drainage    Data:  CBC:   Recent Labs     12/28/23  0521 12/28/23  1743 12/29/23  0547   WBC 13.6* 9.6 9.5   HGB 11.5* 10.9* 10.8*   HCT 37.4* 35.9* 35.2*    138 126*     BMP:    Recent Labs     12/28/23  0521 12/28/23  1743 12/29/23  0547   * 133* 133*   K 5.3* 4.9 4.6    100 101   CO2 26 27 26   BUN 22* 26* 29*   CREATININE 1.1 1.3 1.4*   GLUCOSE 130* 141* 109*     Hepatic:   Recent Labs     12/28/23  1743   AST 18   ALT 8*   BILITOT 0.6   ALKPHOS 76     Mag:      Recent Labs     12/29/23  0547   MG 2.10      Phos:     Recent Labs     12/29/23  0547   PHOS 2.6      INR: No results for input(s): \"INR\" in the last 72 hours.      Radiology Review:       ASSESSMENT AND PLAN:  68 y.o. male status post robotic sigmoidectomy for large sigmoid polyp    - CV - appreciate Int Med support for cardiac meds   - Pulm - trying to see if home CPAP will work.  If further input needed, will consult Pulmonary   - GI - advance to full liquids   -  - slightly worsening ANNAMARIA; Hospitalist to get Nephro involvement; will keep Schroeder for now   - PT/OT   - Proph - cont 
       [facility] GENERAL SURGERY    Surgery Progress Note           POD # 3    PATIENT NAME: Ernesto Parnell     TODAY'S DATE: 12/30/2023    INTERVAL HISTORY:    Pt having flatus     OBJECTIVE:   VITALS:  /75   Pulse (!) 109   Temp 98.3 °F (36.8 °C) (Oral)   Resp 20   Ht 1.753 m (5' 9\")   Wt (!) 186.1 kg (410 lb 4.8 oz)   SpO2 98%   BMI 60.59 kg/m²     INTAKE/OUTPUT:    I/O last 3 completed shifts:  In: 1080 [P.O.:1080]  Out: 3200 [Urine:3200]  No intake/output data recorded.              CONSTITUTIONAL:  fatigued and alert  LUNGS:  clear to auscultation  ABDOMEN:   hypoactive bowel sounds, soft,  obese, tenderness noted in the epigastric region   INCISION: clean, dry, no drainage    Data:  CBC:   Recent Labs     12/28/23  0521 12/28/23  1743 12/29/23  0547   WBC 13.6* 9.6 9.5   HGB 11.5* 10.9* 10.8*   HCT 37.4* 35.9* 35.2*    138 126*       BMP:    Recent Labs     12/28/23  0521 12/28/23  1743 12/29/23  0547   * 133* 133*   K 5.3* 4.9 4.6    100 101   CO2 26 27 26   BUN 22* 26* 29*   CREATININE 1.1 1.3 1.4*   GLUCOSE 130* 141* 109*       Hepatic:   Recent Labs     12/28/23 1743   AST 18   ALT 8*   BILITOT 0.6   ALKPHOS 76       Mag:      Recent Labs     12/29/23  0547   MG 2.10        Phos:     Recent Labs     12/29/23  0547   PHOS 2.6        INR: No results for input(s): \"INR\" in the last 72 hours.      Radiology Review:       ASSESSMENT AND PLAN:  68 y.o. male status post robotic sigmoidectomy for large sigmoid polyp    - CV - appreciate Int Med support for cardiac meds   - Pulm - trying to see if home CPAP will work.  If further input needed, will consult Pulmonary   - GI - stay on full liquids for now   -  - slightly worsening ANNAMARIA; Hospitalist to get Nephro involvement; will keep Schroeder for now   - PT/OT   - Proph - cont Lovenox, Pepcid    Discussed benign pathology         Electronically signed by Jame Molina MD     
      Hospitalist Progress Note      PCP: Sandro Collins MD    Date of Admission: 12/27/2023  Current Hospital Day: 4    Chief Admission Complaint: Hypoxia    Presenting History:        Assessment/Plan:    Current Principle Problem:    S/P laparoscopic-assisted sigmoidectomy    Postop day #3 status post robotic sigmoidectomy for large sigmoid polyp postop care per surgery.  Acute kidney injury slightly worsening creatinine today Schroeder in place nephrology consulted and following.  Acute hypoxic respiratory failure continue with supplemental oxygen wean as tolerated obstructive sleep apnea on CPAP at home currently on a BiPAP nightly ,continue the inhaler encourage incentive spirometer.  Encourage activity.  Hypertension continue with home medication.  History of coronary artery disease status post PCI continue with home medication.  Morbid obesity BMI 60.59    DVT Prophylaxis: Lovenox subcu    Recent Labs     12/28/23  0521 12/28/23  1743 12/29/23  0547    138 126*     Diet: ADULT DIET; Full Liquid  Code Status: Full Code      PT/OT Eval Status: Consulted    Dispo -     Subjective: Patient is lying in bed family members at the bedside concerned patient denies any chest pain or shortness of breath no nausea vomiting      Medications:  Reviewed    Infusion Medications    sodium chloride       Scheduled Medications    [START ON 12/31/2023] metoprolol succinate  50 mg Oral Daily    [Held by provider] isosorbide mononitrate  30 mg Oral Daily    sodium chloride flush  5-40 mL IntraVENous 2 times per day    famotidine  20 mg Oral BID    Or    famotidine (PEPCID) injection  20 mg IntraVENous BID    enoxaparin  60 mg SubCUTAneous BID     PRN Meds: simethicone, HYDROcodone 5 mg - acetaminophen, sodium chloride flush, sodium chloride, ondansetron **OR** ondansetron, acetaminophen, aluminum & magnesium hydroxide-simethicone      Intake/Output Summary (Last 24 hours) at 12/30/2023 1225  Last data filed at 12/30/2023 
      Hospitalist Progress Note      PCP: Sandro Collins MD    Date of Admission: 12/27/2023  Current Hospital Day: 5    Chief Admission Complaint: Hypoxia    Presenting History:        Assessment/Plan:    Current Principle Problem:    S/P laparoscopic-assisted sigmoidectomy    Postop day #4 status post robotic sigmoidectomy for large sigmoid polyp postop care per surgery.  Acute kidney injury on admission improved nephrology consulted and following.  Acute hypoxic respiratory failure continue with supplemental oxygen wean as tolerated obstructive sleep apnea on CPAP at home currently on a BiPAP nightly and as needed with worsening ABG, will consult pulmonary ,continue the inhaler encourage incentive spirometer.  Encourage activity.  Elevated procalcitonin level, will add Zosyn for pneumonia though no leukocytosis.  Hypertension continue with home medication.  History of coronary artery disease status post PCI continue with home medication.  Morbid obesity BMI 60.59    DVT Prophylaxis: Lovenox subcu    Recent Labs     12/28/23  1743 12/29/23  0547 12/30/23  1448    126* 136     Diet: ADULT DIET; Full Liquid  Code Status: Full Code      PT/OT Eval Status: Consulted    Dispo -     Subjective: Patient currently on a BiPAP family member at the bedside concerned with the patient change in mental status      Medications:  Reviewed    Infusion Medications    sodium chloride       Scheduled Medications    metoprolol succinate  50 mg Oral Daily    [Held by provider] isosorbide mononitrate  30 mg Oral Daily    sodium chloride flush  5-40 mL IntraVENous 2 times per day    famotidine  20 mg Oral BID    Or    famotidine (PEPCID) injection  20 mg IntraVENous BID    enoxaparin  60 mg SubCUTAneous BID     PRN Meds: simethicone, HYDROcodone 5 mg - acetaminophen, sodium chloride flush, sodium chloride, ondansetron **OR** ondansetron, acetaminophen, aluminum & magnesium hydroxide-simethicone      Intake/Output Summary (Last 
      Hospitalist Progress Note      PCP: Sandro Collins MD    Date of Admission: 12/27/2023  Current Hospital Day: 6    Chief Admission Complaint: Hypoxia       Presenting History:        Assessment/Plan:    Current Principle Problem:    S/P laparoscopic-assisted sigmoidectomy    Postop day #5 status post robotic sigmoidectomy for large sigmoid polyp postop care per surgery.  Acute kidney injury on admission improved nephrology consulted and following.  Acute hypoxic respiratory failure continue with supplemental oxygen wean as tolerated obstructive sleep apnea on CPAP at home currently on a BiPAP nightly and as needed with worsening ABG, pulmonary consulted on 12/31/2023 ,continue the inhaler encourage incentive spirometer.  Encourage activity.  Elevated procalcitonin level, started on Zosyn for pneumonia on 12/31/2023 though no leukocytosis.  Workup in process per  pulmonary  Hypertension continue with home medication.  History of coronary artery disease status post PCI , aortic stenosis, AAA continue with home medication.  Morbid obesity BMI 60.59  History of PE in 2009 was on warfarin then Xarelto until November 2023 held for GI procedure currently on Lovenox subcu    DVT Prophylaxis: Lovenox subcu    Recent Labs     12/30/23  1448 01/01/24  0457    171     Diet: ADULT DIET; Full Liquid  Code Status: Full Code      PT/OT Eval Status: Consulted    Dispo -     Subjective: Patient is lying in the bed currently on a BiPAP family members at the bedside.      Medications:  Reviewed    Infusion Medications    sodium chloride       Scheduled Medications    docusate sodium  100 mg Oral Daily    acetylcysteine  600 mg Inhalation BID RT    piperacillin-tazobactam  4,500 mg IntraVENous Q8H    ipratropium 0.5 mg-albuterol 2.5 mg  1 Dose Inhalation BID RT    metoprolol succinate  50 mg Oral Daily    [Held by provider] isosorbide mononitrate  30 mg Oral Daily    sodium chloride flush  5-40 mL IntraVENous 2 times per 
      Hospitalist Progress Note      PCP: Sandro Collins MD    Date of Admission: 12/27/2023  Current Hospital Day: 8    Chief Admission Complaint: Hypoxia       Presenting History:        Assessment/Plan:    Current Principle Problem:    S/P laparoscopic-assisted sigmoidectomy    Postop day #7 status post robotic sigmoidectomy for large sigmoid polyp postop care per surgery. Pathology results are benign polyp  Acute kidney injury on admission improved nephrology consulted and following.  Acute hypoxic respiratory failure continue with supplemental oxygen wean as tolerated obstructive sleep apnea on CPAP at home currently on a BiPAP nightly and as needed with worsening ABG, pulmonary consulted on 12/31/2023 continue the inhaler encourage incentive spirometer.  Encourage activity.  Elevated procalcitonin level, started on Zosyn for pneumonia on 12/31/2023 though no leukocytosis.  Received a course of IV Lasix yesterday.  Now on room air.  May need BiPAP versus CPAP alone on discharge as patient's CPAP machine has not functioning at home.  Hypertension stable blood pressure is acceptable.  Holding home Imdur for now.  History of coronary artery disease status post PCI , aortic stenosis, AAA continue with home medication.  Morbid obesity  History of PE in 2009 was on warfarin then Xarelto until November 2023 held for GI procedure currently on Lovenox subcu    DVT Prophylaxis: Lovenox subcu    Recent Labs     01/01/24  0457 01/02/24  0506 01/03/24  0512    183 230       Diet: ADULT DIET; Regular; Low Fiber  Code Status: Full Code      PT/OT Eval Status: Consulted.    Dispo -SNF final disposition as per general surgery.    Subjective: Reports he feels well this morning.  Reports he is glad he is off of the oxygen but is worried about needing BiPAP long-term.  Denies any cough, nausea, vomiting, chest pain, shortness of breath.  Reports he is okay with going to rehab in the short-term to get stronger prior to 
   01/01/24 1504   NIV Type   NIV Started/Stopped On   Equipment Type V60   Mode Bilevel   Mask Type Full face mask   Mask Size Large   Assessment   SpO2 98 %   Comfort Level Good   Using Accessory Muscles No   Mask Compliance Good   Skin Protection for O2 Device Yes   Orientation Middle   Location Nose   Intervention(s) Skin Barrier   Settings/Measurements   PIP Observed 21 cm H20   IPAP 20 cmH20   CPAP/EPAP 6 cmH2O   Vt (Measured) 563 mL   Rate Ordered 22   FiO2  30 %   Minute Volume (L/min) 26.3 Liters   Mask Leak (lpm) 41 lpm   Patient's Home Machine No   Alarm Settings   Alarms On Y   Low Pressure (cmH2O) 6 cmH2O   High Pressure (cmH2O) 30 cmH2O   Delay Alarm 20 sec(s)   Apnea (secs) 20 secs   RR Low (bpm) 6   RR High (bpm) 40 br/min   Patient Observation   Observations MEPILEX ON NOSE   Oxygen Therapy/Pulse Ox   O2 Therapy Oxygen   O2 Device PAP (positive airway pressure)       
   01/02/24 0000   NIV Type   $NIV $Daily Charge   NIV Started/Stopped On   Equipment Type v60   Mode Bilevel   Mask Type Full face mask   Mask Size Large   Settings/Measurements   IPAP 20 cmH20   CPAP/EPAP 6 cmH2O   Vt (Measured) 610 mL   Rate Ordered 22   FiO2  30 %   Minute Volume (L/min) 9 Liters   Patient's Home Machine No       
   01/02/24 0330   NIV Type   NIV Started/Stopped On   Equipment Type v60   Mode Bilevel   Mask Type Full face mask   Mask Size Large   Assessment   Respirations 22   Settings/Measurements   IPAP 20 cmH20   CPAP/EPAP 6 cmH2O   Vt (Measured) 529 mL   Rate Ordered 22   FiO2  30 %   Minute Volume (L/min) 12 Liters   Patient's Home Machine No       
   01/02/24 2131   NIV Type   NIV Started/Stopped On   Equipment Type v60   Mode Bilevel   Mask Type Full face mask   Mask Size Large   Assessment   Respirations 23   SpO2 90 %   Comfort Level Good   Using Accessory Muscles No   Mask Compliance Good   Skin Assessment Clean, dry, & intact   Settings/Measurements   PIP Observed 21 cm H20   IPAP 20 cmH20   CPAP/EPAP 6 cmH2O   Vt (Measured) 886 mL   Rate Ordered 22   FiO2  30 %   I Time/ I Time % 0.9 s   Minute Volume (L/min) 15.9 Liters   Mask Leak (lpm) 38 lpm   Patient's Home Machine No   Alarm Settings   Alarms On Y   Low Pressure (cmH2O) 6 cmH2O   High Pressure (cmH2O) 30 cmH2O   RR Low (bpm) 6   RR High (bpm) 40 br/min       
   12/27/23 1528   NIV Type   $NIV $Daily Charge   NIV Started/Stopped On   Equipment Type V60   Mode Bilevel   Mask Type Full face mask   Mask Size Large   Assessment   Respirations 19   SpO2 97 %   Comfort Level Good   Using Accessory Muscles No   Mask Compliance Good   Settings/Measurements   IPAP 14 cmH20   CPAP/EPAP 6 cmH2O   Vt (Measured) 590 mL   Rate Ordered 14   FiO2  30 %   I Time/ I Time % 0.9 s   Minute Volume (L/min) 11.7 Liters   Mask Leak (lpm) 8 lpm   Alarm Settings   Alarms On Y       
   12/27/23 1941   NIV Type   NIV Started/Stopped On   Equipment Type v60   Mode Bilevel   Mask Type Full face mask   Mask Size Large   Assessment   Pulse 74   Respirations 20   SpO2 96 %   Comfort Level Good   Using Accessory Muscles No   Mask Compliance Good   Skin Assessment Clean, dry, & intact   Skin Protection for O2 Device Yes   Orientation Middle   Location Nose   Intervention(s) Skin Barrier   Settings/Measurements   PIP Observed 19 cm H20   IPAP 18 cmH20   CPAP/EPAP 8 cmH2O   Vt (Measured) 812 mL   Rate Ordered 14   FiO2  40 %   I Time/ I Time % 0.9 s   Minute Volume (L/min) 15.9 Liters   Mask Leak (lpm) 6 lpm   Patient's Home Machine No   Alarm Settings   Alarms On Y   Low Pressure (cmH2O) 6 cmH2O   High Pressure (cmH2O) 30 cmH2O   Apnea (secs) 20 secs   RR Low (bpm) 6   RR High (bpm) 40 br/min       
   12/27/23 2213   Oxygen Therapy/Pulse Ox   O2 Therapy Oxygen   $Oxygen $Daily Charge   O2 Device Nasal cannula   O2 Flow Rate (L/min) 4 L/min   Pulse 83   Respirations 18   SpO2 97 %   Blood Gas  Performed? Yes   $ABG $Arterial Puncture   Rigo's Test #1 Pos   Site #1 Right Radial   Site Prepped #1 Yes   Number of Attempts #1 1   Pressure Held #1 Yes   Complications #1 None   Post-procedure #1 Standard   Specimen Status #1 To lab   How Tolerated? Tolerated well       
   12/28/23 0051   NIV Type   $NIV $Daily Charge   NIV Started/Stopped On   Equipment Type v60   Mode Bilevel   Mask Type Full face mask   Assessment   SpO2 98 %   Comfort Level Good   Using Accessory Muscles No   Mask Compliance Good   Skin Assessment Clean, dry, & intact   Skin Protection for O2 Device Yes   Location Nose   Settings/Measurements   PIP Observed 19 cm H20   IPAP 18 cmH20   CPAP/EPAP 8 cmH2O   Vt (Measured) 611 mL   Rate Ordered 16   Insp Rise Time (%) 1 %   FiO2  40 %   I Time/ I Time % 0.9 s   Minute Volume (L/min) 10.3 Liters   Mask Leak (lpm) 2 lpm   Patient's Home Machine No   Alarm Settings   Alarms On Y   Low Pressure (cmH2O) 6 cmH2O   High Pressure (cmH2O) 30 cmH2O   Delay Alarm 20 sec(s)   RR Low (bpm) 6   RR High (bpm) 40 br/min       
   12/28/23 0424   NIV Type   NIV Started/Stopped On   Equipment Type v60   Mode Bilevel   Mask Type Full face mask   Assessment   Respirations 21   SpO2 97 %   Comfort Level Good   Using Accessory Muscles No   Mask Compliance Good   Skin Assessment Clean, dry, & intact   Skin Protection for O2 Device Yes   Location Nose   Settings/Measurements   PIP Observed 20 cm H20   IPAP 18 cmH20   CPAP/EPAP 8 cmH2O   Vt (Measured) 1335 mL   Rate Ordered 16   Insp Rise Time (%) 1 %   FiO2  30 %   I Time/ I Time % 0.9 s   Minute Volume (L/min) 27.7 Liters   Mask Leak (lpm) 10 lpm   Patient's Home Machine No   Alarm Settings   Alarms On Y   Low Pressure (cmH2O) 6 cmH2O   High Pressure (cmH2O) 30 cmH2O   Delay Alarm 20 sec(s)   RR Low (bpm) 6   RR High (bpm) 40 br/min       
   12/28/23 1736   Oxygen Therapy/Pulse Ox   O2 Device Nasal cannula   O2 Flow Rate (L/min) 2 L/min   SpO2 96 %   Blood Gas  Performed? Yes   $ABG $Arterial Puncture   Rigo's Test #1 Pos   Site #1 Right Radial   Site Prepped #1 Yes   Number of Attempts #1 1   Pressure Held #1 Yes   Complications #1 None   Post-procedure #1 Standard   Specimen Status #1 To lab   How Tolerated? Tolerated well       
   12/28/23 2320   NIV Type   NIV Started/Stopped On   Equipment Type v60   Mode Bilevel   Mask Type Full face mask   Assessment   Respirations 24   SpO2 96 %   Comfort Level Good   Using Accessory Muscles No   Mask Compliance Good   Skin Assessment Clean, dry, & intact   Skin Protection for O2 Device Yes   Location Nose   Settings/Measurements   PIP Observed 13 cm H20  (ramp applied for comfort)   IPAP 18 cmH20   CPAP/EPAP 8 cmH2O   Vt (Measured) 536 mL   Rate Ordered 16   Insp Rise Time (%) 1 %   FiO2  28 %   I Time/ I Time % 0.9 s   Minute Volume (L/min) 12.8 Liters   Mask Leak (lpm) 26 lpm   Patient's Home Machine No   Alarm Settings   Alarms On Y   Low Pressure (cmH2O) 6 cmH2O   High Pressure (cmH2O) 30 cmH2O   Delay Alarm 20 sec(s)   RR Low (bpm) 6   RR High (bpm) 40 br/min       
   12/29/23 0356   NIV Type   NIV Started/Stopped On   Equipment Type v60   Mode Bilevel   Mask Type Full face mask   Assessment   Respirations 19   SpO2 100 %   Comfort Level Good   Using Accessory Muscles No   Mask Compliance Good   Skin Assessment Clean, dry, & intact   Skin Protection for O2 Device Yes   Location Nose   Settings/Measurements   PIP Observed 20 cm H20   IPAP 18 cmH20   CPAP/EPAP 8 cmH2O   Vt (Measured) 505 mL   Rate Ordered 16   Insp Rise Time (%) 1 %   FiO2  30 %   I Time/ I Time % 0.9 s   Minute Volume (L/min) 9.4 Liters   Mask Leak (lpm) 0 lpm   Patient's Home Machine No   Electrical Safety Check Performed Yes       
   12/30/23 0056   NIV Type   $NIV $Daily Charge   NIV Started/Stopped On   Equipment Type v60   Mode Bilevel   Mask Type Full face mask   Mask Size Large   Assessment   Respirations 25   SpO2 97 %   Comfort Level Good   Using Accessory Muscles No   Mask Compliance Good   Skin Assessment Clean, dry, & intact   Skin Protection for O2 Device Yes   Orientation Middle   Location Nose   Settings/Measurements   PIP Observed 19 cm H20   IPAP 18 cmH20   CPAP/EPAP 8 cmH2O   Vt (Measured) 548 mL   Rate Ordered 16   FiO2  30 %   I Time/ I Time % 0.9 s   Minute Volume (L/min) 11 Liters   Mask Leak (lpm) 0 lpm   Patient's Home Machine No   Alarm Settings   Alarms On Y   Low Pressure (cmH2O) 6 cmH2O   High Pressure (cmH2O) 30 cmH2O   Apnea (secs) 20 secs   RR Low (bpm) 6   RR High (bpm) 40 br/min       
   12/30/23 1538   NIV Type   NIV Started/Stopped On   Equipment Type V60   Mode Bilevel   Mask Type Full face mask   Mask Size Large   Assessment   Pulse 71   Respirations 18   SpO2 100 %   Using Accessory Muscles No   Mask Compliance Good   Skin Assessment Clean, dry, & intact   Skin Protection for O2 Device Yes   Orientation Middle   Location Nose   Intervention(s) Skin Barrier   Settings/Measurements   PIP Observed 20 cm H20   IPAP 18 cmH20   CPAP/EPAP 8 cmH2O   Vt (Measured) 447 mL   Rate Ordered 16   FiO2  30 %   Mask Leak (lpm) 17 lpm   Patient's Home Machine No   Alarm Settings   Alarms On Y   Low Pressure (cmH2O) 6 cmH2O   High Pressure (cmH2O) 30 cmH2O   Apnea (secs) 20 secs   RR Low (bpm) 6   RR High (bpm) 40 br/min       
   12/30/23 2131   NIV Type   NIV Started/Stopped On   Equipment Type v60.   Mode Bilevel   Mask Type Full face mask   Mask Size Large   Assessment   Respirations 21   SpO2 98 %   Comfort Level Good   Using Accessory Muscles No   Mask Compliance Good   Skin Assessment Clean, dry, & intact   Skin Protection for O2 Device Yes   Orientation Middle   Settings/Measurements   PIP Observed 19 cm H20   IPAP 18 cmH20   CPAP/EPAP 8 cmH2O   Vt (Measured) 470 mL   Rate Ordered 16   FiO2  30 %   I Time/ I Time % 0.9 s   Minute Volume (L/min) 8.2 Liters   Mask Leak (lpm) 23 lpm   Patient's Home Machine No   Alarm Settings   Alarms On Y   Low Pressure (cmH2O) 6 cmH2O   High Pressure (cmH2O) 30 cmH2O   Apnea (secs) 20 secs   RR Low (bpm) 6   RR High (bpm) 40 br/min       
   12/31/23 0023   NIV Type   $NIV $Daily Charge   NIV Started/Stopped On   Equipment Type v60   Mode Bilevel   Mask Type Full face mask   Mask Size Large   Assessment   Respirations 22   Comfort Level Good   Using Accessory Muscles No   Mask Compliance Good   Skin Protection for O2 Device Yes   Orientation Middle   Location Nose   Settings/Measurements   PIP Observed 18 cm H20   IPAP 18 cmH20   CPAP/EPAP 8 cmH2O   Vt (Measured) 429 mL   Rate Ordered 16   FiO2  30 %   I Time/ I Time % 0.9 s   Minute Volume (L/min) 9 Liters   Mask Leak (lpm) 8 lpm   Patient's Home Machine No   Alarm Settings   Alarms On Y   Low Pressure (cmH2O) 6 cmH2O   High Pressure (cmH2O) 30 cmH2O   Apnea (secs) 20 secs   RR Low (bpm) 6   RR High (bpm) 40 br/min       
   12/31/23 1455   RT Protocol   History Pulmonary Disease 0   Respiratory pattern 4   Breath sounds 2   Cough 0   Indications for Bronchodilator Therapy Decreased or absent breath sounds   Bronchodilator Assessment Score 6       
   12/31/23 8307   NIV Type   NIV Started/Stopped On   Equipment Type v60   Mode Bilevel   Mask Type Full face mask   Mask Size Large   Assessment   Respirations 23   SpO2 97 %   Comfort Level Good   Using Accessory Muscles No   Mask Compliance Good   Skin Assessment Clean, dry, & intact   Settings/Measurements   PIP Observed 22 cm H20   IPAP 20 cmH20   CPAP/EPAP 6 cmH2O   Vt (Measured) 463 mL   Rate Ordered 22   FiO2  30 %   I Time/ I Time % 0.9 s   Minute Volume (L/min) 11.7 Liters   Mask Leak (lpm) 51 lpm   Patient's Home Machine No   Alarm Settings   Alarms On Y   Low Pressure (cmH2O) 6 cmH2O   High Pressure (cmH2O) 30 cmH2O   RR Low (bpm) 6   RR High (bpm) 40 br/min       
  Colon SSI Bundle - Pre-Admission Testing Pathway Completed:    Smoking Cessation Education    Chlorhexidine scrub/wash Education    Patient aware of bowel prep instructions ____X____ preop antibiotic ordered ___N/A______ per surgeon office (not ordered at time of PAT visit, message to office)    Mobility instructions for DOS/first post-op day    Blood Glucose Monitoring Education    Initiate Lab Orders for preop    
  Surgery Date and Time: 12/27/23 @ 8:45 am   Arrival Time:  6:45 am    The instructions given when and if a patient needs to stop oral intake prior to surgery varies. Follow the instructions you were given by your    Surgeon or RN during the Pre-op call.       __X__Nothing to eat or to drink after Midnight the night before the surgery. NO gum, mints, candy or ice chips day of surgery. Clear liquids only day    Before surgery, follow instructions from surgeon regarding bowel preop in email.                  Only take the following medications with a small sip of water the morning of surgery:  Metoprolol & Isosorbide only                 Hold the following medications (per anesthesia or surgeon request): Lisinopril (hold 24 hrs prior)       Last Dose:  12/25 am      Aspirin, Ibuprofen, Advil, Naproxen, Vitamin E and other Anti-inflammatory products and supplements should be stopped for 5 -7days before surgery      or as directed by your physician.      Check with your Surgeon/PCP/Cardiologist regarding stopping Plavix, Coumadin, Eliquis, Lovenox, Effient, Pradaxa, Xarelto, Fragmin or other blood thinners     and follow their instructions.  Medication:  Xarelto - last dose 11/2023 cardio aware               Last dose:               - Do not smoke or vape, and do not drink any alcoholic beverages 24 hours prior to surgery, this includes NA Beer. Refrain from using any recreational drugs,     including non-prescribed prescription drugs.     -You may brush your teeth and gargle the morning of surgery.  DO NOT SWALLOW WATER.    -You MUST plan for a responsible adult to stay on site while you are here and take you home after your surgery. You will not be allowed to leave alone or drive               yourself home. It is requested someone stay with you the first 24 hrs. Your surgery will be cancelled if you do not have a ride home with a responsible adult.    -Please wear simple, loose-fitting clothing to the hospital. Do 
0126: Patient is still continuing to take Bipap off frequently due to it being uncomfortable. Perfect served Yrn Traylor NP about obtaining an ABG in the AM.     0515: ABG ordered and obtained for this AM.     0556: Perfectserved Dr. Tracee Pride to let him know the ABG was obtained and to review the results. Patient going back to sleep sand agreeable to be placed back on BiPAP at this time.   
ABG SENT VIA TUBE SYSTEM       01/01/24 1458   Oxygen Therapy/Pulse Ox   O2 Therapy Oxygen   O2 Device Nasal cannula   O2 Flow Rate (L/min) 2 L/min   Blood Gas  Performed? Yes   $ABG $Arterial Puncture   Rigo's Test #1 EILN   Site #1 Right Brachial   Site Prepped #1 Yes   Number of Attempts #1 1   Pressure Held #1 Yes   Complications #1 None   Post-procedure #1 Standard   Specimen Status #1 To lab   How Tolerated? Tolerated well       
Anesthesiologist at bedside to evaluate pt. MD aware of pt's high O2 demand upon arrival to PACU. Orders placed for BiPAP, continuous pulse oximetry, and ABG.   
Ernesto Parnell    Age 68 y.o.    male    1955    MRN [id number]    12/27/2023  Arrival Time_____________  OR Time____________243 Min     Procedure(s):  ROBOTIC SIGMOID RESECTION, POSSIBLE OPEN PROCEDURE                      General   Surgeon(s):  Mcclain, Yoel Ibarra, MD      DAY ADMIT ___  SDS/OP ___  OUTPT IN BED ___        Phone [phone] (home) [phone] (work)    PCP _____________________ Phone_________________ Epic ( ) Epic CE ( ) Appt ________    ADDITIONAL INFO __________________________________ Cardio/Consult _____________    NOTES _____________________________________________________________________    ____________________________________________________________________________    PAT APPT DATE:________ TIME: ________  FAXED QAD: _______  (__) H&P w/ Hospitalist  __________________________________________________________________________  Preop Nurse phone screen complete: _____________  (__) CBC     (__) W/ DIFF ___________     (__) Hgb A1C    ___________  (__) CHEST X RAY   __________  (__) LIPID PROFILE  ___________  (__) EKG   __________  (__) PT-INR / APTT  ___________  (__) PFT's   __________  (__) BMP   ___________  (__) CAROTIDS  __________  (__) CMP   ___________  (__) VEIN MAPPING  __________  (__) U/A   ___________  (__) HISTORY & PHYSICAL __________  (__) URINE C & S  ___________  (__) CARDIAC CLEARANCE __________  (__) U/A W/ FLEX  ___________  (__) PULM. CLEARANCE __________  (__) SERUM PREGNANCY ___________  (__) Check Epic DOS orders __________  (__) TYPE & SCREEN __________repeat ( ) (__)  __________________ __________  (__) Albumin / Prealbumin ___________  (__)  __________________ __________  (__) TRANSFERRIN  ___________  (__)  __________________ __________  (__) LIVER PROFILE  ___________  (__)  __________________ __________  (__) MRSA NASAL SWAB ___________  (__) URINE PREG DOS __________  (__) SED RATE  ___________  (__) BLOOD SUGAR DOS __________  (__) C-REACTIVE 
Family at bedside asking to speak to MD about concerns for patient. This RN has noted times of confusion in patient which is not baseline. Patient forgetfulness and confusion are all new since surgery. RN messaged covering MD Almonte and is at bedside.  
INPATIENT PULMONARY CRITICAL CARE PROGRESS NOTE      Reason for visit    Hypoxemic and hypercapnic respiratory failure    SUBJECTIVE: Patient when seen this morning was comfortably sleeping in the bed without any significant shortness of breath or increased work of breathing, patient was afebrile and he medically maintained, patient was on room air oxygen with saturation 94%, patient uses CPAP to some extent last night, patient has a sinus rhythm on the monitor, patient has had good urine output, patient's cumulative fluid balance is -10 L, patient glycemic control was acceptable, no other pertinent review of system of concern      Physical Exam:  Blood pressure 131/77, pulse 84, temperature 97.4 °F (36.3 °C), temperature source Axillary, resp. rate 18, height 1.753 m (5' 9\"), weight (!) 181.6 kg (400 lb 6.4 oz), SpO2 90 %.'   Constitutional:  No acute distress.   HENT:  Oropharynx is clear and moist. No thyromegaly.  Eyes:  Conjunctivae are normal. Pupils equal, round, and reactive to light. No scleral icterus.   Neck: . No tracheal deviation present. No obvious thyroid mass.  Short enlarged neck  Cardiovascular: Normal rate, regular rhythm, normal heart sounds.  No right ventricular heave. No lower extremity edema.  Pulmonary/Chest: No wheezes.  No significant rales.  Chest wall is not dull to percussion.  No accessory muscle usage or stridor.   Abdominal: Soft. Bowel sounds present.  Distended with surgical scar with mild tenderness.  Obese  Musculoskeletal: No cyanosis. No clubbing. No obvious joint deformity.   Lymphadenopathy: No cervical or supraclavicular adenopathy.   Skin: Skin is warm and dry. No rash or nodules on the exposed extremities.  Neurologic: Sleeping when seen        Results:  CBC:   Recent Labs     01/01/24  0457 01/02/24  0506 01/03/24  0512   WBC 5.5 5.3 5.7   HGB 10.6* 11.1* 10.8*   HCT 34.1* 36.2* 34.6*   MCV 81.5 81.9 79.8*    183 230       BMP:   Recent Labs     01/01/24  0459 
INPATIENT PULMONARY CRITICAL CARE PROGRESS NOTE      Reason for visit    Hypoxemic and hypercapnic respite failure    SUBJECTIVE: Patient when seen this morning was feeling somewhat better, patient states that his abdominal pain has gone down, patient is tolerating clear liquid diet, patient also states that he had a small bowel movement this morning, patient does not have any increasing cough or shortness of breath or chest pain, patient was afebrile and he medically maintained, patient was on 2 L of nasal cannula oxygen with saturation 91% overnight but patient was on room air when seen this morning, patient has had good urine output, patient has a cumulative fluid balance of -9.3 L, patient is glycemic control was acceptable, no other pertinent review of system of concern     Physical Exam:  Blood pressure (!) 152/87, pulse 70, temperature 98.4 °F (36.9 °C), temperature source Oral, resp. rate 22, height 1.753 m (5' 9\"), weight (!) 183.2 kg (403 lb 12.8 oz), SpO2 96 %.'   Constitutional:  No acute distress.   HENT:  Oropharynx is clear and moist. No thyromegaly.  Eyes:  Conjunctivae are normal. Pupils equal, round, and reactive to light. No scleral icterus.   Neck: . No tracheal deviation present. No obvious thyroid mass.  Short enlarged neck  Cardiovascular: Normal rate, regular rhythm, normal heart sounds.  No right ventricular heave. No lower extremity edema.  Pulmonary/Chest: No wheezes.  Minimal l rales.  Chest wall is not dull to percussion.  No accessory muscle usage or stridor.   Abdominal: Soft. Bowel sounds present.  Distended with surgical scar with mild tenderness.  Obese  Musculoskeletal: No cyanosis. No clubbing. No obvious joint deformity.   Lymphadenopathy: No cervical or supraclavicular adenopathy.   Skin: Skin is warm and dry. No rash or nodules on the exposed extremities.  Psychiatric: Normal mood and affect. Behavior is normal.  No anxiety.   Neurologic: Alert, awake and oriented. PERRL.  
Orders to discharge patient home. Removed heplock. Patient tolerated well. Reviewed AVS summary with patient including medications, new prescriptions, diet, activity, follow up appointments and when to call the doctor. Patient verbalized understanding of all material. Awaiting transportation to [facility]    
Patient admitted to [facility] bay 1. Consents verified. Patient NPO since 2200 last night. Patient belongings to remain on PACU cart for procedure.   
Patient off BiPAP @ this time. Patient does not want to go back on BiPAP @ this time.  
Patient on BIPAP from 1030 to 1245  
Patient refusing to wear BiPAP at this time. Patient placed on 4L nasal Canula sating at 97%.  
Patient sleepy. NIV adjustments made at bedside. Sp02 97%  
Patient taken to room 431 by transporter and LEYDA Mariano in stable condition on 15L NRB. Bipap will be brought to room, RT will place back on bipap.   
Patient with sepsis BPA, notified Dr Montanez, no new orders at this time.  
Patient wore PAP from 1500 to 1800  
Per day shift RN, patient was having some confusion this afternoon, Daughter also expressed some concerns with this RN about the confusion. During Day shift at 1752, RT attempted an ABG but were unsuccessful. At 1934, lab was able to obtain a Venous Blood Gas. Yrn Traylor NP notified of daughters concerns and results of VBG.   
Physical Therapy  Facility/Department: [facility] C4 PCU  Daily Treatment Note  NAME: Ernesto Parnell  : 1955  MRN: [id number]    Date of Service: 1/3/2024    Discharge Recommendations:  Subacute/Skilled Nursing Facility   PT Equipment Recommendations  Equipment Needed: No    Patient Diagnosis(es): The primary encounter diagnosis was Postoperative pain. A diagnosis of Adenomatous polyp of sigmoid colon was also pertinent to this visit.    Assessment   Assessment: Pt progressing levels of A with with bed mobility and sit to stand. Able to SPT from bed to chair and stand from chair with RW and A Of 1-2. Pt performed BLE seated exs X 15 reps Pt remained in the chair at EOS.  Activity Tolerance: Patient tolerated treatment well  Equipment Needed: No     Plan    Physical Therapy Plan  General Plan: 3-5 times per week     Restrictions  Restrictions/Precautions  Restrictions/Precautions: Up as Tolerated, General Precautions  Position Activity Restriction  Other position/activity restrictions: IV, purewick     Subjective    Subjective  Subjective: Pt resting in bed, agreeable and motivated  Pain: 6/10 pain in abdomen.     Objective   Vitals:  Vitals:    24    BP: 136/82   Pulse: 85   Resp:    Temp:    SpO2: 94%          Bed Mobility Training  Bed Mobility Training: Yes  Overall Level of Assistance: Contact-guard assistance  Supine to Sit: Contact-guard assistance;Additional time;Assist X1  Scooting: Contact-guard assistance;Additional time;Assist X1  Balance  Sitting: Intact  Standing: Impaired  Standing - Static: Constant support;Good (RW)  Standing - Dynamic: Constant support;Fair (RW)  Transfer Training  Transfer Training: Yes  Interventions: Verbal cues  Sit to Stand: Contact-guard assistance;Assist X2;Adaptive equipment  Stand to Sit: Contact-guard assistance;Assist X2;Adaptive equipment  Stand Pivot Transfers: Contact-guard assistance;Assist X2;Adaptive equipment (w/ stedy bars only)  Bed to Chair: 
Physical Therapy  Facility/Department: [place] [address] [facility]  Daily Treatment Note  NAME: Ernesto Parnell  : 1955  MRN: [id number]    Date of Service: 2023    Discharge Recommendations:  Subacute/Skilled Nursing Facility   PT Equipment Recommendations  Equipment Needed: No  Other: defer  Therapy discharge recommendations take into account each patient's current medical complexities and are subject to input/oversight from the patient's healthcare team.   Barriers to Home Discharge:   [x] Steps to access home entry or bed/bath:   [x] Unable to transfer, ambulate, or propel wheelchair household distances without assist   [x] Limited available assist at home upon discharge    [x] Patient or family requests d/c to post-acute facility    [] Poor cognition/safety awareness for d/c to home alone    []Unable to maintain ordered weight bearing status    [] Patient with salient signs of long-standing immobility   [] Patient is at risk for falls due to:   [] Other:    If pt is unable to be seen after this session, please let this note serve as discharge summary.  Please see case management note for discharge disposition.  Thank you.    Patient Diagnosis(es): The encounter diagnosis was Adenomatous polyp of sigmoid colon.    Assessment   Assessment: Pt progressing with bed mobility and sit to stand. Able to SPT from bed to chair and stand from chair with Ax2. Pt remained in the chair at EOS.  Activity Tolerance: Patient limited by fatigue  Equipment Needed: No  Other: defer     Plan    Physical Therapy Plan  General Plan: 3-5 times per week  Current Treatment Recommendations: Strengthening;Balance training;Gait training;Functional mobility training;Transfer training;Endurance training;Pain management;Equipment evaluation, education, & procurement;Patient/Caregiver education & training;Safety education & training     Restrictions  Restrictions/Precautions  Restrictions/Precautions: Up as Tolerated  Position Activity 
Pt brought to PACU. Report obtained from OR RN and anesthesia. Pt placed on monitor and O2 at 8lpm via simple mask. Pt with known history of LEESA. Vitals taken at this time are below:  Vitals:    12/27/23 1352   BP: (!) 111/50   Pulse: 82   Resp: 20   Temp: 97 °F (36.1 °C)   SpO2: 96%     Pt awake and able to follow commands upon arrival to PACU. Pt denies complaints of pain at this time. Pt's surgical site assessed; noted to have 5 trochar incisions to his abdomen. Incisions are clean, dry, and intact.   
Pt called out requesting water. Pt had taken bi pap off. Pt's O2 placed back on him. Currently pt is off BiPap and on O2 per his request.  
Pt complaining of abdominal pain that is 8/10 aching/soreness. See MAR for medication administration. Pt's O2 able to be weaned to 4lpm via nasal cannula. Pt's SpO2 maintaining >92% on 4lpm. Pt placed in trendelenburg position and IVF opened wide to gravity to help support BP which has been soft.   
Pt on his home unit with O2 bled in line 2L with Sats of 93%, pt resting comfortably at this time.  Expressed to pt if he desats with his home unit of has mouth open I would like him to go back to out hosp unit with full face mask.  Pt is also on continuous pulse oximetry       
Pt placed back onto simple mask and O2 increased to 6lpm to maintain SpO2>90%. Pt's O2 increased d/t desatting to 87% on 4lpm with nasal cannula.   
Pt's wife and daughter brought to the bedside and provided with updates.   
RN to bedside to assess patient, family noted that patient was confused and not aware of what was going on. Upon RN assessment patient knew where he was, what year, who he was but thought it was June, and the last major holiday was Easter. RN messaged covering MD Matt to come to bedside. MD to bedside to assess as well. Pain medications adjusted due to concern for possible delirium. RN also noted decreased urine output throughout shift, MD made aware and bladder scans preformed throughout shift, chong in place. Respiratory at bedside to draw ABG to check CO2. VSS. CLWR. Family at bedside.  
RT at bedside. BiPAP mask applied.   
Report called to C4 RN Allie who will assume care of pt when he arrives to his assigned room #431. All questions answered at this time. Request for transport placed.   
tenderness, non distended.           
36.2*    171 183       Recent Labs     12/30/23  1448 01/01/24  0457 01/02/24  0507    139 137   K 4.6 4.5 4.2    103 98*   CO2 30 29 32   BUN 21* 13 11   CREATININE 1.0 0.8 0.9   CALCIUM 8.2* 8.3 8.4       No results for input(s): \"AST\", \"ALT\", \"BILIDIR\", \"BILITOT\", \"ALKPHOS\" in the last 72 hours.  No results for input(s): \"INR\" in the last 72 hours.  No results for input(s): \"CKTOTAL\", \"TROPHS\" in the last 72 hours.    Urinalysis:      Lab Results   Component Value Date/Time    NITRU Negative 06/16/2020 12:15 AM    WBCUA 3-5 06/16/2020 12:15 AM    BACTERIA Rare 06/16/2020 12:15 AM    RBCUA 11-20 06/16/2020 12:15 AM    BLOODU SMALL 06/16/2020 12:15 AM    SPECGRAV 1.015 06/16/2020 12:15 AM    GLUCOSEU Negative 06/16/2020 12:15 AM       Consults:    IP CONSULT TO HOSPITALIST  IP CONSULT TO NEPHROLOGY  IP CONSULT TO PULMONOLOGY      Appropriate for A1 Discharge Unit: Shaylee Lee MD  
done to assess if patient needs to be titrated in terms of BiPAP parameters  Patient has been started on IV Zosyn by admitting provider  Further titration as antimicrobials per clinical status and cultures   Keep negative fluid balance  Patient got 1 dose of Lasix  Monitor input output and BMP  Correct electrolytes on whenever necessary basis  Patient does have history of LEESA and patient states that his CPAP machine is not working at home and will discuss with case management to do the needful  Monitor cardiac rhythm and hemodynamics closely  PUD and DVT prophylaxis    Case discussed with patient and nursing            Electronically signed by:  SHANNA SKY MD    1/1/2024    8:14 PM.     
-3.0 - 3.0 mmol/L    Hemoglobin, Art, Extended 11.8 (L) 13.5 - 17.5 g/dL    O2 Sat, Arterial 96.8 >92 %    Carboxyhgb, Arterial 0.8 0.0 - 1.5 %    Methemoglobin, Arterial 0.5 <1.5 %    TCO2, Arterial 27.4 Not Established mmol/L    O2 Therapy See comment    CBC with Auto Differential    Collection Time: 12/29/23  5:47 AM   Result Value Ref Range    WBC 9.5 4.0 - 11.0 K/uL    RBC 4.30 4.20 - 5.90 M/uL    Hemoglobin 10.8 (L) 13.5 - 17.5 g/dL    Hematocrit 35.2 (L) 40.5 - 52.5 %    MCV 81.8 80.0 - 100.0 fL    MCH 25.0 (L) 26.0 - 34.0 pg    MCHC 30.6 (L) 31.0 - 36.0 g/dL    RDW 19.7 (H) 12.4 - 15.4 %    Platelets 126 (L) 135 - 450 K/uL    MPV 7.4 5.0 - 10.5 fL    Neutrophils % 85.3 %    Lymphocytes % 7.2 %    Monocytes % 6.8 %    Eosinophils % 0.5 %    Basophils % 0.2 %    Neutrophils Absolute 8.1 (H) 1.7 - 7.7 K/uL    Lymphocytes Absolute 0.7 (L) 1.0 - 5.1 K/uL    Monocytes Absolute 0.6 0.0 - 1.3 K/uL    Eosinophils Absolute 0.0 0.0 - 0.6 K/uL    Basophils Absolute 0.0 0.0 - 0.2 K/uL   Basic Metabolic Panel    Collection Time: 12/29/23  5:47 AM   Result Value Ref Range    Sodium 133 (L) 136 - 145 mmol/L    Potassium 4.6 3.5 - 5.1 mmol/L    Chloride 101 99 - 110 mmol/L    CO2 26 21 - 32 mmol/L    Anion Gap 6 3 - 16    Glucose 109 (H) 70 - 99 mg/dL    BUN 29 (H) 7 - 20 mg/dL    Creatinine 1.4 (H) 0.8 - 1.3 mg/dL    Est, Glom Filt Rate 54 (A) >60    Calcium 7.9 (L) 8.3 - 10.6 mg/dL   Magnesium    Collection Time: 12/29/23  5:47 AM   Result Value Ref Range    Magnesium 2.10 1.80 - 2.40 mg/dL   Phosphorus    Collection Time: 12/29/23  5:47 AM   Result Value Ref Range    Phosphorus 2.6 2.5 - 4.9 mg/dL        Imaging/Diagnostics Last 24 Hours   XR CHEST PORTABLE    Result Date: 12/28/2023  EXAMINATION: ONE XRAY VIEW OF THE CHEST 12/27/2023 7:45 pm COMPARISON: Rayne 15, 2020 HISTORY: ORDERING SYSTEM PROVIDED HISTORY: hypoxia TECHNOLOGIST PROVIDED HISTORY: Reason for exam:->hypoxia Reason for Exam: hypoxia FINDINGS: Bilateral 
Assessment  AROM: Grossly decreased, non-functional  PROM: Grossly decreased, non-functional  Strength: Grossly decreased, non-functional  Coordination: Generally decreased, functional  Tone: Normal  Sensation: Intact        Strength RLE  Comment: grossly 3/5  Strength LLE  Comment: grossly 3/5        Bed Mobility Training  Bed Mobility Training: Yes  Overall Level of Assistance: Assist X2;Maximum assistance  Interventions: Verbal cues  Supine to Sit: Assist X2;Maximum assistance  Sit to Supine: Assist X2;Maximum assistance  Scooting: Total assistance  Balance  Sitting: Impaired  Sitting - Static: Fair (occasional)  Sitting - Dynamic: Fair (occasional)  Standing: Impaired  Standing - Static: Constant support  Standing - Dynamic: Constant support  Transfer Training  Transfer Training: Yes  Sit to Stand: Moderate assistance  Stand to Sit: Moderate assistance  Gait Training  Gait Training: No                   AM-PAC - Mobility    -PAC Basic Mobility - Inpatient   How much help is needed turning from your back to your side while in a flat bed without using bedrails?: A Lot  How much help is needed moving from lying on your back to sitting on the side of a flat bed without using bedrails?: A Lot  How much help is needed moving to and from a bed to a chair?: Total  How much help is needed standing up from a chair using your arms?: Total  How much help is needed walking in hospital room?: Total  How much help is needed climbing 3-5 steps with a railing?: Total  AM-PAC Inpatient Mobility Raw Score : 8  AM-PAC Inpatient T-Scale Score : 28.52  Mobility Inpatient CMS 0-100% Score: 86.62  Mobility Inpatient CMS G-Code Modifier : CM         Goals  Short Term Goals  Time Frame for Short Term Goals: 1/2/24  Short Term Goal 1: Pt will complete bed mobility with Mod A  Short Term Goal 2: Pt will ambulate x 10' with RW with min A  Short Term Goal 3: Pt will sit in the chair x 60 minutes without fatigue by 12/31  Short Term Goal 4: 
Dependent/Total  Toileting: Dependent/Total  Toileting Skilled Clinical Factors: chong       Activity Tolerance  Activity Tolerance: Patient limited by pain;Patient limited by endurance          Vision  Vision: Impaired  Vision Exceptions: Wears glasses at all times  Hearing  Hearing: Exceptions to WFL  Hearing Exceptions: Hard of hearing/hearing concerns    Cognition  Overall Cognitive Status: WFL  Orientation  Overall Orientation Status: Within Functional Limits  Orientation Level: Oriented X4                 A/AROM Exercises: x15 reps shoulder flex/ext, elbow flex/ext, pronation/supination, gross grasp/release    Education Given To: Patient;Family  Education Provided: Role of Therapy;Plan of Care;Transfer Training;ADL Adaptive Strategies  Education Provided Comments: Disease specific: role of therapy, Pt educated on benefits of OOB activity to decrease risks associated with prolonged bedrest while in hospital. Pt verb understanding.    Education Method: Demonstration;Verbal  Barriers to Learning: None  Education Outcome: Verbalized understanding;Demonstrated understanding                      AM-PAC - ADL  AM-PAC Daily Activity - Inpatient   How much help is needed for putting on and taking off regular lower body clothing?: Total  How much help is needed for bathing (which includes washing, rinsing, drying)?: A Lot  How much help is needed for toileting (which includes using toilet, bedpan, or urinal)?: Total  How much help is needed for putting on and taking off regular upper body clothing?: A Little  How much help is needed for taking care of personal grooming?: A Little  How much help for eating meals?: None  AM-[facility] Inpatient Daily Activity Raw Score: 14  AM-PAC Inpatient ADL T-Scale Score : 33.39  ADL Inpatient CMS 0-100% Score: 59.67  ADL Inpatient CMS G-Code Modifier : CK       Goals  Short Term Goals  Time Frame for Short Term Goals: 1 week (1/4) unless otherwise specified  Short Term Goal 1: Pt will 
Goal 2: Pt will complete LE dressing with min A  Short Term Goal 3: Pt will complete 15-20 reps x5 planes BUE therex to increase indep in ADLs by 1/1/24: GOAL MET 01/03: completed 4 x 15 BUE AROM exercises seated in chair.  Short Term Goal 4: Pt will complete 2-3 grooming ADLs seated EOB with SPV- MET 12/31/23 washed face/ oral hygiene set-up  Short Term Goal 5: complete stand pivot with mod Ax1: GOAL MET 01/03  Patient Goals   Patient goals : \"get out of bed\" -MET    AM-PAC - ADL  AM-PAC Daily Activity - Inpatient   How much help is needed for putting on and taking off regular lower body clothing?: Total  How much help is needed for bathing (which includes washing, rinsing, drying)?: A Lot  How much help is needed for toileting (which includes using toilet, bedpan, or urinal)?: Total  How much help is needed for putting on and taking off regular upper body clothing?: A Little  How much help is needed for taking care of personal grooming?: A Little  How much help for eating meals?: None  AM-[facility] Inpatient Daily Activity Raw Score: 14  AM-PAC Inpatient ADL T-Scale Score : 33.39  ADL Inpatient CMS 0-100% Score: 59.67  ADL Inpatient CMS G-Code Modifier : CK    Therapy Time   Individual Concurrent Group Co-treatment   Time In       1059   Time Out       1140   Minutes       41   Timed Code Treatment Minutes: 41 Minutes     Flori Jo OT     
Minutes       Koffi Menon, OT

## 2024-01-04 NOTE — DISCHARGE SUMMARY
Surgery Discharge Summary    Patient Identification  Ernesto Parnell is a 68 y.o. male.  :  1955  Admit Date:  2023    Discharge date:   1/3/2024  4:19 PM                                   Disposition: SNF    Discharge Diagnoses:   Principal Problem:    S/P laparoscopic-assisted sigmoidectomy  Active Problems:    Morbid obesity with BMI of 50.0-59.9, adult (HCC)    Acute respiratory failure with hypercapnia (HCC)    LEESA (obstructive sleep apnea)    Pulmonary venous congestion    Normocytic normochromic anemia    Elevated procalcitonin  Resolved Problems:    * No resolved hospital problems. *      Discharge condition: good    Discharge Medications:     Discharge Medication List as of 1/3/2024  1:55 PM        CONTINUE these medications which have NOT CHANGED    Details   potassium chloride (KLOR-CON) 20 MEQ packet Take 20 mEq by mouth dailyHistorical Med      ferrous sulfate (IRON 325) 325 (65 Fe) MG tablet Take 1 tablet by mouth daily (with breakfast), Disp-30 tablet, R-3Normal      rivaroxaban (XARELTO) 20 MG TABS tablet Take 1 tablet by mouth daily Do not take Xarelto until you follow up with surgery, Disp-30 tablet, R-0Normal      isosorbide mononitrate (IMDUR) 30 MG extended release tablet TAKE 1 TABLET BY MOUTH EVERY DAY, Disp-2 tablet, R-0Normal      atorvastatin (LIPITOR) 40 MG tablet Take 1 tablet by mouth nightly, Disp-30 tablet, R-3Adjust Sig      lisinopril (PRINIVIL;ZESTRIL) 20 MG tablet Take 2 tablets by mouth daily, Disp-30 tablet, R-3Adjust Sig      metoprolol succinate (TOPROL XL) 50 MG extended release tablet Take 1 tablet by mouth dailyHistorical Med      furosemide (LASIX) 40 MG tablet Take one tablet daily, Disp-90 tablet, R-3Normal                Discharge Medication List as of 1/3/2024  1:55 PM        START taking these medications    Details   docusate sodium (COLACE, DULCOLAX) 100 MG CAPS Take 100 mg by mouth daily, Disp-30 capsule, R-0Stop if having diarrheaNormal

## 2024-01-12 ENCOUNTER — OFFICE VISIT (OUTPATIENT)
Dept: SURGERY | Age: 69
End: 2024-01-12

## 2024-01-12 VITALS
BODY MASS INDEX: 59.1 KG/M2 | HEIGHT: 69 IN | SYSTOLIC BLOOD PRESSURE: 95 MMHG | DIASTOLIC BLOOD PRESSURE: 65 MMHG | HEART RATE: 101 BPM

## 2024-01-12 DIAGNOSIS — Z09 POSTOP CHECK: Primary | ICD-10-CM

## 2024-01-12 PROCEDURE — 99024 POSTOP FOLLOW-UP VISIT: CPT | Performed by: SURGERY

## 2024-01-12 RX ORDER — MULTIVIT-MIN/IRON/FOLIC ACID/K 18-600-40
CAPSULE ORAL
COMMUNITY

## 2024-01-13 NOTE — PROGRESS NOTES
Surgery Post-op Progress Note    HPI:  Notes reviewed, and agree with documentation in pt's chart.   Postoperative Follow-up: Patient presents for 2 week follow-up status post robotic sigmoidectomy for benign polyp.  Hospital course focused on difficult pulmonary management.  Discharged to SNF for continued rehab . Eating a regular diet without difficulty. Bowel movements are  loose, frequent .  The patient is not having any pain.    He has been having drainage from one of the upper midline trocar sites.    ROS:    10 point review of systems performed; please refer to HPI with pertinent positives, all other ROS are negative    A review of the patient's record including allergies, medication list, tobacco history, family history, problem list, medical history and social history has been completed and updates made to the patient's EMR where indicated.     PE:   CONSTITUTIONAL:  awake and alert    ABDOMEN: soft, non-distended, non-tender     INCISION: trocar sites all healing well except the R epigastric lesion, has bulla w/ serous drainage on the overlying dressing, no pus, no erythema.  Adjacent extraction incision clean, dry      ASSESSMENT:   Diagnosis Orders   1. Postop check              PLAN:    Continue with routine wound care as discussed  Gradually increase activities as tolerated  Follow-up in 2 weeks or as needed; please call with questions or concerns

## 2024-01-13 NOTE — PATIENT INSTRUCTIONS
Continue with routine wound care as discussed  Gradually increase activities as tolerated  Follow-up in 2 weeks or  as needed; please call with questions or concerns

## 2024-01-26 ENCOUNTER — OFFICE VISIT (OUTPATIENT)
Dept: SURGERY | Age: 69
End: 2024-01-26

## 2024-01-26 VITALS
DIASTOLIC BLOOD PRESSURE: 64 MMHG | HEART RATE: 139 BPM | BODY MASS INDEX: 59.1 KG/M2 | HEIGHT: 69 IN | SYSTOLIC BLOOD PRESSURE: 110 MMHG | TEMPERATURE: 97.5 F

## 2024-01-26 DIAGNOSIS — Z09 POSTOP CHECK: Primary | ICD-10-CM

## 2024-01-26 PROCEDURE — 99024 POSTOP FOLLOW-UP VISIT: CPT | Performed by: SURGERY

## 2024-01-26 NOTE — PATIENT INSTRUCTIONS
Continue with routine wound care as discussed  Gradually increase activities as tolerated  Follow-up as needed; please call with questions or concerns

## 2024-01-26 NOTE — PROGRESS NOTES
Surgery Post-op Progress Note    HPI:  Notes reviewed, and agree with documentation in pt's chart.   Postoperative Follow-up: Patient presents for 4 week follow-up status post robotic sigmoidectomy for benign polyp. Hospital course focused on difficult pulmonary management. Discharged to SNF for continued rehab.  Since his last visit two weeks ago he notes continued improvement with his physical therapy, strength, motility.  He is slated to go home tomorrow . Eating a regular diet without difficulty. Bowel movements are  still loose, multiple but without blood.  He denies abdominal pain, bloating or straining to void.  He does note that the ECF is still giving him daily Miralax and Colace .  The patient is not having any pain..     ROS:    10 point review of systems performed; please refer to HPI with pertinent positives, all other ROS are negative    A review of the patient's record including allergies, medication list, tobacco history, family history, problem list, medical history and social history has been completed and updates made to the patient's EMR where indicated.     PE:   CONSTITUTIONAL:  awake and alert    ABDOMEN: soft, non-distended, non-tender     INCISION: clean, dry, no drainage, healed - the prior draining incision in the RUQ has dried up and is healing well.      ASSESSMENT:   Diagnosis Orders   1. Postop check              PLAN:    Suggested he stop the Miralax and Colace to see if that will help the consistency of his stools  If they are still loose, he can also use Imodium as needed  Continue with routine wound care as discussed  Gradually increase activities as tolerated  Follow-up as needed; please call with questions or concerns  Follow up with GI for recommended interval colonoscopy to ensure no further polyps develop.

## 2024-04-09 ENCOUNTER — INITIAL CONSULT (OUTPATIENT)
Dept: SURGERY | Age: 69
End: 2024-04-09
Payer: MEDICARE

## 2024-04-09 ENCOUNTER — TELEPHONE (OUTPATIENT)
Dept: SURGERY | Age: 69
End: 2024-04-09

## 2024-04-09 VITALS
DIASTOLIC BLOOD PRESSURE: 83 MMHG | WEIGHT: 315 LBS | BODY MASS INDEX: 46.65 KG/M2 | HEIGHT: 69 IN | HEART RATE: 75 BPM | SYSTOLIC BLOOD PRESSURE: 161 MMHG

## 2024-04-09 DIAGNOSIS — K43.2 INCISIONAL HERNIA, WITHOUT OBSTRUCTION OR GANGRENE: Primary | ICD-10-CM

## 2024-04-09 DIAGNOSIS — Z01.812 PRE-PROCEDURE LAB EXAM: ICD-10-CM

## 2024-04-09 PROCEDURE — 1036F TOBACCO NON-USER: CPT | Performed by: SURGERY

## 2024-04-09 PROCEDURE — 3077F SYST BP >= 140 MM HG: CPT | Performed by: SURGERY

## 2024-04-09 PROCEDURE — 3017F COLORECTAL CA SCREEN DOC REV: CPT | Performed by: SURGERY

## 2024-04-09 PROCEDURE — 99213 OFFICE O/P EST LOW 20 MIN: CPT | Performed by: SURGERY

## 2024-04-09 PROCEDURE — G8427 DOCREV CUR MEDS BY ELIG CLIN: HCPCS | Performed by: SURGERY

## 2024-04-09 PROCEDURE — 1123F ACP DISCUSS/DSCN MKR DOCD: CPT | Performed by: SURGERY

## 2024-04-09 PROCEDURE — G8417 CALC BMI ABV UP PARAM F/U: HCPCS | Performed by: SURGERY

## 2024-04-09 PROCEDURE — 3079F DIAST BP 80-89 MM HG: CPT | Performed by: SURGERY

## 2024-04-09 NOTE — TELEPHONE ENCOUNTER
Called and spoke w/ pts wife - Pt scheduled for a CT Abd/Pel w/ IV only Contrast on Mon 4/15/24 @ 11 am arrival 10am [facility] - NPO 4 hours prior to procedure - Pts wife understood and agreed w/ above noted

## 2024-04-10 NOTE — PROGRESS NOTES
defect    2) Will refer to Complex Hernia Service at [facility] to consider repair with possible component separation/TAR technique.  Appreciate their input and assistance in this challenging case.         SHAHAB GAMINO MD  
No

## 2024-04-15 ENCOUNTER — HOSPITAL ENCOUNTER (OUTPATIENT)
Dept: CT IMAGING | Age: 69
Discharge: HOME | End: 2024-04-15
Attending: SURGERY
Payer: MEDICARE

## 2024-04-15 ENCOUNTER — TELEPHONE (OUTPATIENT)
Dept: SURGERY | Age: 69
End: 2024-04-15

## 2024-04-15 DIAGNOSIS — K43.2 INCISIONAL HERNIA, WITHOUT OBSTRUCTION OR GANGRENE: ICD-10-CM

## 2024-04-15 LAB
PERFORMED ON: NORMAL
POC CREATININE: 1 MG/DL (ref 0.8–1.3)
POC SAMPLE TYPE: NORMAL

## 2024-04-15 PROCEDURE — 74177 CT ABD & PELVIS W/CONTRAST: CPT

## 2024-04-15 PROCEDURE — 6360000004 HC RX CONTRAST MEDICATION: Performed by: SURGERY

## 2024-04-15 PROCEDURE — 82565 ASSAY OF CREATININE: CPT

## 2024-04-15 RX ADMIN — IOPAMIDOL 75 ML: 755 INJECTION, SOLUTION INTRAVENOUS at 11:28

## 2024-04-15 NOTE — TELEPHONE ENCOUNTER
Pt's wife 'Lenore' called and said that he had the CT Scan done today 4/15. They would like to know the results and what the next steps will be asap. She said the lump continues to grow and now he is also having lower left quadrant pain. She also said that in his 'My Chart' it states that he doesn't have a history of hernia when he has had before. He saw Dr. Mcclain in office 4/9. Please call her # [phone] or if you can't contact her, call their daughter 'Milagros' (who is on his HIPAA also) # [phone]. I did inform her that Dr. Mcclain was out of office today but would be in tomorrow. Thank you!

## 2024-05-09 ENCOUNTER — INITIAL CONSULT (OUTPATIENT)
Dept: BARIATRICS/WEIGHT MGMT | Age: 69
End: 2024-05-09

## 2024-05-09 VITALS
SYSTOLIC BLOOD PRESSURE: 167 MMHG | HEIGHT: 69 IN | BODY MASS INDEX: 46.65 KG/M2 | HEART RATE: 78 BPM | DIASTOLIC BLOOD PRESSURE: 96 MMHG | WEIGHT: 315 LBS

## 2024-05-09 DIAGNOSIS — E66.01 MORBID OBESITY WITH BMI OF 60.0-69.9, ADULT (HCC): ICD-10-CM

## 2024-05-09 DIAGNOSIS — K43.2 INCISIONAL HERNIA, WITHOUT OBSTRUCTION OR GANGRENE: Primary | ICD-10-CM

## 2024-05-09 RX ORDER — VALSARTAN 80 MG/1
40 TABLET ORAL
COMMUNITY
Start: 2024-04-10

## 2024-05-10 NOTE — PROGRESS NOTES
[facility]   General & Laparoscopic Surgery  [employer]       HPI:    Ernesto Parnell is very pleasant 68 y.o. male who is referred by Sandro Auguste   for consultation with regards Abdominal Bulge    Patient first noted this bulge after recent small intestine surgery in 2023 after colostomy reversal. Patient reports he has abdominal discomfort in the area, and his bowels are working well.  The bulge is not reducible.   Patient denies any nausea, vomiting, fevers, chills, shortness of breath, chest pain, cough, constipation or difficulty urinating.    Hx of insertion of Neo filter.  Hx of gastric bypass in 2014. Highest weight around 445 lbs, lowest weight around 390 lbs.   Hx of umbilical hernia repair.   Currently not of blood thinners or chronic steroids, non smoker, non drinker.     Past Medical History:   Diagnosis Date    AAA (abdominal aortic aneurysm) (HCC)     4.5 cm    Aortic root dilatation (HCC)     Aortic valve stenosis     Arthritis     knees    CAD (coronary artery disease)     Depressive disorder     Encounter for current long-term use of anticoagulants     YUAN (generalized anxiety disorder)     H/O gastric bypass 07/2014    History of blood transfusion     Hypercapnia     Hyperlipidemia     Hypertension     Obesity     Panic attacks     PE (pulmonary embolism) 2010    PVD (peripheral vascular disease) (HCC)     Sleep apnea     CPAP currently broken    Unsteady gait     uses walker     Past Surgical History:   Procedure Laterality Date    CHOLECYSTECTOMY  2009    COLONOSCOPY N/A 11/07/2023    COLONOSCOPY POLYPECTOMY REMOVAL HOT BIOPSY/STOMA performed by Yoel Suarez MD at [facility] ENDOSCOPY    COLONOSCOPY N/A 11/07/2023    COLONOSCOPY CONTROL HEMORRHAGE performed by Yoel Suarez MD at [facility] ENDOSCOPY    COLONOSCOPY  11/07/2023    COLONOSCOPY SUBMUCOSAL INJECTION performed by Yoel Suarez MD at [facility] ENDOSCOPY    CORONARY STENT PLACEMENT

## 2024-11-29 NOTE — PROGRESS NOTES
[facility]   General & Laparoscopic Surgery  [employer]       HPI:    Ernesto Parnell is very pleasant 69 y.o. male who is following up in regards to weight, and incisional hernia 5/9/2024    Patient reports his hernia has began getting bigger, he has been experiencing mobility issues more recently and has continued to gain weight.  Patient denies nausea, vomiting, fever, chills, constipation, and abdominal pain.  Hernia is reducible.     Past Medical History:   Diagnosis Date    AAA (abdominal aortic aneurysm) (HCC)     4.5 cm    Aortic root dilatation (HCC)     Aortic valve stenosis     Arthritis     knees    CAD (coronary artery disease)     Depressive disorder     Encounter for current long-term use of anticoagulants     YUAN (generalized anxiety disorder)     H/O gastric bypass 07/2014    History of blood transfusion     Hypercapnia     Hyperlipidemia     Hypertension     Obesity     Panic attacks     PE (pulmonary embolism) 2010    PVD (peripheral vascular disease) (HCC)     Sleep apnea     CPAP currently broken    Unsteady gait     uses walker     Past Surgical History:   Procedure Laterality Date    CHOLECYSTECTOMY  2009    COLONOSCOPY N/A 11/07/2023    COLONOSCOPY POLYPECTOMY REMOVAL HOT BIOPSY/STOMA performed by Yoel Suarez MD at [facility] ENDOSCOPY    COLONOSCOPY N/A 11/07/2023    COLONOSCOPY CONTROL HEMORRHAGE performed by Yeol Suarez MD at [facility] ENDOSCOPY    COLONOSCOPY  11/07/2023    COLONOSCOPY SUBMUCOSAL INJECTION performed by Yoel Suarez MD at [facility] ENDOSCOPY    CORONARY STENT PLACEMENT  01/2023    GASTRIC BYPASS SURGERY  2014    HERNIA REPAIR  08/01/2017    umbilical hernia    NASAL SEPTUM SURGERY      repair of deviated septum    OTHER SURGICAL HISTORY  07/15/2014    insertion Neo filter    SMALL INTESTINE SURGERY N/A 12/27/2023    ROBOTIC SIGMOID COLON RESECTION performed by Yoel Mcclain MD at [facility] OR     Family History   Problem

## 2024-12-02 ENCOUNTER — OFFICE VISIT (OUTPATIENT)
Dept: BARIATRICS/WEIGHT MGMT | Age: 69
End: 2024-12-02
Payer: MEDICARE

## 2024-12-02 VITALS
WEIGHT: 315 LBS | HEIGHT: 69 IN | HEART RATE: 88 BPM | BODY MASS INDEX: 46.65 KG/M2 | SYSTOLIC BLOOD PRESSURE: 157 MMHG | DIASTOLIC BLOOD PRESSURE: 78 MMHG

## 2024-12-02 DIAGNOSIS — K43.2 INCISIONAL HERNIA, WITHOUT OBSTRUCTION OR GANGRENE: Primary | ICD-10-CM

## 2024-12-02 DIAGNOSIS — E66.01 MORBID OBESITY WITH BMI OF 60.0-69.9, ADULT: ICD-10-CM

## 2024-12-02 PROCEDURE — 3078F DIAST BP <80 MM HG: CPT | Performed by: SURGERY

## 2024-12-02 PROCEDURE — G8427 DOCREV CUR MEDS BY ELIG CLIN: HCPCS | Performed by: SURGERY

## 2024-12-02 PROCEDURE — 1159F MED LIST DOCD IN RCRD: CPT | Performed by: SURGERY

## 2024-12-02 PROCEDURE — G8484 FLU IMMUNIZE NO ADMIN: HCPCS | Performed by: SURGERY

## 2024-12-02 PROCEDURE — 3077F SYST BP >= 140 MM HG: CPT | Performed by: SURGERY

## 2024-12-02 PROCEDURE — 3017F COLORECTAL CA SCREEN DOC REV: CPT | Performed by: SURGERY

## 2024-12-02 PROCEDURE — 99214 OFFICE O/P EST MOD 30 MIN: CPT | Performed by: SURGERY

## 2024-12-02 PROCEDURE — G8417 CALC BMI ABV UP PARAM F/U: HCPCS | Performed by: SURGERY

## 2024-12-02 PROCEDURE — 1123F ACP DISCUSS/DSCN MKR DOCD: CPT | Performed by: SURGERY

## 2024-12-02 PROCEDURE — 1036F TOBACCO NON-USER: CPT | Performed by: SURGERY

## 2024-12-02 RX ORDER — ALBUTEROL SULFATE 90 UG/1
2 INHALANT RESPIRATORY (INHALATION) EVERY 6 HOURS PRN
COMMUNITY
Start: 2024-09-16

## 2024-12-02 RX ORDER — FLUTICASONE PROPIONATE AND SALMETEROL 500; 50 UG/1; UG/1
500 POWDER RESPIRATORY (INHALATION) 2 TIMES DAILY
COMMUNITY
Start: 2024-10-10

## 2025-05-25 ENCOUNTER — APPOINTMENT (OUTPATIENT)
Dept: GENERAL RADIOLOGY | Age: 70
End: 2025-05-25
Payer: MEDICARE

## 2025-05-25 ENCOUNTER — HOSPITAL ENCOUNTER (OUTPATIENT)
Age: 70
Setting detail: OBSERVATION
Discharge: HOME | End: 2025-05-26
Admitting: INTERNAL MEDICINE
Payer: MEDICARE

## 2025-05-25 ENCOUNTER — APPOINTMENT (OUTPATIENT)
Dept: CT IMAGING | Age: 70
End: 2025-05-25
Payer: MEDICARE

## 2025-05-25 DIAGNOSIS — K59.00 CONSTIPATION, UNSPECIFIED CONSTIPATION TYPE: Primary | ICD-10-CM

## 2025-05-25 LAB
ALBUMIN SERPL-MCNC: 4 G/DL (ref 3.4–5)
ALP SERPL-CCNC: 110 U/L (ref 40–129)
ALT SERPL-CCNC: 9 U/L (ref 10–40)
ANION GAP SERPL CALCULATED.3IONS-SCNC: 10 MMOL/L (ref 3–16)
AST SERPL-CCNC: 22 U/L (ref 15–37)
BASOPHILS # BLD: 0 K/UL (ref 0–0.2)
BASOPHILS NFR BLD: 0.4 %
BILIRUB DIRECT SERPL-MCNC: 0.3 MG/DL (ref 0–0.3)
BILIRUB INDIRECT SERPL-MCNC: 0.6 MG/DL (ref 0–1)
BILIRUB SERPL-MCNC: 0.9 MG/DL (ref 0–1)
BUN SERPL-MCNC: 40 MG/DL (ref 7–20)
CALCIUM SERPL-MCNC: 8.8 MG/DL (ref 8.3–10.6)
CHLORIDE SERPL-SCNC: 96 MMOL/L (ref 99–110)
CO2 SERPL-SCNC: 36 MMOL/L (ref 21–32)
CREAT SERPL-MCNC: 1.3 MG/DL (ref 0.8–1.3)
DEPRECATED RDW RBC AUTO: 16.9 % (ref 12.4–15.4)
EOSINOPHIL # BLD: 0.2 K/UL (ref 0–0.6)
EOSINOPHIL NFR BLD: 2.5 %
GFR SERPLBLD CREATININE-BSD FMLA CKD-EPI: 59 ML/MIN/{1.73_M2}
GLUCOSE SERPL-MCNC: 94 MG/DL (ref 70–99)
HCT VFR BLD AUTO: 37.5 % (ref 40.5–52.5)
HGB BLD-MCNC: 12.2 G/DL (ref 13.5–17.5)
LIPASE SERPL-CCNC: 33 U/L (ref 13–60)
LYMPHOCYTES # BLD: 0.8 K/UL (ref 1–5.1)
LYMPHOCYTES NFR BLD: 10.3 %
MCH RBC QN AUTO: 27.2 PG (ref 26–34)
MCHC RBC AUTO-ENTMCNC: 32.6 G/DL (ref 31–36)
MCV RBC AUTO: 83.5 FL (ref 80–100)
MONOCYTES # BLD: 0.5 K/UL (ref 0–1.3)
MONOCYTES NFR BLD: 6.6 %
NEUTROPHILS # BLD: 6.4 K/UL (ref 1.7–7.7)
NEUTROPHILS NFR BLD: 80.2 %
NT-PROBNP SERPL-MCNC: 255 PG/ML (ref 0–124)
PLATELET # BLD AUTO: 188 K/UL (ref 135–450)
PMV BLD AUTO: 7.4 FL (ref 5–10.5)
POTASSIUM SERPL-SCNC: 4.2 MMOL/L (ref 3.5–5.1)
PROT SERPL-MCNC: 6.9 G/DL (ref 6.4–8.2)
RBC # BLD AUTO: 4.5 M/UL (ref 4.2–5.9)
SODIUM SERPL-SCNC: 142 MMOL/L (ref 136–145)
TROPONIN, HIGH SENSITIVITY: 21 NG/L (ref 0–22)
TROPONIN, HIGH SENSITIVITY: 25 NG/L (ref 0–22)
WBC # BLD AUTO: 8 K/UL (ref 4–11)

## 2025-05-25 PROCEDURE — G0378 HOSPITAL OBSERVATION PER HR: HCPCS | Performed by: INTERNAL MEDICINE

## 2025-05-25 PROCEDURE — 2580000003 HC RX 258: Performed by: INTERNAL MEDICINE

## 2025-05-25 PROCEDURE — 2500000003 HC RX 250 WO HCPCS: Performed by: INTERNAL MEDICINE

## 2025-05-25 PROCEDURE — G0378 HOSPITAL OBSERVATION PER HR: HCPCS

## 2025-05-25 PROCEDURE — 96372 THER/PROPH/DIAG INJ SC/IM: CPT

## 2025-05-25 PROCEDURE — 93005 ELECTROCARDIOGRAM TRACING: CPT

## 2025-05-25 PROCEDURE — 83690 ASSAY OF LIPASE: CPT

## 2025-05-25 PROCEDURE — 80048 BASIC METABOLIC PNL TOTAL CA: CPT

## 2025-05-25 PROCEDURE — 6370000000 HC RX 637 (ALT 250 FOR IP)

## 2025-05-25 PROCEDURE — 99285 EMERGENCY DEPT VISIT HI MDM: CPT

## 2025-05-25 PROCEDURE — 6370000000 HC RX 637 (ALT 250 FOR IP): Performed by: INTERNAL MEDICINE

## 2025-05-25 PROCEDURE — 85025 COMPLETE CBC W/AUTO DIFF WBC: CPT

## 2025-05-25 PROCEDURE — 80076 HEPATIC FUNCTION PANEL: CPT

## 2025-05-25 PROCEDURE — 94640 AIRWAY INHALATION TREATMENT: CPT

## 2025-05-25 PROCEDURE — 74177 CT ABD & PELVIS W/CONTRAST: CPT

## 2025-05-25 PROCEDURE — 6360000004 HC RX CONTRAST MEDICATION

## 2025-05-25 PROCEDURE — 6360000002 HC RX W HCPCS: Performed by: INTERNAL MEDICINE

## 2025-05-25 PROCEDURE — 71045 X-RAY EXAM CHEST 1 VIEW: CPT

## 2025-05-25 PROCEDURE — 84484 ASSAY OF TROPONIN QUANT: CPT

## 2025-05-25 PROCEDURE — 83880 ASSAY OF NATRIURETIC PEPTIDE: CPT

## 2025-05-25 RX ORDER — METOPROLOL SUCCINATE 50 MG/1
50 TABLET, EXTENDED RELEASE ORAL DAILY
Status: DISCONTINUED | OUTPATIENT
Start: 2025-05-26 | End: 2025-05-26 | Stop reason: HOSPADM

## 2025-05-25 RX ORDER — MAGNESIUM SULFATE IN WATER 40 MG/ML
2000 INJECTION, SOLUTION INTRAVENOUS PRN
Status: DISCONTINUED | OUTPATIENT
Start: 2025-05-25 | End: 2025-05-26 | Stop reason: HOSPADM

## 2025-05-25 RX ORDER — BISACODYL 5 MG/1
5 TABLET, DELAYED RELEASE ORAL DAILY PRN
Status: DISCONTINUED | OUTPATIENT
Start: 2025-05-25 | End: 2025-05-26 | Stop reason: HOSPADM

## 2025-05-25 RX ORDER — IOPAMIDOL 755 MG/ML
75 INJECTION, SOLUTION INTRAVASCULAR
Status: COMPLETED | OUTPATIENT
Start: 2025-05-25 | End: 2025-05-25

## 2025-05-25 RX ORDER — ONDANSETRON 4 MG/1
4 TABLET, ORALLY DISINTEGRATING ORAL EVERY 8 HOURS PRN
Status: DISCONTINUED | OUTPATIENT
Start: 2025-05-25 | End: 2025-05-26 | Stop reason: HOSPADM

## 2025-05-25 RX ORDER — ALBUTEROL SULFATE 0.83 MG/ML
2.5 SOLUTION RESPIRATORY (INHALATION) EVERY 6 HOURS PRN
Status: DISCONTINUED | OUTPATIENT
Start: 2025-05-25 | End: 2025-05-26 | Stop reason: HOSPADM

## 2025-05-25 RX ORDER — ACETAMINOPHEN 650 MG/1
650 SUPPOSITORY RECTAL EVERY 6 HOURS PRN
Status: DISCONTINUED | OUTPATIENT
Start: 2025-05-25 | End: 2025-05-26 | Stop reason: HOSPADM

## 2025-05-25 RX ORDER — ACETAMINOPHEN 325 MG/1
650 TABLET ORAL EVERY 6 HOURS PRN
Status: DISCONTINUED | OUTPATIENT
Start: 2025-05-25 | End: 2025-05-26 | Stop reason: HOSPADM

## 2025-05-25 RX ORDER — SODIUM CHLORIDE 9 MG/ML
INJECTION, SOLUTION INTRAVENOUS CONTINUOUS
Status: ACTIVE | OUTPATIENT
Start: 2025-05-25 | End: 2025-05-26

## 2025-05-25 RX ORDER — SODIUM CHLORIDE 0.9 % (FLUSH) 0.9 %
5-40 SYRINGE (ML) INJECTION EVERY 12 HOURS SCHEDULED
Status: DISCONTINUED | OUTPATIENT
Start: 2025-05-25 | End: 2025-05-26 | Stop reason: HOSPADM

## 2025-05-25 RX ORDER — DOCUSATE SODIUM 100 MG/1
100 CAPSULE, LIQUID FILLED ORAL 2 TIMES DAILY
Status: DISCONTINUED | OUTPATIENT
Start: 2025-05-25 | End: 2025-05-26 | Stop reason: HOSPADM

## 2025-05-25 RX ORDER — POTASSIUM CHLORIDE 1500 MG/1
40 TABLET, EXTENDED RELEASE ORAL PRN
Status: DISCONTINUED | OUTPATIENT
Start: 2025-05-25 | End: 2025-05-26 | Stop reason: HOSPADM

## 2025-05-25 RX ORDER — TORSEMIDE 20 MG/1
2 TABLET ORAL DAILY
COMMUNITY

## 2025-05-25 RX ORDER — DOCUSATE SODIUM 100 MG/1
1 CAPSULE, LIQUID FILLED ORAL 2 TIMES DAILY PRN
Status: ON HOLD | COMMUNITY
End: 2025-05-26 | Stop reason: HOSPADM

## 2025-05-25 RX ORDER — SODIUM PHOSPHATE, DIBASIC AND SODIUM PHOSPHATE, MONOBASIC 7; 19 G/230ML; G/230ML
1 ENEMA RECTAL DAILY PRN
Status: DISCONTINUED | OUTPATIENT
Start: 2025-05-25 | End: 2025-05-26 | Stop reason: HOSPADM

## 2025-05-25 RX ORDER — POLYETHYLENE GLYCOL 3350 17 G/17G
17 POWDER, FOR SOLUTION ORAL DAILY
Status: DISCONTINUED | OUTPATIENT
Start: 2025-05-25 | End: 2025-05-26 | Stop reason: HOSPADM

## 2025-05-25 RX ORDER — ATORVASTATIN CALCIUM 40 MG/1
40 TABLET, FILM COATED ORAL NIGHTLY
Status: DISCONTINUED | OUTPATIENT
Start: 2025-05-25 | End: 2025-05-26 | Stop reason: HOSPADM

## 2025-05-25 RX ORDER — ONDANSETRON 2 MG/ML
4 INJECTION INTRAMUSCULAR; INTRAVENOUS EVERY 6 HOURS PRN
Status: DISCONTINUED | OUTPATIENT
Start: 2025-05-25 | End: 2025-05-26 | Stop reason: HOSPADM

## 2025-05-25 RX ORDER — LISINOPRIL 20 MG/1
1 TABLET ORAL DAILY
COMMUNITY

## 2025-05-25 RX ORDER — IPRATROPIUM BROMIDE AND ALBUTEROL SULFATE 2.5; .5 MG/3ML; MG/3ML
1 SOLUTION RESPIRATORY (INHALATION) ONCE
Status: COMPLETED | OUTPATIENT
Start: 2025-05-25 | End: 2025-05-25

## 2025-05-25 RX ORDER — FUROSEMIDE 40 MG/1
40 TABLET ORAL DAILY
Status: DISCONTINUED | OUTPATIENT
Start: 2025-05-26 | End: 2025-05-26 | Stop reason: HOSPADM

## 2025-05-25 RX ORDER — SODIUM CHLORIDE 0.9 % (FLUSH) 0.9 %
5-40 SYRINGE (ML) INJECTION PRN
Status: DISCONTINUED | OUTPATIENT
Start: 2025-05-25 | End: 2025-05-26 | Stop reason: HOSPADM

## 2025-05-25 RX ORDER — POTASSIUM CHLORIDE 7.45 MG/ML
10 INJECTION INTRAVENOUS PRN
Status: DISCONTINUED | OUTPATIENT
Start: 2025-05-25 | End: 2025-05-26 | Stop reason: HOSPADM

## 2025-05-25 RX ORDER — SODIUM CHLORIDE 9 MG/ML
INJECTION, SOLUTION INTRAVENOUS PRN
Status: DISCONTINUED | OUTPATIENT
Start: 2025-05-25 | End: 2025-05-26 | Stop reason: HOSPADM

## 2025-05-25 RX ORDER — ENOXAPARIN SODIUM 100 MG/ML
60 INJECTION SUBCUTANEOUS 2 TIMES DAILY
Status: DISCONTINUED | OUTPATIENT
Start: 2025-05-25 | End: 2025-05-26 | Stop reason: HOSPADM

## 2025-05-25 RX ADMIN — MAJOR MAGNESIUM CITRATE ORAL SOLUTION - LEMON 296 ML: 1.75 LIQUID ORAL at 21:48

## 2025-05-25 RX ADMIN — ENOXAPARIN SODIUM 60 MG: 100 INJECTION SUBCUTANEOUS at 21:48

## 2025-05-25 RX ADMIN — SODIUM CHLORIDE, PRESERVATIVE FREE 10 ML: 5 INJECTION INTRAVENOUS at 21:03

## 2025-05-25 RX ADMIN — IOPAMIDOL 75 ML: 755 INJECTION, SOLUTION INTRAVENOUS at 15:00

## 2025-05-25 RX ADMIN — IPRATROPIUM BROMIDE AND ALBUTEROL SULFATE 1 DOSE: 2.5; .5 SOLUTION RESPIRATORY (INHALATION) at 13:05

## 2025-05-25 RX ADMIN — ATORVASTATIN CALCIUM 40 MG: 40 TABLET, FILM COATED ORAL at 21:03

## 2025-05-25 RX ADMIN — ARFORMOTEROL TARTRATE: 15 SOLUTION RESPIRATORY (INHALATION) at 20:35

## 2025-05-25 RX ADMIN — SODIUM CHLORIDE: 0.9 INJECTION, SOLUTION INTRAVENOUS at 21:12

## 2025-05-25 RX ADMIN — DOCUSATE SODIUM 100 MG: 100 CAPSULE, LIQUID FILLED ORAL at 21:48

## 2025-05-25 NOTE — ED NOTES
Patient Name: Ernesto Parnell  : 1955 69 y.o.  MRN: [id number]  ED Room #: A06/A06-06     Chief complaint:   Chief Complaint   Patient presents with    Abdominal Pain     Pt arrived from home w/ abd pain, hasn't had a BM in 4 days. Have taken otc w/o relief. Denies N&V.     Hospital Problem/Diagnosis:   Hospital Problems           Last Modified POA    * (Principal) Constipation 2025 Yes         O2 Flow Rate:O2 Device: None (Room air)   (if applicable)  Cardiac Rhythm:   (if applicable)  Active LDA's:   Peripheral IV 25 Right Antecubital (Active)   Site Assessment Clean, dry & intact 25 133   Line Status Normal saline locked;Brisk blood return;Specimen collected 25 1337   Dressing Status New dressing applied 25 1337            How does patient ambulate? Wheelchair    2. How does patient take pills? Unknown, no oral medications were given in the Emergency Department    3. Is patient alert? Alert    4. Is patient oriented? To Person, To Place, To Time, and To Situation    5.   Patient arrived from:  home  Facility Name: ___________________________________________    6. If patient is disoriented or from a Skill Nursing Facility has family been notified of admission? No    7. Patient belongings? Belongings: Clothing    Disposition of belongings? Kept with Patient     8. Any specific patient or family belongings/needs/dynamics?   a. On purewick due to incontinence.    9. Miscellaneous comments/pending orders?  a. Admission.      If there are any additional questions please reach out to the Emergency Department.       Refugio Banegas RN  [phone]

## 2025-05-25 NOTE — PROGRESS NOTES
Clinical Pharmacy Consult Note  Medication History     Admit Date: 5/25/2025    List of current medications patient is taking is complete. Home Medication list in Epic updated to reflect changes noted below.    Source of information: I spoke to the patient and viewed his dispense report.     Patient's home pharmacy:   [id number] - [place], [place] - [address] [place] [address] [place] - P [phone] - F [phone]  [address] [place] [id number]  Phone: [phone] Fax: [phone]      Changes made to medication list:   Medications removed: (include reason, ex: therapy completed, patient no longer taking, etc.)  Albuterol- Not taking  Hibiclens- Not taking  Furosemide- on Torsemide  Metoprolol succinate- Not taking  Medications added:   Jardiance  Lisinopril  Torsemide  Medication doses adjusted:   None  Other notes:   The patient reported taking Torsemide 40 mg once daily instead of the prescribed 20 mg once daily.     Current Outpatient Medications   Medication Instructions    atorvastatin (LIPITOR) 40 mg, Oral, NIGHTLY    docusate sodium (COLACE) 100 MG capsule 1 capsule, Oral, 2 TIMES DAILY PRN    empagliflozin (JARDIANCE) 10 MG tablet 1 tablet, DAILY    fluticasone-salmeterol (ADVAIR) 500-50 MCG/ACT AEPB diskus inhaler 1 puff, Inhalation, 2 TIMES DAILY    Ibuprofen-Acetaminophen 125-250 MG TABS 2 caplets, EVERY 8 HOURS PRN    lisinopril (PRINIVIL;ZESTRIL) 20 MG tablet 1 tablet, DAILY    torsemide (DEMADEX) 20 MG tablet 2 tablets, Oral, DAILY       Thank you for consulting pharmacy,    Sabino Gaming, Pharmacy Intern

## 2025-05-25 NOTE — ED PROVIDER NOTES
ED Attending Attestation Note     Date of evaluation: 5/25/2025    This patient was seen by the resident.  I have seen and examined the patient, agree with the workup, evaluation, management and diagnosis. The care plan has been discussed.  I have reviewed the ECG and concur with the resident's interpretation.  My assessment reveals a 69-year-old male with multiple medical comorbidities including coronary artery disease, pulmonary embolism, GI bleed, iron deficiency anemia, multiple abdominal surgeries and history of ventral hernia.  In the past he was deemed not a good operative candidate for the ventral hernia repair due to his medical comorbidities.  His wife is bedside and helps aid in presentation.  Per the patient he has not had a bowel movement for days.  This is not typical for him.  He is having no nausea or vomiting.  They are concerned for bowel obstruction.  They are also concerned as the hernia has been changing colors over the last few months.  He is also having worsening shortness of breath with exertion.  He uses a walker to ambulate at baseline.  The patient is not having any chest pain.  He is not currently short of breath sitting in the bed.  He does have a pulmonary embolism in the past but he states he is not having any the symptoms now he does note that he takes diuretics he has been admitted for CHF exacerbation in the past and he has had increased swelling in his bilateral lower extremities    On my exam the patient is sitting in bed comfortably.  He does have a.  Large hernia present, it does not appear dusky or incarcerated on exam.  Is soft and nontender.  Heart rate regular rate rhythm.  There are some expiratory wheezing.  The patient said he missed his morning inhaler treatment.  The patient also has bilateral lower extremity pitting edema.    Will plan to evaluate for any signs of obstructive process in the abdomen, and also evaluate his heart for concerns of CHF exacerbation.  His

## 2025-05-25 NOTE — ED PROVIDER NOTES
[facility]  EMERGENCY DEPARTMENT ENCOUNTER          EM RESIDENT NOTE       Date of evaluation: 5/25/2025    Chief Complaint     Abdominal Pain (Pt arrived from home w/ abd pain, hasn't had a BM in 4 days. Have taken otc w/o relief. Denies N&V.)      History of Present Illness     Ernesto Parnell is a 69 y.o. male with history of CAD s/p PCI with JERALD to LCx & OM1 (12/28/22), Carotid Stenosis, HLD, HTN, PVD, h/o PE in (2009) on Xarelto, s/p gastric bypass (7/2014), anemia, LEESA (on CPAP), COPD, h/o GI bleed, ventral hernia, who presents emerged department for difficulty with stooling.  Per patient, he has not stooled for the past 4 days, they have tried to ducolax, MiraLAX at home.  Patient denies any fevers.  Patient denies vomiting.  Patient reports minimal abdominal pain.  Patient states that his hernia has been increasing in size for the past several months, he does have a follow-up appointment later this week.  He notes the stretch marks have been worsening, and his wife thinks it is looked slightly brighter, but this is over the course of several months.  Patient states he has been having trouble with his BiPAP machine at home however he has an appointment with pulmonology later this week. Patient's wife states that over the last couple of months he has had increased SOB when walking.  Patient does not take his COPD medications this morning.    MEDICAL DECISION MAKING / ASSESSMENT / PLAN     INITIAL VITALS: BP: (!) 147/75, Temp: 98.1 °F (36.7 °C), Pulse: 84, Respirations: 22, SpO2: (!) 87 %    Ernesto Parnell is a 69 y.o. male who presents to the emergency department for concerns regarding difficulty with stool.  Patient has not stooled in 4 days.  LFTs normal, lipase normal.  EKG nonischemic.  Initial troponin 25, repeat 21.  .  Low suspicion for ACS.  BMP with no gross electrolyte abnormalities.  CBC with no leukocytosis, no anemia.  Chest x-ray with no concern regarding pneumothorax,

## 2025-05-25 NOTE — ED NOTES
Pt continues resting in bed at this time.  Pt denies needs at this time.  Pt informed of inpatient room assignment.  Pt and family verbalize understanding and deny questions or needs at this time.     Refugio Banegas RN  05/25/25 [phone]

## 2025-05-25 NOTE — PROGRESS NOTES
4 Eyes Admission Assessment     I agree as the admission nurse that 2 RN's have performed a thorough Head to Toe Skin Assessment on the patient. ALL assessment sites listed below have been assessed on admission.       Areas assessed by both nurses:   [x]   Head, Face, and Ears   [x]   Shoulders, Back, and Chest  [x]   Arms, Elbows, and Hands   [x]   Coccyx, Sacrum, and Ischium  [x]   Legs, Feet, and Heels        Patient has redness and excoriation on abdominal folds, blanchable redness noted on coccyx  Does the Patient have Skin Breakdown?  Yes a wound was noted on the Admission Assessment and an LDA was Initiated documentation include the Ashely-wound, Wound Assessment, Measurements, Dressing Treatment, Drainage, and Color\",         Navin Prevention initiated:  Yes   Wound Care Orders initiated:  Yes      [facility] nurse consulted for Pressure Injury (Stage 3,4, Unstageable, DTI, NWPT, and Complex wounds) or Navin score 18 or lower:  Yes, navin scale 17      Nurse 1 eSignature: Electronically signed by Gissell Wagner RN on 5/25/25 at 6:42 PM EDT    **SHARE this note so that the co-signing nurse is able to place an eSignature**    Nurse 2 eSignature: Electronically signed by Yanick Alexandre RN on 5/25/25 at 6:50 PM EDT

## 2025-05-25 NOTE — PROGRESS NOTES
Patient Ernesto Parnell to room 5303 from ED. Patient is A&O x 4. VSS. Patient oriented to the room all safety measures in place. Patient given IS and SCDs at this time. Admission orders released and patient 4 eyes completed. Admission documentation completed. No other needs are noted at this time.    [x] Bed alarm on and cord plugged into wall  [x] Bed in lowest position  [x] Call light and bedside table within reach  [x] Patient educated on all safety measures  []Oxygen connected to wall (if applicable)     Nurse 1 Esignature: Electronically signed by Gissell Wagner, RN on 5/25/25 at 6:44 PM EDT  Nurse 2 Esignature: Electronically signed by Yanick Alexandre RN on 5/25/25 at 6:50 PM EDT

## 2025-05-25 NOTE — H&P
V2.0  History and Physical      Name:  Ernesto Parnell /Age/Sex: 1955  (69 y.o. male)   MRN & CSN:  [id number] & [id number] Encounter Date/Time: 2025 4:41 PM EDT   [facility] PCP: Sandro Collins MD       Hospital Day: 1    Assessment and Plan:   Ernesto Parnell is a 69 y.o. male with a pmh of essential hypertension, pulmonary embolism, CAD, adenomatous polyp of sigmoid colon, status post laparoscopic sigmoidectomy, morbid obesity with LEESA and many other medical conditions as mentioned in PMH who presents with Constipation for few days    Hospital Problems           Last Modified POA    * (Principal) Constipation 2025 Yes   #Constipation  - CT abdomen and pelvis with IV contrast with no stool burden, no evidence of bowel obstruction  - Ineffective over-the-counter stool softeners and laxatives  - Increase Colace to twice daily, Fleet enema daily as needed, scheduled MiraLAX daily and Dulcolax 5 mg daily as needed, magnesium citrate x 1  - If no effect from above measures will consider trial of GoLytely  -Ambulate patient to shift  - Avoid opioids for pain relief  - Patient has history of robotic sigmoidectomy for large sigmoid polyp (malignant) with end to end colorectal stapled anastomosis in 2023  - Will get general surgery consult    #Morbid obesity with BMI of 66 and LEESA  - Patient has been having problems with his BiPAP machine at home and has an appointment with his pulmonologist next week    #Family unable to care for the patient at home, but patient denies that he has to go to rehab  - PT/OT  - Case management and social service consults, to be discussed with the patient's family    #Chronic medical conditions as mentioned in PMH  - Will continue home medications appropriately  - Continue home inhalers    Disposition:   Current Living situation: Home  Expected Disposition: SNF  Estimated D/C: 2-3 days    Diet ADULT DIET; Regular; Low Sodium (2 gm)   DVT

## 2025-05-26 VITALS
WEIGHT: 315 LBS | OXYGEN SATURATION: 98 % | TEMPERATURE: 97.5 F | SYSTOLIC BLOOD PRESSURE: 116 MMHG | DIASTOLIC BLOOD PRESSURE: 58 MMHG | HEART RATE: 69 BPM | BODY MASS INDEX: 46.65 KG/M2 | HEIGHT: 69 IN | RESPIRATION RATE: 18 BRPM

## 2025-05-26 PROBLEM — K59.00 CONSTIPATION: Status: RESOLVED | Noted: 2025-05-25 | Resolved: 2025-05-26

## 2025-05-26 LAB
ANION GAP SERPL CALCULATED.3IONS-SCNC: 8 MMOL/L (ref 3–16)
BUN SERPL-MCNC: 35 MG/DL (ref 7–20)
CALCIUM SERPL-MCNC: 8.1 MG/DL (ref 8.3–10.6)
CHLORIDE SERPL-SCNC: 98 MMOL/L (ref 99–110)
CO2 SERPL-SCNC: 36 MMOL/L (ref 21–32)
CREAT SERPL-MCNC: 1.2 MG/DL (ref 0.8–1.3)
EKG ATRIAL RATE: 81 BPM
EKG DIAGNOSIS: NORMAL
EKG P AXIS: 59 DEGREES
EKG P-R INTERVAL: 172 MS
EKG Q-T INTERVAL: 380 MS
EKG QRS DURATION: 94 MS
EKG QTC CALCULATION (BAZETT): 441 MS
EKG R AXIS: 42 DEGREES
EKG T AXIS: 51 DEGREES
EKG VENTRICULAR RATE: 81 BPM
GFR SERPLBLD CREATININE-BSD FMLA CKD-EPI: 64 ML/MIN/{1.73_M2}
GLUCOSE SERPL-MCNC: 104 MG/DL (ref 70–99)
POTASSIUM SERPL-SCNC: 3.8 MMOL/L (ref 3.5–5.1)
SODIUM SERPL-SCNC: 142 MMOL/L (ref 136–145)

## 2025-05-26 PROCEDURE — 6360000002 HC RX W HCPCS: Performed by: INTERNAL MEDICINE

## 2025-05-26 PROCEDURE — 96372 THER/PROPH/DIAG INJ SC/IM: CPT

## 2025-05-26 PROCEDURE — 97116 GAIT TRAINING THERAPY: CPT

## 2025-05-26 PROCEDURE — 94640 AIRWAY INHALATION TREATMENT: CPT

## 2025-05-26 PROCEDURE — 36415 COLL VENOUS BLD VENIPUNCTURE: CPT

## 2025-05-26 PROCEDURE — G0378 HOSPITAL OBSERVATION PER HR: HCPCS

## 2025-05-26 PROCEDURE — 2700000000 HC OXYGEN THERAPY PER DAY

## 2025-05-26 PROCEDURE — 97162 PT EVAL MOD COMPLEX 30 MIN: CPT

## 2025-05-26 PROCEDURE — 94761 N-INVAS EAR/PLS OXIMETRY MLT: CPT

## 2025-05-26 PROCEDURE — 80048 BASIC METABOLIC PNL TOTAL CA: CPT

## 2025-05-26 PROCEDURE — 6370000000 HC RX 637 (ALT 250 FOR IP): Performed by: INTERNAL MEDICINE

## 2025-05-26 PROCEDURE — 2500000003 HC RX 250 WO HCPCS: Performed by: INTERNAL MEDICINE

## 2025-05-26 RX ORDER — POLYETHYLENE GLYCOL 3350 17 G/17G
17 POWDER, FOR SOLUTION ORAL 2 TIMES DAILY PRN
Qty: 1530 G | Refills: 1 | Status: SHIPPED | OUTPATIENT
Start: 2025-05-26 | End: 2025-08-24

## 2025-05-26 RX ORDER — METOPROLOL SUCCINATE 50 MG/1
50 TABLET, EXTENDED RELEASE ORAL DAILY
Qty: 30 TABLET | Refills: 3 | Status: SHIPPED
Start: 2025-05-27

## 2025-05-26 RX ORDER — AMOXICILLIN 250 MG
1 CAPSULE ORAL 2 TIMES DAILY PRN
Qty: 90 TABLET | Refills: 1 | Status: SHIPPED | OUTPATIENT
Start: 2025-05-26

## 2025-05-26 RX ADMIN — POLYETHYLENE GLYCOL 3350 17 G: 17 POWDER, FOR SOLUTION ORAL at 08:35

## 2025-05-26 RX ADMIN — ENOXAPARIN SODIUM 60 MG: 100 INJECTION SUBCUTANEOUS at 08:35

## 2025-05-26 RX ADMIN — SODIUM CHLORIDE, PRESERVATIVE FREE 10 ML: 5 INJECTION INTRAVENOUS at 08:36

## 2025-05-26 RX ADMIN — ARFORMOTEROL TARTRATE: 15 SOLUTION RESPIRATORY (INHALATION) at 08:48

## 2025-05-26 RX ADMIN — FUROSEMIDE 40 MG: 40 TABLET ORAL at 08:35

## 2025-05-26 RX ADMIN — ACETAMINOPHEN 650 MG: 325 TABLET ORAL at 06:41

## 2025-05-26 RX ADMIN — DOCUSATE SODIUM 100 MG: 100 CAPSULE, LIQUID FILLED ORAL at 08:35

## 2025-05-26 NOTE — DISCHARGE SUMMARY
Discharge Summary    Name:  Ernesto Parnell /Age/Sex: 1955  (69 y.o. male)   MRN & CSN:  [id number] & [id number] Admission Date/Time: 2025 12:38 PM   Attending:  No att. providers found Discharging Physician: Jose J Farr MD     Discharge diagnosis and plan:  HPI: \"Ernesto Parnell is a 69 y.o. male with pmh as mentioned above presents with complaints of abdominal pain.  States that he has not had a bowel movement in 4 days.  Denies nausea and vomiting.  Patient has taken over-the-counter medications without any relief. Patient has history of bowel surgery in 2023 for large malignant sigmoid polyp. Abdominal pain is minimal.  Has a large hernia which has been increasing in size for the past several months.  He did as an outpatient surgery follow-up next week. \"    # Constipation  CT abdomen and pelvis with IV contrast with no stool burden, no evidence of bowel obstruction. Ineffective over-the-counter stool softeners and laxatives. Increased Colace to twice daily, Fleet enema daily as needed, scheduled MiraLAX daily and Dulcolax 5 mg daily as needed, magnesium citrate x 1. If no effect from above measures plan was to consider trial of GoLytely but patient moved bowel. General surgery team onboard. Discharge on peg and senna.     #Morbid obesity with BMI of 66 and LEESA  - Patient has been having problems with his BiPAP machine at home and has an appointment with his pulmonologist next week     #Family unable to care for the patient at home, but patient denies that he has to go to rehab  - PT/OT  - Case management and social service consults, to be discussed with the patient's family     #Chronic medical conditions as mentioned in PMH  - Will continue home medications appropriately  - Continue home inhalers      Discharge Exam  BP (!) 116/58   Pulse 69   Temp 97.5 °F (36.4 °C) (Oral)   Resp 18   Ht 1.753 m (5' 9\")   Wt (!) 201.2 kg (443 lb 9.6 oz)   SpO2 98%   BMI 65.51 kg/m²

## 2025-05-26 NOTE — CARE COORDINATION
1:28 PM  Therapy recs home w HHPT. Orders placed referral sent to [facility]. (Accepted)    Electronically signed by Santos Denton RN, CM on 5/26/2025 at 1:28 PM.  Phone: [phone]  Fax: [phone]      12:09 PM  DC order noted; consults for SNF placement.   Therapy ordered; will need therapy recs to see if patient is appropriate for SNF.   Patient will need to be flipped to inpatient status and have a qualifying 3mn stay in order to qualify for SNF  Call placed to RN; she will speak with patient and contact therapy to see.     Electronically signed by Santos Denton RN, CM on 5/26/2025 at 12:12 PM.  Phone: [phone]  Fax: [phone]

## 2025-05-26 NOTE — PROGRESS NOTES
AVS reviewed with patient. All questions answered. PIV x1 removed. Patient to be taken down in wheelchair with RN. Electronically signed by Gissell Wagner RN on 5/26/2025 at 2:01 PM

## 2025-05-26 NOTE — PROGRESS NOTES
Physical Therapy  Facility/Department: [address]  Physical Therapy Initial Assessment    Name: Ernesto Parnell  : 1955  MRN: [id number]  Date of Service: 2025    Discharge Recommendations:  Home with assist PRN, Home with Home health PT   PT Equipment Recommendations  Equipment Needed: No  Other: owns rollator      Patient Diagnosis(es): The encounter diagnosis was Constipation, unspecified constipation type.  Past Medical History:  has a past medical history of AAA (abdominal aortic aneurysm), Aortic root dilatation, Aortic valve stenosis, Arthritis, CAD (coronary artery disease), Depressive disorder, Encounter for current long-term use of anticoagulants, YUAN (generalized anxiety disorder), H/O gastric bypass, History of blood transfusion, Hypercapnia, Hyperlipidemia, Hypertension, Obesity, Panic attacks, PE (pulmonary embolism), PVD (peripheral vascular disease), Sleep apnea, and Unsteady gait.  Past Surgical History:  has a past surgical history that includes Cholecystectomy (); Gastric bypass surgery (); hernia repair (2017); Coronary stent placement (2023); Colonoscopy (N/A, 2023); Colonoscopy (N/A, 2023); Colonoscopy (2023); other surgical history (07/15/2014); Nasal septum surgery; and Small intestine surgery (N/A, 2023).    Assessment  Body Structures, Functions, Activity Limitations Requiring Skilled Therapeutic Intervention: Decreased functional mobility ;Decreased strength;Decreased endurance;Decreased balance  Assessment: Patient demonstrates impaired functional mobility, presently slightly below his functional baseline, now requiring (S) with RW for ambulation up to 60ft.  Patient is typically modified (I) with rollator at home.  Patient limited this date by generalized deconditioning, weakness, and low endurance.  Patient planning to return to home with wife - PT recommends home PT to facilitate safe transition to home and to

## 2025-05-26 NOTE — CONSULTS
General Surgery   Resident Consult Note    Reason for Consult: Constipation, massive ventral hernia     History of Present Illness:   Ernesto Parnell is a 69 y.o. male with Hx delineated below, notably baseline oxygen 2 L, debility, laparoscopic tomasa en y gastric bypass with Dr. Marcum in 2014, robotic sigmoidectomy in 2023 By Dr. Mcclain complicated by ventral hernia at the extraction site who presents with 4 days of constipation prompting presentation to the ED.     Patient has seen Dr. Boswell in December 2024 for possible ventral hernia repair. At this time he was instructed to lose weight prior to hernia repair. He was at a BMI of 63 at this time.  He is now at a BMI of 65.  He is scheduled to see Dr. Boswell next week.  However over the last week he has had constipation.  He normally goes every day.  Considering this constipation and lack of bowel movement he was concerned that he may have had an obstruction at his hernia and presented to the ED for evaluation.  He denies nausea or vomiting.  He is still passing gas.    Patient was admitted from the ED due to additional family concern for inability to properly take care of patient.  Social work has been engaged for possible placement.    Patient was started on bowel regimen after admission and had a bowel movement last night.    Past Medical History:        Diagnosis Date    AAA (abdominal aortic aneurysm)     4.5 cm    Aortic root dilatation     Aortic valve stenosis     Arthritis     knees    CAD (coronary artery disease)     Depressive disorder     Encounter for current long-term use of anticoagulants     YUAN (generalized anxiety disorder)     H/O gastric bypass 07/2014    History of blood transfusion     Hypercapnia     Hyperlipidemia     Hypertension     Obesity     Panic attacks     PE (pulmonary embolism) 2010    PVD (peripheral vascular disease)     Sleep apnea     CPAP currently broken    Unsteady gait     uses walker       Past Surgical History:

## 2025-05-28 NOTE — PROGRESS NOTES
[facility]   General & Laparoscopic Surgery  [employer]       HPI:    Ernesto Parnell is very pleasant 69 y.o. male who is following up in regards to incisional hernia, and weight check.    Patient comes to the clinic and reports he is doing well, but feels his hernia is protruding more, some abdominal discomfort, Patient has been trying to work on weight loss though he has gained 4 lbs since his last appointment 12/2024. The bulge is not reducible. He has had some bowel changes with some constipation.   Patient denies nausea, vomiting, fever, chills.    Past Medical History:   Diagnosis Date    AAA (abdominal aortic aneurysm)     4.5 cm    Aortic root dilatation     Aortic valve stenosis     Arthritis     knees    CAD (coronary artery disease)     Depressive disorder     Encounter for current long-term use of anticoagulants     YUAN (generalized anxiety disorder)     H/O gastric bypass 07/2014    History of blood transfusion     Hypercapnia     Hyperlipidemia     Hypertension     Obesity     Panic attacks     PE (pulmonary embolism) 2010    PVD (peripheral vascular disease)     Sleep apnea     CPAP currently broken    Unsteady gait     uses walker     Past Surgical History:   Procedure Laterality Date    CHOLECYSTECTOMY  2009    COLONOSCOPY N/A 11/07/2023    COLONOSCOPY POLYPECTOMY REMOVAL HOT BIOPSY/STOMA performed by Yoel Suarez MD at [facility] ENDOSCOPY    COLONOSCOPY N/A 11/07/2023    COLONOSCOPY CONTROL HEMORRHAGE performed by Yoel Suarez MD at [facility] ENDOSCOPY    COLONOSCOPY  11/07/2023    COLONOSCOPY SUBMUCOSAL INJECTION performed by Yoel Suarez MD at [facility] ENDOSCOPY    CORONARY STENT PLACEMENT  01/2023    GASTRIC BYPASS SURGERY  2014    HERNIA REPAIR  08/01/2017    umbilical hernia    NASAL SEPTUM SURGERY      repair of deviated septum    OTHER SURGICAL HISTORY  07/15/2014    insertion Neo filter    SMALL INTESTINE SURGERY N/A 12/27/2023    ROBOTIC

## 2025-05-29 ENCOUNTER — OFFICE VISIT (OUTPATIENT)
Dept: BARIATRICS/WEIGHT MGMT | Age: 70
End: 2025-05-29
Payer: MEDICARE

## 2025-05-29 VITALS
BODY MASS INDEX: 46.65 KG/M2 | HEART RATE: 99 BPM | HEIGHT: 69 IN | DIASTOLIC BLOOD PRESSURE: 68 MMHG | SYSTOLIC BLOOD PRESSURE: 130 MMHG | WEIGHT: 315 LBS

## 2025-05-29 DIAGNOSIS — E66.01 MORBID OBESITY WITH BMI OF 60.0-69.9, ADULT (HCC): ICD-10-CM

## 2025-05-29 DIAGNOSIS — K43.2 INCISIONAL HERNIA, WITHOUT OBSTRUCTION OR GANGRENE: Primary | ICD-10-CM

## 2025-05-29 PROCEDURE — G8427 DOCREV CUR MEDS BY ELIG CLIN: HCPCS | Performed by: SURGERY

## 2025-05-29 PROCEDURE — 3075F SYST BP GE 130 - 139MM HG: CPT | Performed by: SURGERY

## 2025-05-29 PROCEDURE — 3078F DIAST BP <80 MM HG: CPT | Performed by: SURGERY

## 2025-05-29 PROCEDURE — 3017F COLORECTAL CA SCREEN DOC REV: CPT | Performed by: SURGERY

## 2025-05-29 PROCEDURE — 1159F MED LIST DOCD IN RCRD: CPT | Performed by: SURGERY

## 2025-05-29 PROCEDURE — G8417 CALC BMI ABV UP PARAM F/U: HCPCS | Performed by: SURGERY

## 2025-05-29 PROCEDURE — 99214 OFFICE O/P EST MOD 30 MIN: CPT | Performed by: SURGERY

## 2025-05-29 PROCEDURE — 1123F ACP DISCUSS/DSCN MKR DOCD: CPT | Performed by: SURGERY

## 2025-05-29 PROCEDURE — 1036F TOBACCO NON-USER: CPT | Performed by: SURGERY

## (undated) DEVICE — TROCAR: Brand: KII FIOS FIRST ENTRY

## (undated) DEVICE — SYRINGE, LUER SLIP, STERILE, 60ML: Brand: MEDLINE

## (undated) DEVICE — LIQUIBAND RAPID ADHESIVE 36/CS 0.8ML: Brand: MEDLINE

## (undated) DEVICE — SUTURE VCRL + SZ 3-0 L27IN ABSRB UD L26MM SH 1/2 CIR VCP416H

## (undated) DEVICE — ELECTRODE PT RET AD L9FT HI MOIST COND ADH HYDRGEL CORDED

## (undated) DEVICE — TOWEL,OR,DSP,ST,BLUE,STD,6/PK,12PK/CS: Brand: MEDLINE

## (undated) DEVICE — ENDOSCOPIC KIT 2 12 FT OP4 DE2 GWN SYR

## (undated) DEVICE — SEAL

## (undated) DEVICE — SUTURE ETHLN SZ 3-0 L18IN NONABSORBABLE BLK L24MM PS-1 3/8 1663G

## (undated) DEVICE — SUTURE ABSORBABLE MONOFILAMENT 0 CTX 60 IN VIO PDS + PDP990G

## (undated) DEVICE — SUTURE VCRL + SZ 3-0 L18IN ABSRB UD SH 1/2 CIR TAPERCUT NDL VCP864D

## (undated) DEVICE — SUTURE MCRYL + SZ 4-0 L18IN ABSRB UD L19MM PS-2 3/8 CIR MCP496G

## (undated) DEVICE — Device: Brand: DISPOSABLE ELECTROSURGICAL SNARE

## (undated) DEVICE — GOWN,REINF,POLY,AURORA,XLNG/XXL,STRL: Brand: MEDLINE

## (undated) DEVICE — SCISSORS SURG DIA8MM MPLR CRV ENDOWRIST

## (undated) DEVICE — TISSUE RETRIEVAL SYSTEM: Brand: INZII RETRIEVAL SYSTEM

## (undated) DEVICE — MEDI-VAC NON-CONDUCTIVE SUCTION TUBING: Brand: CARDINAL HEALTH

## (undated) DEVICE — BLADELESS OBTURATOR: Brand: WECK VISTA

## (undated) DEVICE — CANNULA SEAL

## (undated) DEVICE — SOLUTION IRRIG 1000ML STRL H2O USP PLAS POUR BTL

## (undated) DEVICE — CADIERE FORCEPS: Brand: ENDOWRIST

## (undated) DEVICE — PUMP SUC IRR TBNG L10FT W/ HNDPC ASSEMB STRYKEFLOW 2

## (undated) DEVICE — WOUND RETRACTOR AND PROTECTOR: Brand: ALEXIS WOUND PROTECTOR-RETRACTOR

## (undated) DEVICE — FENESTRATED BIPOLAR FORCEPS: Brand: ENDOWRIST

## (undated) DEVICE — REDUCER: Brand: ENDOWRIST

## (undated) DEVICE — AIRLIFE™ NASAL OXYGEN CANNULA CURVED, FLARED TIP, WITH 7 FEET (2.1 M) CRUSH RESISTANT TUBING, OVER-THE-EAR STYLE: Brand: AIRLIFE™

## (undated) DEVICE — GLOVE,SURG,SENSICARE SLT,LF,PF,7.5: Brand: MEDLINE

## (undated) DEVICE — COLUMN DRAPE

## (undated) DEVICE — SUTURE VCRL + SZ 0 L27IN ABSRB VLT L26MM UR-6 5/8 CIR VCP603H

## (undated) DEVICE — Device

## (undated) DEVICE — SNARE ENDOSCP L240CM LOOP W27MM SHTH DIA2.4MM WRK CHN 2.8MM

## (undated) DEVICE — SUTURE PERMA-HAND SZ 2-0 L30IN NONABSORBABLE BLK L26MM SH K833H

## (undated) DEVICE — TIP COVER ACCESSORY

## (undated) DEVICE — SUTURE ABSORBABLE MONOFILAMENT 3-0 SH 27 IN UD PDS + PDP416H

## (undated) DEVICE — STAPLER INT L28CM DIA29MM CLS STPL H10-2.5MM OPN LEG L5.5MM

## (undated) DEVICE — PMI DISPOSABLE PUNCTURE CLOSURE DEVICE / SUTURE GRASPER: Brand: PMI

## (undated) DEVICE — SUTURE STRATAFIX SZ 3-0 L20CM ABSRB VLT NDL SH-1 L22MM 1/2 SXPP1B453

## (undated) DEVICE — ARM DRAPE

## (undated) DEVICE — 3M™ IOBAN™ 2 ANTIMICROBIAL INCISE DRAPE 6650EZ: Brand: IOBAN™ 2

## (undated) DEVICE — VESSEL SEALER EXTEND: Brand: ENDOWRIST

## (undated) DEVICE — NEEDLE INJ 25GA P5MM SHFT L230CM SHTH DIA2.5MM S STL TEF

## (undated) DEVICE — HEMOSPRAY ENDOSCOPIC HEMOSTAT: Brand: HEMOSPRAY

## (undated) DEVICE — Device: Brand: PRIME MEDICAL LLC

## (undated) DEVICE — STAPLER 60: Brand: SUREFORM

## (undated) DEVICE — ELECTRODE ECG MONITR FOAM TEAR DROP ADLT RED

## (undated) DEVICE — Device: Brand: SPOT EX ENDOSCOPIC TATTOO

## (undated) DEVICE — STAPLER 60 RELOAD BLUE: Brand: SUREFORM

## (undated) DEVICE — AIRSEAL BIFURCATED SMOKE EVAC FILTERED TUBE SET: Brand: AIRSEAL

## (undated) DEVICE — TRAP SPEC POLYPR SGL CHMBR FN MESH SCRN